# Patient Record
Sex: MALE | Race: WHITE | NOT HISPANIC OR LATINO | Employment: OTHER | URBAN - METROPOLITAN AREA
[De-identification: names, ages, dates, MRNs, and addresses within clinical notes are randomized per-mention and may not be internally consistent; named-entity substitution may affect disease eponyms.]

---

## 2017-06-06 ENCOUNTER — OFFICE VISIT (OUTPATIENT)
Dept: FAMILY MEDICINE | Facility: CLINIC | Age: 82
End: 2017-06-06
Payer: COMMERCIAL

## 2017-06-06 VITALS — HEIGHT: 69 IN | WEIGHT: 206.6 LBS | RESPIRATION RATE: 16 BRPM | BODY MASS INDEX: 30.6 KG/M2

## 2017-06-06 DIAGNOSIS — I71.40 ABDOMINAL AORTIC ANEURYSM (AAA) WITHOUT RUPTURE (H): Primary | ICD-10-CM

## 2017-06-06 DIAGNOSIS — N52.9 IMPOTENCE: ICD-10-CM

## 2017-06-06 DIAGNOSIS — E78.5 HYPERLIPIDEMIA LDL GOAL <100: ICD-10-CM

## 2017-06-06 LAB
ANION GAP SERPL CALCULATED.3IONS-SCNC: 9 MMOL/L (ref 3–14)
BUN SERPL-MCNC: 17 MG/DL (ref 7–30)
CALCIUM SERPL-MCNC: 9.6 MG/DL (ref 8.5–10.1)
CHLORIDE SERPL-SCNC: 108 MMOL/L (ref 94–109)
CHOLEST SERPL-MCNC: 231 MG/DL
CO2 SERPL-SCNC: 23 MMOL/L (ref 20–32)
CREAT SERPL-MCNC: 1.03 MG/DL (ref 0.66–1.25)
GFR SERPL CREATININE-BSD FRML MDRD: 69 ML/MIN/1.7M2
GLUCOSE SERPL-MCNC: 89 MG/DL (ref 70–99)
HDLC SERPL-MCNC: 46 MG/DL
LDLC SERPL CALC-MCNC: 151 MG/DL
NONHDLC SERPL-MCNC: 185 MG/DL
POTASSIUM SERPL-SCNC: 4.4 MMOL/L (ref 3.4–5.3)
SODIUM SERPL-SCNC: 140 MMOL/L (ref 133–144)
TRIGL SERPL-MCNC: 169 MG/DL

## 2017-06-06 PROCEDURE — 36415 COLL VENOUS BLD VENIPUNCTURE: CPT | Performed by: FAMILY MEDICINE

## 2017-06-06 PROCEDURE — 80061 LIPID PANEL: CPT | Performed by: FAMILY MEDICINE

## 2017-06-06 PROCEDURE — 80048 BASIC METABOLIC PNL TOTAL CA: CPT | Performed by: FAMILY MEDICINE

## 2017-06-06 PROCEDURE — 99214 OFFICE O/P EST MOD 30 MIN: CPT | Performed by: FAMILY MEDICINE

## 2017-06-06 RX ORDER — SILDENAFIL 100 MG/1
100 TABLET, FILM COATED ORAL DAILY PRN
Qty: 25 TABLET | Refills: 3 | Status: SHIPPED | OUTPATIENT
Start: 2017-06-06 | End: 2018-06-05

## 2017-06-06 NOTE — PROGRESS NOTES
SUBJECTIVE:                                                            Dragan Prasad is a 81 year old male who presents for Preventive Visit. Dragan is in the day for the follow up of his multiple problems. He is currently doing quite well he scheduled to have his abdominal aorta rescanned. In addition to that he's concerned about the fact that he has plugged ears bilaterally, he has multiple skin lesions he would like us to look at it some skin tags he likens to remove, and he continues to have significant difficulty with erectile dysfunction.      Are you in the first 12 months of your Medicare Part B coverage?  No    Healthy Habits:    Do you get at least three servings of calcium containing foods daily (dairy, green leafy vegetables, etc.)? yes    Amount of exercise or daily activities, outside of work: 4-5 day(s) per week    Problems taking medications regularly No    Medication side effects: No    Have you had an eye exam in the past two years? yes    Do you see a dentist twice per year? no    Do you have sleep apnea, excessive snoring or daytime drowsiness?no    COGNITIVE SCREEN  1) Repeat 3 items (Banana, Sunrise, Chair)    2) Clock draw: NORMAL  3) 3 item recall: Recalls 3 objects  Results: 3 items recalled: COGNITIVE IMPAIRMENT LESS LIKELY    Mini-CogTM Copyright S Janneth. Licensed by the author for use in [facility]; reprinted with permission (hay@[facility]). All rights reserved.                Reviewed and updated as needed this visit by clinical staff  Tobacco  Allergies  Meds  Med Hx  Surg Hx  Fam Hx  Soc Hx        Reviewed and updated as needed this visit by Provider        Social History   Substance Use Topics     Smoking status: Former Smoker     Types: Cigarettes     Quit date: 1/1/1986     Smokeless tobacco: Not on file     Alcohol use No       The patient does not drink >3 drinks per day nor >7 drinks per week.    Today's PHQ-2 Score:   PHQ-2 ( 1999 Pf[employer]) 6/25/2014  5/22/2013   Q1: Little interest or pleasure in doing things 0 0   Q2: Feeling down, depressed or hopeless 0 0   PHQ-2 Score 0 0       Do you feel safe in your environment - Yes    Do you have a Health Care Directive?: No: Advance care planning reviewed with patient; information given to patient to review.    Current providers sharing in care for this patient include:   Patient Care Team:  Jorge Elizabeth MD as PCP - General (Family Practice)      Hearing impairment: No    Ability to successfully perform activities of daily living: Yes, no assistance needed     Fall risk:       Home safety:  none identified      The following health maintenance items are reviewed in Epic and correct as of today:  Health Maintenance   Topic Date Due     ADVANCE DIRECTIVE PLANNING Q5 YRS  12/19/1953     PNEUMOCOCCAL (2 of 2 - PCV13) 03/18/2012     FALL RISK ASSESSMENT  05/22/2014     INFLUENZA VACCINE (SYSTEM ASSIGNED)  09/01/2017     TETANUS IMMUNIZATION (SYSTEM ASSIGNED)  05/20/2018              ROS:  C: NEGATIVE for fever, chills, change in weight  E/M: NEGATIVE for ear, mouth and throat problems  R: NEGATIVE for significant cough or SOB  CV: NEGATIVE for chest pain, palpitations or peripheral edema    Problem list, Medication list, Allergies, and Medical/Social/Surgical histories reviewed in [facility] and updated as appropriate.  Patient Active Problem List   Diagnosis     Elevated blood pressure reading without diagnosis of hypertension     Family history of other cardiovascular diseases     Thoracic or lumbosacral neuritis or radiculitis, unspecified     Abdominal pain, right upper quadrant     Abdominal aortic aneurysm (H)     Benign neoplasm of skin     CAD (coronary artery disease)     CARDIOVASCULAR SCREENING; LDL GOAL LESS THAN 100     Past Surgical History:   Procedure Laterality Date     ARTHROSCOPY OF JOINT UNLISTED      right knee about 1996     C APPENDECTOMY       SURGICAL HISTORY OF -   02/13/02    Basal cell  "carcinoma w/positive margins, right  eyebrow & eyelid       Social History   Substance Use Topics     Smoking status: Former Smoker     Types: Cigarettes     Quit date: 1986     Smokeless tobacco: Not on file     Alcohol use No     Family History   Problem Relation Age of Onset     CANCER Mother      lung cancer     C.A.D. Father      uncertain.  age 79     Unknown/Adopted Maternal Grandmother      Unknown/Adopted Maternal Grandfather      Unknown/Adopted Paternal Grandmother      Unknown/Adopted Paternal Grandfather      DIABETES Brother      Dre     C.A.D. Brother      Dre  age 64         OBJECTIVE:                                                            Resp 16  Ht 5' 9.25\" (1.759 m)  Wt 206 lb 9.6 oz (93.7 kg)  BMI 30.29 kg/m2 Estimated body mass index is 30.29 kg/(m^2) as calculated from the following:    Height as of this encounter: 5' 9.25\" (1.759 m).    Weight as of this encounter: 206 lb 9.6 oz (93.7 kg).  EXAM:   GENERAL: healthy, alert and no distress  NECK: no adenopathy, no asymmetry, masses, or scars and thyroid normal to palpation  RESP: lungs clear to auscultation - no rales, rhonchi or wheezes  CV: regular rate and rhythm, normal S1 S2, no S3 or S4, no murmur, click or rub, no peripheral edema and peripheral pulses strong  ABDOMEN: soft, nontender, no hepatosplenomegaly, no masses and bowel sounds normal  MS: no gross musculoskeletal defects noted, no edema    ASSESSMENT / PLAN:                                                            1. Impotence  We'll go ahead and increase his Viagra dosage  - sildenafil (VIAGRA) 100 MG cap/tab; Take 1 tablet (100 mg) by mouth daily as needed for erectile dysfunction Take 30 min to 4 hours before intercourse.  Never use with nitroglycerin, terazosin or doxazosin.  Dispense: 25 tablet; Refill: 3    2. Abdominal aortic aneurysm (AAA) without rupture (H)  Work on blood pressure control  - US abdominal aorta limited; Future    3. " "Hyperlipidemia LDL goal <100  Recheck his lipids.- Lipid Profile  - Basic metabolic panel    End of Life Planning:  Patient currently has an advanced directive:     COUNSELING:          Estimated body mass index is 30.29 kg/(m^2) as calculated from the following:    Height as of this encounter: 5' 9.25\" (1.759 m).    Weight as of this encounter: 206 lb 9.6 oz (93.7 kg).     reports that he quit smoking about 31 years ago. His smoking use included Cigarettes. He does not have any smokeless tobacco history on file.      Appropriate preventive services were discussed with this patient, including applicable screening as appropriate for cardiovascular disease, diabetes, osteopenia/osteoporosis, and glaucoma.  As appropriate for age/gender, discussed screening for colorectal cancer, prostate cancer, breast cancer, and cervical cancer. Checklist reviewing preventive services available has been given to the patient.    Reviewed patients plan of care and provided an AVS. The  millicent Archibald meets the Care Plan requirement. This Care Plan has been established and reviewed with the     Counseling Resources:  ATP IV Guidelines  Pooled Cohorts Equation Calculator  Breast Cancer Risk Calculator  FRAX Risk Assessment  ICSI Preventive Guidelines  Dietary Guidelines for Americans, 2010  USDA's MyPlate  ASA Prophylaxis  Lung CA Screening    Jorge Elizabeth MD  [facility]  "

## 2017-06-06 NOTE — MR AVS SNAPSHOT
After Visit Summary   6/6/2017    Dragan Prasad    MRN: [id number]           Patient Information     Date Of Birth          1935        Visit Information        Provider Department      6/6/2017 1:00 PM Jorge Elizabeth MD [facility]        Today's Diagnoses     Abdominal aortic aneurysm (AAA) without rupture (H)    -  1    Impotence        Hyperlipidemia LDL goal <100          Care Instructions      Preventive Health Recommendations:       Male Ages 65 and over    Yearly exam:             See your health care provider every year in order to  o   Review health changes.   o   Discuss preventive care.    o   Review your medicines if your doctor has prescribed any.    Talk with your health care provider about whether you should have a test to screen for prostate cancer (PSA).    Every 3 years, have a diabetes test (fasting glucose). If you are at risk for diabetes, you should have this test more often.    Every 5 years, have a cholesterol test. Have this test more often if you are at risk for high cholesterol or heart disease.     Every 10 years, have a colonoscopy. Or, have a yearly FIT test (stool test). These exams will check for colon cancer.    Talk to with your health care provider about screening for Abdominal Aortic Aneurysm if you have a family history of AAA or have a history of smoking.  Shots:     Get a flu shot each year.     Get a tetanus shot every 10 years.     Talk to your doctor about your pneumonia vaccines. There are now two you should receive - Pneumovax (PPSV 23) and Prevnar (PCV 13).    Talk to your doctor about a shingles vaccine.     Talk to your doctor about the hepatitis B vaccine.  Nutrition:     Eat at least 5 servings of fruits and vegetables each day.     Eat whole-grain bread, whole-wheat pasta and brown rice instead of white grains and rice.     Talk to your doctor about Calcium and Vitamin D.   Lifestyle    Exercise for at least 150  "minutes a week (30 minutes a day, 5 days a week). This will help you control your weight and prevent disease.     Limit alcohol to one drink per day.     No smoking.     Wear sunscreen to prevent skin cancer.     See your dentist every six months for an exam and cleaning.     See your eye doctor every 1 to 2 years to screen for conditions such as glaucoma, macular degeneration and cataracts.    1. I will call on labs.    2. Try the 100 mg of Viagra    3. Continue to check your BP.  Call if it gets over 150/90.    4. Take one baby aspirin daily.           Follow-ups after your visit        Future tests that were ordered for you today     Open Future Orders        Priority Expected Expires Ordered    US abdominal aorta limited Routine 6/7/2017 6/6/2018 6/6/2017            Who to contact     If you have questions or need follow up information about today's clinic visit or your schedule please contact [facility] directly at [phone].  Normal or non-critical lab and imaging results will be communicated to you by [email or web address], letter or phone within 4 business days after the clinic has received the results. If you do not hear from us within 7 days, please contact the clinic through [email or web address] or phone. If you have a critical or abnormal lab result, we will notify you by phone as soon as possible.  Submit refill requests through Kabbage or call your pharmacy and they will forward the refill request to us. Please allow 3 business days for your refill to be completed.          Additional Information About Your Visit        Kabbage Information     Kabbage lets you send messages to your doctor, view your test results, renew your prescriptions, schedule appointments and more. To sign up, go to [email or web address] . Click on \"Log in\" on the left side of the screen, which will take you to the Welcome page. Then click on \"Sign up Now\" on the right side of the page.     You will be asked to enter the access code " "listed below, as well as some personal information. Please follow the directions to create your username and password.     Your access code is: [email or web address]  Expires: 2017  1:28 PM     Your access code will  in 90 days. If you need help or a new code, please call your [place] clinic or [phone].        Care EveryWhere ID     This is your Care EveryWhere ID. This could be used by other organizations to access your [place] medical records  [id number]        Your Vitals Were     Respirations Height BMI (Body Mass Index)             16 5' 9.25\" (1.759 m) 30.29 kg/m2          Blood Pressure from Last 3 Encounters:   16 155/88   16 149/84   16 140/80    Weight from Last 3 Encounters:   17 206 lb 9.6 oz (93.7 kg)   16 201 lb (91.2 kg)   16 201 lb (91.2 kg)              We Performed the Following     Basic metabolic panel     Lipid Profile          Today's Medication Changes          These changes are accurate as of: 17  1:28 PM.  If you have any questions, ask your nurse or doctor.               These medicines have changed or have updated prescriptions.        Dose/Directions    * sildenafil 50 MG cap/tab   Commonly known as:  VIAGRA   This may have changed:  Another medication with the same name was added. Make sure you understand how and when to take each.   Used for:  Impotence   Changed by:  Jorge Elizabeth MD        Dose:  50 mg   Take 1 tablet (50 mg) by mouth daily as needed for erectile dysfunction at least 30 minutes before intercourse.   Quantity:  12 tablet   Refills:  3       * sildenafil 100 MG cap/tab   Commonly known as:  VIAGRA   This may have changed:  You were already taking a medication with the same name, and this prescription was added. Make sure you understand how and when to take each.   Used for:  Impotence   Changed by:  Jorge Elizabeth MD        Dose:  100 mg   Take 1 tablet (100 mg) by mouth daily as needed for " erectile dysfunction Take 30 min to 4 hours before intercourse.  Never use with nitroglycerin, terazosin or doxazosin.   Quantity:  25 tablet   Refills:  3       * Notice:  This list has 2 medication(s) that are the same as other medications prescribed for you. Read the directions carefully, and ask your doctor or other care provider to review them with you.      Stop taking these medicines if you haven't already. Please contact your care team if you have questions.     gentamicin 0.3 % ophthalmic ointment   Commonly known as:  GARAMYCIN   Stopped by:  Jorge Elizabeth MD           methylPREDNISolone 4 MG tablet   Commonly known as:  MEDROL DOSEPAK   Stopped by:  Jorge Elizabeth MD                Where to get your medicines      Some of these will need a paper prescription and others can be bought over the counter.  Ask your nurse if you have questions.     Bring a paper prescription for each of these medications     sildenafil 100 MG cap/tab                Primary Care Provider Office Phone # Fax #    Jorge Elizabeth [phone]       [place] [phone]        Thank you!     Thank you for choosing [facility]  for your care. Our goal is always to provide you with excellent care. Hearing back from our patients is one way we can continue to improve our services. Please take a few minutes to complete the written survey that you may receive in the mail after your visit with us. Thank you!             Your Updated Medication List - Protect others around you: Learn how to safely use, store and throw away your medicines at www.disposemymeds.org.          This list is accurate as of: 6/6/17  1:28 PM.  Always use your most recent med list.                   Brand Name Dispense Instructions for use    aspirin 325 MG EC tablet      ONE DAILY       Multiple Vitamins-Minerals Pack      ONE PACKET A DAY        * sildenafil 50 MG cap/tab    VIAGRA    12 tablet    Take 1 tablet (50 mg) by mouth daily as needed for erectile dysfunction at least 30 minutes before intercourse.       * sildenafil 100 MG cap/tab    VIAGRA    25 tablet    Take 1 tablet (100 mg) by mouth daily as needed for erectile dysfunction Take 30 min to 4 hours before intercourse.  Never use with nitroglycerin, terazosin or doxazosin.       * Notice:  This list has 2 medication(s) that are the same as other medications prescribed for you. Read the directions carefully, and ask your doctor or other care provider to review them with you.

## 2017-06-06 NOTE — PATIENT INSTRUCTIONS
Preventive Health Recommendations:       Male Ages 65 and over    Yearly exam:             See your health care provider every year in order to  o   Review health changes.   o   Discuss preventive care.    o   Review your medicines if your doctor has prescribed any.    Talk with your health care provider about whether you should have a test to screen for prostate cancer (PSA).    Every 3 years, have a diabetes test (fasting glucose). If you are at risk for diabetes, you should have this test more often.    Every 5 years, have a cholesterol test. Have this test more often if you are at risk for high cholesterol or heart disease.     Every 10 years, have a colonoscopy. Or, have a yearly FIT test (stool test). These exams will check for colon cancer.    Talk to with your health care provider about screening for Abdominal Aortic Aneurysm if you have a family history of AAA or have a history of smoking.  Shots:     Get a flu shot each year.     Get a tetanus shot every 10 years.     Talk to your doctor about your pneumonia vaccines. There are now two you should receive - Pneumovax (PPSV 23) and Prevnar (PCV 13).    Talk to your doctor about a shingles vaccine.     Talk to your doctor about the hepatitis B vaccine.  Nutrition:     Eat at least 5 servings of fruits and vegetables each day.     Eat whole-grain bread, whole-wheat pasta and brown rice instead of white grains and rice.     Talk to your doctor about Calcium and Vitamin D.   Lifestyle    Exercise for at least 150 minutes a week (30 minutes a day, 5 days a week). This will help you control your weight and prevent disease.     Limit alcohol to one drink per day.     No smoking.     Wear sunscreen to prevent skin cancer.     See your dentist every six months for an exam and cleaning.     See your eye doctor every 1 to 2 years to screen for conditions such as glaucoma, macular degeneration and cataracts.    1. I will call on labs.    2. Try the 100 mg of  Viagra    3. Continue to check your BP.  Call if it gets over 150/90.    4. Take one baby aspirin daily.

## 2017-06-06 NOTE — NURSING NOTE
"Chief Complaint   Patient presents with     Physical     would like referral for heart doctor. Would like skin check.       Initial Resp 16  Ht 5' 9.25\" (1.759 m)  Wt 206 lb 9.6 oz (93.7 kg)  BMI 30.29 kg/m2 Estimated body mass index is 30.29 kg/(m^2) as calculated from the following:    Height as of this encounter: 5' 9.25\" (1.759 m).    Weight as of this encounter: 206 lb 9.6 oz (93.7 kg).  Medication Reconciliation: complete    "

## 2017-06-09 ENCOUNTER — HOSPITAL ENCOUNTER (OUTPATIENT)
Dept: ULTRASOUND IMAGING | Facility: CLINIC | Age: 82
Discharge: HOME | End: 2017-06-09
Attending: FAMILY MEDICINE | Admitting: FAMILY MEDICINE
Payer: MEDICARE

## 2017-06-09 DIAGNOSIS — I71.40 ABDOMINAL AORTIC ANEURYSM (AAA) WITHOUT RUPTURE (H): ICD-10-CM

## 2017-06-09 PROCEDURE — 76775 US EXAM ABDO BACK WALL LIM: CPT

## 2018-03-09 ENCOUNTER — TELEPHONE (OUTPATIENT)
Dept: FAMILY MEDICINE | Facility: CLINIC | Age: 83
End: 2018-03-09

## 2018-03-09 NOTE — TELEPHONE ENCOUNTER
Application for disbility parking certificate to Dr Elizabeth for completion.  Call [phone] (Evre, son in law) when ready for )

## 2018-03-15 NOTE — TELEPHONE ENCOUNTER
Phone number for son in law did not go through. I called Dragan who is in [place] until May. He asked that we mail it to his house and he will get it when he gets back.  DONE>     Copy to scanning.

## 2018-05-21 ENCOUNTER — OFFICE VISIT (OUTPATIENT)
Dept: DERMATOLOGY | Facility: CLINIC | Age: 83
End: 2018-05-21
Payer: COMMERCIAL

## 2018-05-21 ENCOUNTER — TELEPHONE (OUTPATIENT)
Dept: DERMATOLOGY | Facility: CLINIC | Age: 83
End: 2018-05-21

## 2018-05-21 ENCOUNTER — TELEPHONE (OUTPATIENT)
Dept: FAMILY MEDICINE | Facility: CLINIC | Age: 83
End: 2018-05-21

## 2018-05-21 VITALS — HEART RATE: 53 BPM | DIASTOLIC BLOOD PRESSURE: 89 MMHG | SYSTOLIC BLOOD PRESSURE: 160 MMHG | OXYGEN SATURATION: 96 %

## 2018-05-21 DIAGNOSIS — L81.4 LENTIGO: ICD-10-CM

## 2018-05-21 DIAGNOSIS — C44.311 BASAL CELL CARCINOMA OF NOSE: ICD-10-CM

## 2018-05-21 DIAGNOSIS — C44.311 BASAL CELL CARCINOMA OF RIGHT ALA NASI: ICD-10-CM

## 2018-05-21 DIAGNOSIS — C44.519 BASAL CELL CARCINOMA OF BACK: ICD-10-CM

## 2018-05-21 DIAGNOSIS — C44.319 BASAL CELL CARCINOMA, FOREHEAD: Primary | ICD-10-CM

## 2018-05-21 DIAGNOSIS — L57.0 AK (ACTINIC KERATOSIS): ICD-10-CM

## 2018-05-21 DIAGNOSIS — D48.5 NEOPLASM OF UNCERTAIN BEHAVIOR OF SKIN: Primary | ICD-10-CM

## 2018-05-21 DIAGNOSIS — I71.40 ABDOMINAL AORTIC ANEURYSM (AAA) WITHOUT RUPTURE (H): Primary | ICD-10-CM

## 2018-05-21 DIAGNOSIS — L82.0 INFLAMED SEBORRHEIC KERATOSIS: ICD-10-CM

## 2018-05-21 DIAGNOSIS — Z85.828 HISTORY OF SKIN CANCER: ICD-10-CM

## 2018-05-21 DIAGNOSIS — D22.9 NEVUS: ICD-10-CM

## 2018-05-21 PROCEDURE — 11101 ZZHC BIOPSY SKIN/SUBQ/MUC MEM, EACH ADDTL LESION: CPT | Performed by: PHYSICIAN ASSISTANT

## 2018-05-21 PROCEDURE — 88331 PATH CONSLTJ SURG 1 BLK 1SPC: CPT | Performed by: DERMATOLOGY

## 2018-05-21 PROCEDURE — 11100 HC BIOPSY SKIN/SUBQ/MUC MEM, SINGLE LESION: CPT | Mod: 59 | Performed by: PHYSICIAN ASSISTANT

## 2018-05-21 PROCEDURE — 17110 DESTRUCTION B9 LES UP TO 14: CPT | Mod: 51 | Performed by: PHYSICIAN ASSISTANT

## 2018-05-21 PROCEDURE — 99214 OFFICE O/P EST MOD 30 MIN: CPT | Mod: 25 | Performed by: PHYSICIAN ASSISTANT

## 2018-05-21 NOTE — PROGRESS NOTES
L hairline:Orthokeratosis of epidermis with a proliferation of nests of basaloid cells, with peripheral palisading and a haphazard arrangement in the center extending into the dermis, .  The tumor cells have hyperchromatic nuclei. Poor cytoplasm and intercellular bridging.    L mid forehead:Orthokeratosis of epidermis with a proliferation of nests of basaloid cells, with peripheral palisading and a haphazard arrangement in the center extending into the dermis, .  The tumor cells have hyperchromatic nuclei. Poor cytoplasm and intercellular bridging.    Mid nasal tip:Orthokeratosis of epidermis with a proliferation of nests of basaloid cells, with peripheral palisading and a haphazard arrangement in the center extending into the dermis, .  The tumor cells have hyperchromatic nuclei. Poor cytoplasm and intercellular bridging.    R ala:Orthokeratosis of epidermis with a proliferation of nests of basaloid cells, with peripheral palisading and a haphazard arrangement in the center extending into the dermis, .  The tumor cells have hyperchromatic nuclei. Poor cytoplasm and intercellular bridging.    R lower cheek:There is hyperkeratosis and focal parakeratosis of the epidermis, overlying atypical keratinocytes,  by areas of orthokeratosis, there are scattered basal atypical keratinocytes: with varying degrees of overlying loss of maturation, hyperchromatism, pleomorphism, increased and abnormal mitoses, dyskeratosis.  The dermis shows a variable inflammatory infiltrate.   Mid upper back:Orthokeratosis of epidermis with a proliferation of nests of basaloid cells, with peripheral palisading and a haphazard arrangement in the center extending into the dermis, .  The tumor cells have hyperchromatic nuclei. Poor cytoplasm and intercellular bridging.    Mid lower back :Orthokeratosis of epidermis with a proliferation of nests of basaloid cells, with peripheral palisading and a haphazard arrangement in the center  extending into the dermis, .  The tumor cells have hyperchromatic nuclei. Poor cytoplasm and intercellular bridging.        L hairline basal cell carcinoma excision   L mid forehead basal cell carcinoma excision   Mid nasal tip basal cell carcinoma excision   R ala basal cell carcinoma excision   R lower cheek actinic keratosis cryo   Mid upper back basal cell carcinoma excision   Mid lower back basal cell carcinoma excision

## 2018-05-21 NOTE — PROGRESS NOTES
HPI:   Dragan Prasad is a 82 year old male who presents for evaluation of few spots on the face  chief complaint  Location: face - also has some spots on the back   Condition present for:  awhile.   Previous treatments include: none  -H/o NMSC    Review Of Systems  Eyes: negative  Ears/Nose/Throat: negative  Respiratory: No shortness of breath, dyspnea on exertion, cough, or hemoptysis  Cardiovascular: negative  Gastrointestinal: negative  Genitourinary: negative  Musculoskeletal: negative  Neurologic: negative  Psychiatric: negative        PHYSICAL EXAM:    /89  Pulse 53  SpO2 96%  Skin exam performed as follows: Type 2 skin. Mood appropriate  Alert and Oriented X 3. Well developed, well nourished in no distress.  General appearance: Normal  Head including face: Normal  Eyes: conjunctiva and lids: Normal  Mouth: Lips, teeth, gums: Normal  Neck: Normal  Chest-breast/axillae: Normal  Back: Normal  Spleen and liver: Normal  Cardiovascular: Exam of peripheral vascular system by observation for swelling, varicosities, edema: Normal  Genitalia: groin, buttocks: Normal  Extremities: digits/nails (clubbing): Normal  Eccrine and Apocrine glands: Normal  Right upper extremity: Normal  Left upper extremity: Normal  Right lower extremity: Normal  Left lower extremity: Normal  Skin: Scalp and body hair: See below    1. 8 mm pink hyperkeratotic papule on the left hairline  2. 6 mm pink papule on the mid nasal tip  3. 7 mm pink papule on the left mid forehead  4. 5 mm pink papule on the right ala  5. 10 mm pink excoriated plaque on the right lower cheek  6. 6 mm pink papule on the mid upper back  7. 7 mm brown/pink papule on the mid lower back    ASSESSMENT/PLAN:     1. R/o BCC on the left hairline, mid nasal tip, left mid forehead, right ala, right lower cheek, mid upper back, mid lower back. Shave bx in typical fashion .  Area cleaned with betadyne and anesthetized with 1% lidocaine with epi .  Dermablade used to  remove the lesion and sent to pathology. Bleeding was cauterized. Pt tolerated procedure well.  2. Inflamed seborrheic keratosis on left orbital rim x 1. As physically tender cryosurgery performed. Advised on post op care.   3. Multiple lentigos on arms and trunk. Advised benign, no treatment needed.  4. Multiple benign appearing nevi on arms and trunk. Discussed ABCDEs of melanoma and sunscreen.   5. Patient to follow up with Primary Care provider regarding elevated blood pressure.            Follow-up: pending path  CC:   Scribed By: Evelin Koch MS, PA-C

## 2018-05-21 NOTE — LETTER
[facility]  [address] [address]  Phone: [phone]    May 22, 2018    Dragan DOYLE Prasad                                                                                                                [address] [facility] [address]            Dear Mr. Prasad,    You are scheduled for Mohs Surgery on:    Wednesday June 13 th at 7:45 am.  Monday June 18 th at 7:45 am.  Wednesday June 20 th at 7:45 am.     Please check in at Dermatology Clinic.   (2nd Floor, last  Clinic on right up staircase or elevator -past OB/GYN clinic)    You don't need to arrive more than 5-10 minutes prior to your appointment time.     Be sure to eat a good breakfast and bathe and wash your hair prior to Surgery.    If you are taking any anti-coagulants that are prescribed by your Doctor (such as Coumadin/warfarin, Plavix, Aspirin, Ibuprofen), please continue taking them.     However, If you are taking anti-coagulants over the counter without  a Doctor's order for a Medical condition, please discontinue them 10 days prior to Surgery.      Please wear loose comfortable clothing as it could possibly be 4-6 hours until your surgery is completed depending upon how many layers of tissue need to be removed.     Wi-fi access is available.     Thank you,      Javier Wadsworth MD/ Andreea Lima RN

## 2018-05-21 NOTE — TELEPHONE ENCOUNTER
----- Message from Javier Wadsworth MD sent at 5/21/2018 12:50 PM CDT -----    L hairline basal cell carcinoma excision   L mid forehead basal cell carcinoma excision   Mid nasal tip basal cell carcinoma excision   R ala basal cell carcinoma excision   R lower cheek actinic keratosis cryo   Mid upper back basal cell carcinoma excision   Mid lower back basal cell carcinoma excision

## 2018-05-21 NOTE — LETTER
5/21/2018         RE: Dragan Prasad  [address] [facility] [address]        Dear Colleague,    Thank you for referring your patient, Dragan Prasad, to the [facility]. Please see a copy of my visit note below.    HPI:   Dragan Prasad is a 82 year old male who presents for evaluation of few spots on the face  chief complaint  Location: face - also has some spots on the back   Condition present for:  awhile.   Previous treatments include: none  -H/o NMSC    Review Of Systems  Eyes: negative  Ears/Nose/Throat: negative  Respiratory: No shortness of breath, dyspnea on exertion, cough, or hemoptysis  Cardiovascular: negative  Gastrointestinal: negative  Genitourinary: negative  Musculoskeletal: negative  Neurologic: negative  Psychiatric: negative        PHYSICAL EXAM:    /89  Pulse 53  SpO2 96%  Skin exam performed as follows: Type 2 skin. Mood appropriate  Alert and Oriented X 3. Well developed, well nourished in no distress.  General appearance: Normal  Head including face: Normal  Eyes: conjunctiva and lids: Normal  Mouth: Lips, teeth, gums: Normal  Neck: Normal  Chest-breast/axillae: Normal  Back: Normal  Spleen and liver: Normal  Cardiovascular: Exam of peripheral vascular system by observation for swelling, varicosities, edema: Normal  Genitalia: groin, buttocks: Normal  Extremities: digits/nails (clubbing): Normal  Eccrine and Apocrine glands: Normal  Right upper extremity: Normal  Left upper extremity: Normal  Right lower extremity: Normal  Left lower extremity: Normal  Skin: Scalp and body hair: See below    1. 8 mm pink hyperkeratotic papule on the left hairline  2. 6 mm pink papule on the mid nasal tip  3. 7 mm pink papule on the left mid forehead  4. 5 mm pink papule on the right ala  5. 10 mm pink excoriated plaque on the right lower cheek  6. 6 mm pink papule on the mid upper back  7. 7 mm brown/pink papule on the mid lower back    ASSESSMENT/PLAN:     1. R/o BCC  on the left hairline, mid nasal tip, left mid forehead, right ala, right lower cheek, mid upper back, mid lower back. Shave bx in typical fashion .  Area cleaned with betadyne and anesthetized with 1% lidocaine with epi .  Dermablade used to remove the lesion and sent to pathology. Bleeding was cauterized. Pt tolerated procedure well.  2. Inflamed seborrheic keratosis on left orbital rim x 1. As physically tender cryosurgery performed. Advised on post op care.   3. Multiple lentigos on arms and trunk. Advised benign, no treatment needed.  4. Multiple benign appearing nevi on arms and trunk. Discussed ABCDEs of melanoma and sunscreen.   5. Patient to follow up with Primary Care provider regarding elevated blood pressure.            Follow-up: pending path  CC:   Scribed By: Evelin Koch, MS, LINDA      Again, thank you for allowing me to participate in the care of your patient.        Sincerely,        Evelin Koch PA-C

## 2018-05-21 NOTE — MR AVS SNAPSHOT
After Visit Summary   5/21/2018    Dragan rPasad    MRN: [id number]           Patient Information     Date Of Birth          1935        Visit Information        Provider Department      5/21/2018 11:00 AM Evelin Koch PA-C [facility]        Today's Diagnoses     Neoplasm of uncertain behavior of skin    -  1    Inflamed seborrheic keratosis        Lentigo        Nevus        History of skin cancer          Care Instructions          Wound Care Instructions     FOR SUPERFICIAL WOUNDS     [facility] [phone]    [facility] [phone]                       AFTER 24 HOURS YOU SHOULD REMOVE THE BANDAGE AND BEGIN DAILY DRESSING CHANGES AS FOLLOWS:     1) Remove Dressing.     2) Clean and dry the area with tap water using a Q-tip or sterile gauze pad.     3) Apply Vaseline, Aquaphor, Polysporin ointment or Bacitracin ointment over entire wound.  Do NOT use Neosporin ointment.     4) Cover the wound with a band-aid, or a sterile non-stick gauze pad and micropore paper tape      REPEAT THESE INSTRUCTIONS AT LEAST ONCE A DAY UNTIL THE WOUND HAS COMPLETELY HEALED.    It is an old wives tale that a wound heals better when it is exposed to air and allowed to dry out. The wound will heal faster with a better cosmetic result if it is kept moist with ointment and covered with a bandage.    **Do not let the wound dry out.**      Supplies Needed:      *Cotton tipped applicators (Q-tips)    *Polysporin Ointment or Bacitracin Ointment (NOT NEOSPORIN)    *Band-aids or non-stick gauze pads and micropore paper tape.      PATIENT INFORMATION:    During the healing process you will notice a number of changes. All wounds develop a small halo of redness surrounding the wound.  This means healing is occurring. Severe itching with extensive redness usually indicates sensitivity to the ointment or bandage tape used to dress the wound.  You should call our office if this  develops.      Swelling  and/or discoloration around your surgical site is common, particularly when performed around the eye.    All wounds normally drain.  The larger the wound the more drainage there will be.  After 7-10 days, you will notice the wound beginning to shrink and new skin will begin to grow.  The wound is healed when you can see skin has formed over the entire area.  A healed wound has a healthy, shiny look to the surface and is red to dark pink in color to normalize.  Wounds may take approximately 4-6 weeks to heal.  Larger wounds may take 6-8 weeks.  After the wound is healed you may discontinue dressing changes.    You may experience a sensation of tightness as your wound heals. This is normal and will gradually subside.    Your healed wound may be sensitive to temperature changes. This sensitivity improves with time, but if you re having a lot of discomfort, try to avoid temperature extremes.    Patients frequently experience itching after their wound appears to have healed because of the continue healing under the skin.  Plain Vaseline will help relieve the itching.        POSSIBLE COMPLICATIONS    BLEEDIN. Leave the bandage in place.  2. Use tightly rolled up gauze or a cloth to apply direct pressure over the bandage for 30  minutes.  3. Reapply pressure for an additional 30 minutes if necessary  4. Use additional gauze and tape to maintain pressure once the bleeding has stopped.      WOUND CARE INSTRUCTIONS   FOR CRYOSURGERY   This area treated with liquid nitrogen will form a blister. You do not need to bandage the area until after the blister forms and breaks (which may be a few days). When the blister breaks, begin daily dressing changes as follows:   1) Clean and dry the area with tap water using clean Q-tip or sterile gauze pad.   2) Apply Polysporin ointment or Bacitracin ointment over entire wound. Do NOT use Neosporin ointment.   3) Cover the wound with a band-aid or sterile  non-stick gauze pad and micropore paper tape.   REPEAT THESE INSTRUCTIONS AT LEAST ONCE A DAY UNTIL THE WOUND HAS COMPLETELY HEALED.   It is an old wives tale that a wound heals better when it is exposed to air and allowed to dry out. The wound will heal faster with a better cosmetic result if it is kept moist with ointment and covered with a bandage.   Do not let the wound dry out.   IMPORTANT INFORMATION ON REVERSE SIDE   Supplies Needed:   *Cotton tipped applicators (Q-tips)   *Polysporin ointment or Bacitracin ointment (NOT NEOSPORIN)   *Band-aids, or non stick gauze pads and micropore paper tape   PATIENT INFORMATION   During the healing process you will notice a number of changes. All wounds develop a small halo of redness surrounding the wound. This means healing is occurring. Severe itching with extensive redness usually indicates sensitivity to the ointment or bandage tape used to dress the wound. You should call our office if this develops.   Swelling and/or discoloration around your surgical site is common, particularly when performed around the eye.   All wounds normally drain. The larger the wound the more drainage there will be. After 7-10 days, you will notice the wound beginning to shrink and new skin will begin to grow. The wound is healed when you can see skin has formed over the entire area. A healed wound has a healthy, shiny look to the surface and is red to dark pink in color to normalize. Wounds may take approximately 4-6 weeks to heal. Larger wounds may take 6-8 weeks. After the wound is healed you may discontinue dressing changes.   You may experience a sensation of tightness as your wound heals. This is normal and will gradually subside.   Your healed wound may be sensitive to temperature changes. This sensitivity improves with time, but if you re having a lot of discomfort, try to avoid temperature extremes.   Patients frequently experience itching after their wound appears to have healed  because of the continue healing under the skin. Plain Vaseline will help relieve the itching.               Follow-ups after your visit        Your next 10 appointments already scheduled     May 23, 2018  9:30 AM CDT   US ABDOMINAL AORTIC IMAGING with WYUS2   [facility] ([facility])    [address] [facility] [address]   [phone]           Please bring a list of your medicines (including vitamins, minerals and over-the-counter drugs). Also, tell your doctor about any allergies you may have. Wear comfortable clothes and leave your valuables at home.  Adults: No eating or drinking for 8 hours before the exam. You may take medicine with a small sip of water.  Children: - Children 6+ years: No food or drink for 6 hours before exam. - Children 1-5 years: No food or drink for 4 hours before exam. - Infants, breast-fed: may have breast milk up to 2 hours before exam. - Infants, formula: may have bottle until 4 hours before exam.  Please call the Imaging Department at your exam site with any questions.              Future tests that were ordered for you today     Open Future Orders        Priority Expected Expires Ordered    US abdominal aorta limited Routine  5/21/2019 5/21/2018            Who to contact     If you have questions or need follow up information about today's clinic visit or your schedule please contact [facility] directly at [phone].  Normal or non-critical lab and imaging results will be communicated to you by MyChart, letter or phone within 4 business days after the clinic has received the results. If you do not hear from us within 7 days, please contact the clinic through MyChart or phone. If you have a critical or abnormal lab result, we will notify you by phone as soon as possible.  Submit refill requests through [employer] or call your pharmacy and they will forward the refill request to us. Please allow 3 business days for your refill to  "be completed.          Additional Information About Your Visit        [facility] Information     [employer] lets you send messages to your doctor, view your test results, renew your prescriptions, schedule appointments and more. To sign up, go to [email or web address] . Click on \"Log in\" on the left side of the screen, which will take you to the Welcome page. Then click on \"Sign up Now\" on the right side of the page.     You will be asked to enter the access code listed below, as well as some personal information. Please follow the directions to create your username and password.     Your access code is: [email or web address]  Expires: 2018 11:20 AM     Your access code will  in 90 days. If you need help or a new code, please call your [place] clinic or [phone].        Care EveryWhere ID     This is your Care EveryWhere ID. This could be used by other organizations to access your [place] medical records  [id number]        Your Vitals Were     Pulse Pulse Oximetry                53 96%           Blood Pressure from Last 3 Encounters:   18 160/89   16 155/88   16 149/84    Weight from Last 3 Encounters:   17 93.7 kg (206 lb 9.6 oz)   16 91.2 kg (201 lb)   16 91.2 kg (201 lb)              We Performed the Following     BIOPSY SKIN/SUBQ/MUC MEM, EACH ADDTL LESION     BIOPSY SKIN/SUBQ/MUC MEM, SINGLE LESION     DESTRUCT BENIGN LESION, UP TO 14        Primary Care Provider Office Phone # Fax #    Jorge Elizabeth [phone]       [address] [id number]        Equal Access to Services     [facility] MICHAEL : Hadii roselyn Clark, wadeoda luediladaha, qaybta kaalmada pancho, brandan richard. So [facility] [phone].    ATENCIÓN: Si habla español, tiene a oscar disposición servicios gratuitos de asistencia lingüística. Llame al [phone].    We comply with applicable federal civil rights laws and [place] " laws. We do not discriminate on the basis of race, color, national origin, age, disability, sex, sexual orientation, or gender identity.            Thank you!     Thank you for choosing [facility]  for your care. Our goal is always to provide you with excellent care. Hearing back from our patients is one way we can continue to improve our services. Please take a few minutes to complete the written survey that you may receive in the mail after your visit with us. Thank you!             Your Updated Medication List - Protect others around you: Learn how to safely use, store and throw away your medicines at www.disposemymeds.org.          This list is accurate as of 5/21/18 11:37 AM.  Always use your most recent med list.                   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  Never use with nitroglycerin, terazosin or doxazosin.    Impotence

## 2018-05-21 NOTE — NURSING NOTE
Chief Complaint   Patient presents with     Skin Check       Vitals:    05/21/18 1048   BP: 160/89   Pulse: 53   SpO2: 96%     Wt Readings from Last 1 Encounters:   06/06/17 93.7 kg (206 lb 9.6 oz)       Melvi Katz LPN.................5/21/2018

## 2018-05-21 NOTE — MR AVS SNAPSHOT
After Visit Summary   5/21/2018    Dragan Prasad    MRN: [id number]           Patient Information     Date Of Birth          1935        Visit Information        Provider Department      5/21/2018 11:15 AM Javier Wadsworth MD [facility]        Today's Diagnoses     Basal cell carcinoma, forehead    -  1    Basal cell carcinoma of nose        Basal cell carcinoma of right ala nasi        AK (actinic keratosis)        Basal cell carcinoma of back           Follow-ups after your visit        Your next 10 appointments already scheduled     May 23, 2018  9:30 AM CDT   US ABDOMINAL AORTIC IMAGING with WYUS2   [facility] ([facility])    [address] [facility] [address] [phone]           Please bring a list of your medicines (including vitamins, minerals and over-the-counter drugs). Also, tell your doctor about any allergies you may have. Wear comfortable clothes and leave your valuables at home.  Adults: No eating or drinking for 8 hours before the exam. You may take medicine with a small sip of water.  Children: - Children 6+ years: No food or drink for 6 hours before exam. - Children 1-5 years: No food or drink for 4 hours before exam. - Infants, breast-fed: may have breast milk up to 2 hours before exam. - Infants, formula: may have bottle until 4 hours before exam.  Please call the Imaging Department at your exam site with any questions.              Future tests that were ordered for you today     Open Future Orders        Priority Expected Expires Ordered    US abdominal aorta limited Routine  5/21/2019 5/21/2018            Who to contact     If you have questions or need follow up information about today's clinic visit or your schedule please contact [facility] directly at [phone].  Normal or non-critical lab and imaging results will be communicated to you by MyChart, letter or phone within 4 business  "days after the clinic has received the results. If you do not hear from us within 7 days, please contact the clinic through [employer] or phone. If you have a critical or abnormal lab result, we will notify you by phone as soon as possible.  Submit refill requests through [employer]PT or call your pharmacy and they will forward the refill request to us. Please allow 3 business days for your refill to be completed.          Additional Information About Your Visit        revoPT Information     revoPT lets you send messages to your doctor, view your test results, renew your prescriptions, schedule appointments and more. To sign up, go to [email or web address] . Click on \"Log in\" on the left side of the screen, which will take you to the Welcome page. Then click on \"Sign up Now\" on the right side of the page.     You will be asked to enter the access code listed below, as well as some personal information. Please follow the directions to create your username and password.     Your access code is: [email or web address]  Expires: 2018 11:20 AM     Your access code will  in 90 days. If you need help or a new code, please call your [place] clinic or [phone].        Care EveryWhere ID     This is your Care EveryWhere ID. This could be used by other organizations to access your [place] medical records  [id number]         Blood Pressure from Last 3 Encounters:   18 160/89   16 155/88   16 149/84    Weight from Last 3 Encounters:   17 93.7 kg (206 lb 9.6 oz)   16 91.2 kg (201 lb)   16 91.2 kg (201 lb)              We Performed the Following     CL FROZEN SECTION FIRST SPEC        Primary Care Provider Office Phone # Fax #    Jorge Elizabeth [phone]       [address] [place] [address]        Equal Access to Services     [facility]IG[facility] AH: Myriam walkero Sogray, waaxda luqadaha, qaybta vijayalmariann deleon, brandan vega " lajanuary richard. So [facility] [phone].    ATENCIÓN: Si habla denisse, tiene a oscar disposición servicios gratuitos de asistencia lingüística. Rahul al [phone].    We comply with applicable federal civil rights laws and [place] laws. We do not discriminate on the basis of race, color, national origin, age, disability, sex, sexual orientation, or gender identity.            Thank you!     Thank you for choosing [facility]  for your care. Our goal is always to provide you with excellent care. Hearing back from our patients is one way we can continue to improve our services. Please take a few minutes to complete the written survey that you may receive in the mail after your visit with us. Thank you!             Your Updated Medication List - Protect others around you: Learn how to safely use, store and throw away your medicines at www.disposemymeds.org.          This list is accurate as of 5/21/18 12:51 PM.  Always use your most recent med list.                   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  Never use with nitroglycerin, terazosin or doxazosin.    Impotence

## 2018-05-21 NOTE — LETTER
5/21/2018         RE: Dragan Prasad  [id number] [facility] [address]        Dear Colleague,    Thank you for referring your patient, Dragan Prasad, to the [facility]. Please see a copy of my visit note below.    L hairline:Orthokeratosis of epidermis with a proliferation of nests of basaloid cells, with peripheral palisading and a haphazard arrangement in the center extending into the dermis, .  The tumor cells have hyperchromatic nuclei. Poor cytoplasm and intercellular bridging.    L mid forehead:Orthokeratosis of epidermis with a proliferation of nests of basaloid cells, with peripheral palisading and a haphazard arrangement in the center extending into the dermis, .  The tumor cells have hyperchromatic nuclei. Poor cytoplasm and intercellular bridging.    Mid nasal tip:Orthokeratosis of epidermis with a proliferation of nests of basaloid cells, with peripheral palisading and a haphazard arrangement in the center extending into the dermis, .  The tumor cells have hyperchromatic nuclei. Poor cytoplasm and intercellular bridging.    R ala:Orthokeratosis of epidermis with a proliferation of nests of basaloid cells, with peripheral palisading and a haphazard arrangement in the center extending into the dermis, .  The tumor cells have hyperchromatic nuclei. Poor cytoplasm and intercellular bridging.    R lower cheek:There is hyperkeratosis and focal parakeratosis of the epidermis, overlying atypical keratinocytes,  by areas of orthokeratosis, there are scattered basal atypical keratinocytes: with varying degrees of overlying loss of maturation, hyperchromatism, pleomorphism, increased and abnormal mitoses, dyskeratosis.  The dermis shows a variable inflammatory infiltrate.   Mid upper back:Orthokeratosis of epidermis with a proliferation of nests of basaloid cells, with peripheral palisading and a haphazard arrangement in the center extending into the dermis, .  The tumor  cells have hyperchromatic nuclei. Poor cytoplasm and intercellular bridging.    Mid lower back :Orthokeratosis of epidermis with a proliferation of nests of basaloid cells, with peripheral palisading and a haphazard arrangement in the center extending into the dermis, .  The tumor cells have hyperchromatic nuclei. Poor cytoplasm and intercellular bridging.        L hairline basal cell carcinoma excision   L mid forehead basal cell carcinoma excision   Mid nasal tip basal cell carcinoma excision   R ala basal cell carcinoma excision   R lower cheek actinic keratosis cryo   Mid upper back basal cell carcinoma excision   Mid lower back basal cell carcinoma excision         Again, thank you for allowing me to participate in the care of your patient.        Sincerely,        Javier Wadsworth MD

## 2018-05-21 NOTE — PATIENT INSTRUCTIONS
Wound Care Instructions     FOR SUPERFICIAL WOUNDS     [facility] [phone]    [facility] [phone]                       AFTER 24 HOURS YOU SHOULD REMOVE THE BANDAGE AND BEGIN DAILY DRESSING CHANGES AS FOLLOWS:     1) Remove Dressing.     2) Clean and dry the area with tap water using a Q-tip or sterile gauze pad.     3) Apply Vaseline, Aquaphor, Polysporin ointment or Bacitracin ointment over entire wound.  Do NOT use Neosporin ointment.     4) Cover the wound with a band-aid, or a sterile non-stick gauze pad and micropore paper tape      REPEAT THESE INSTRUCTIONS AT LEAST ONCE A DAY UNTIL THE WOUND HAS COMPLETELY HEALED.    It is an old wives tale that a wound heals better when it is exposed to air and allowed to dry out. The wound will heal faster with a better cosmetic result if it is kept moist with ointment and covered with a bandage.    **Do not let the wound dry out.**      Supplies Needed:      *Cotton tipped applicators (Q-tips)    *Polysporin Ointment or Bacitracin Ointment (NOT NEOSPORIN)    *Band-aids or non-stick gauze pads and micropore paper tape.      PATIENT INFORMATION:    During the healing process you will notice a number of changes. All wounds develop a small halo of redness surrounding the wound.  This means healing is occurring. Severe itching with extensive redness usually indicates sensitivity to the ointment or bandage tape used to dress the wound.  You should call our office if this develops.      Swelling  and/or discoloration around your surgical site is common, particularly when performed around the eye.    All wounds normally drain.  The larger the wound the more drainage there will be.  After 7-10 days, you will notice the wound beginning to shrink and new skin will begin to grow.  The wound is healed when you can see skin has formed over the entire area.  A healed wound has a healthy, shiny look to the surface and is red to dark pink in color  to normalize.  Wounds may take approximately 4-6 weeks to heal.  Larger wounds may take 6-8 weeks.  After the wound is healed you may discontinue dressing changes.    You may experience a sensation of tightness as your wound heals. This is normal and will gradually subside.    Your healed wound may be sensitive to temperature changes. This sensitivity improves with time, but if you re having a lot of discomfort, try to avoid temperature extremes.    Patients frequently experience itching after their wound appears to have healed because of the continue healing under the skin.  Plain Vaseline will help relieve the itching.        POSSIBLE COMPLICATIONS    BLEEDIN. Leave the bandage in place.  2. Use tightly rolled up gauze or a cloth to apply direct pressure over the bandage for 30  minutes.  3. Reapply pressure for an additional 30 minutes if necessary  4. Use additional gauze and tape to maintain pressure once the bleeding has stopped.      WOUND CARE INSTRUCTIONS   FOR CRYOSURGERY   This area treated with liquid nitrogen will form a blister. You do not need to bandage the area until after the blister forms and breaks (which may be a few days). When the blister breaks, begin daily dressing changes as follows:   1) Clean and dry the area with tap water using clean Q-tip or sterile gauze pad.   2) Apply Polysporin ointment or Bacitracin ointment over entire wound. Do NOT use Neosporin ointment.   3) Cover the wound with a band-aid or sterile non-stick gauze pad and micropore paper tape.   REPEAT THESE INSTRUCTIONS AT LEAST ONCE A DAY UNTIL THE WOUND HAS COMPLETELY HEALED.   It is an old wives tale that a wound heals better when it is exposed to air and allowed to dry out. The wound will heal faster with a better cosmetic result if it is kept moist with ointment and covered with a bandage.   Do not let the wound dry out.   IMPORTANT INFORMATION ON REVERSE SIDE   Supplies Needed:   *Cotton tipped applicators  (Q-tips)   *Polysporin ointment or Bacitracin ointment (NOT NEOSPORIN)   *Band-aids, or non stick gauze pads and micropore paper tape   PATIENT INFORMATION   During the healing process you will notice a number of changes. All wounds develop a small halo of redness surrounding the wound. This means healing is occurring. Severe itching with extensive redness usually indicates sensitivity to the ointment or bandage tape used to dress the wound. You should call our office if this develops.   Swelling and/or discoloration around your surgical site is common, particularly when performed around the eye.   All wounds normally drain. The larger the wound the more drainage there will be. After 7-10 days, you will notice the wound beginning to shrink and new skin will begin to grow. The wound is healed when you can see skin has formed over the entire area. A healed wound has a healthy, shiny look to the surface and is red to dark pink in color to normalize. Wounds may take approximately 4-6 weeks to heal. Larger wounds may take 6-8 weeks. After the wound is healed you may discontinue dressing changes.   You may experience a sensation of tightness as your wound heals. This is normal and will gradually subside.   Your healed wound may be sensitive to temperature changes. This sensitivity improves with time, but if you re having a lot of discomfort, try to avoid temperature extremes.   Patients frequently experience itching after their wound appears to have healed because of the continue healing under the skin. Plain Vaseline will help relieve the itching.

## 2018-05-22 NOTE — TELEPHONE ENCOUNTER
Patient notified. Patient verbalized understanding. Scheduled for MOHS Surgery x 3. Mohs brochure/Pre-op letter sent.   Andreea Lima RN

## 2018-05-23 ENCOUNTER — HOSPITAL ENCOUNTER (OUTPATIENT)
Dept: ULTRASOUND IMAGING | Facility: CLINIC | Age: 83
Discharge: HOME | End: 2018-05-23
Attending: FAMILY MEDICINE | Admitting: FAMILY MEDICINE
Payer: MEDICARE

## 2018-05-23 DIAGNOSIS — I71.40 ABDOMINAL AORTIC ANEURYSM (AAA) WITHOUT RUPTURE (H): ICD-10-CM

## 2018-05-23 PROCEDURE — 76775 US EXAM ABDO BACK WALL LIM: CPT

## 2018-06-05 ENCOUNTER — OFFICE VISIT (OUTPATIENT)
Dept: FAMILY MEDICINE | Facility: CLINIC | Age: 83
End: 2018-06-05
Payer: COMMERCIAL

## 2018-06-05 ENCOUNTER — RADIANT APPOINTMENT (OUTPATIENT)
Dept: GENERAL RADIOLOGY | Facility: CLINIC | Age: 83
End: 2018-06-05
Attending: FAMILY MEDICINE
Payer: COMMERCIAL

## 2018-06-05 VITALS
SYSTOLIC BLOOD PRESSURE: 124 MMHG | HEIGHT: 69 IN | HEART RATE: 60 BPM | BODY MASS INDEX: 32.29 KG/M2 | WEIGHT: 218 LBS | RESPIRATION RATE: 16 BRPM | TEMPERATURE: 97.6 F | DIASTOLIC BLOOD PRESSURE: 86 MMHG

## 2018-06-05 DIAGNOSIS — M25.512 LEFT SHOULDER PAIN, UNSPECIFIED CHRONICITY: Primary | ICD-10-CM

## 2018-06-05 DIAGNOSIS — M25.512 LEFT SHOULDER PAIN, UNSPECIFIED CHRONICITY: ICD-10-CM

## 2018-06-05 PROCEDURE — 99213 OFFICE O/P EST LOW 20 MIN: CPT | Performed by: FAMILY MEDICINE

## 2018-06-05 PROCEDURE — 73030 X-RAY EXAM OF SHOULDER: CPT | Mod: LT

## 2018-06-05 ASSESSMENT — PAIN SCALES - GENERAL: PAINLEVEL: MILD PAIN (2)

## 2018-06-05 NOTE — MR AVS SNAPSHOT
After Visit Summary   6/5/2018    Dragan Prasad    MRN: [id number]           Patient Information     Date Of Birth          1935        Visit Information        Provider Department      6/5/2018 9:40 AM Jorge Elizabeth MD [facility]        Today's Diagnoses     Left shoulder pain, unspecified chronicity    -  1      Care Instructions    1. This is bursitis     2. The aneurysm is stable.     3. Come back in 6 months for repeat US          Follow-ups after your visit        Your next 10 appointments already scheduled     Jun 13, 2018  7:45 AM CDT   MOHS with Javier Wadsworth MD   [facility]    [address] [address]   [phone]            Jun 18, 2018  7:45 AM CDT   MOHS with Javier Wadsworth MD   [facility] ([facility]    [address] [address]   [phone]            Jun 20, 2018  7:45 AM CDT   MOHS with Javier Wadsworth MD   [facility] ([facility])    [address] [address]   [phone]              Who to contact     If you have questions or need follow up information about today's clinic visit or your schedule please contact [facility] directly at [phone].  Normal or non-critical lab and imaging results will be communicated to you by MyChart, letter or phone within 4 business days after the clinic has received the results. If you do not hear from us within 7 days, please contact the clinic through MyChart or phone. If you have a critical or abnormal lab result, we will notify you by phone as soon as possible.  Submit refill requests through [employer] or call your pharmacy and they will forward the refill request to us. Please allow 3 business days for your refill to be completed.          Additional Information About Your Visit        [facility]  "Information     [employer] lets you send messages to your doctor, view your test results, renew your prescriptions, schedule appointments and more. To sign up, go to [email or web address] . Click on \"Log in\" on the left side of the screen, which will take you to the Welcome page. Then click on \"Sign up Now\" on the right side of the page.     You will be asked to enter the access code listed below, as well as some personal information. Please follow the directions to create your username and password.     Your access code is: [email or web address]  Expires: 2018 11:20 AM     Your access code will  in 90 days. If you need help or a new code, please call your [place] clinic or [phone].        Care EveryWhere ID     This is your [facility] EveryWhere ID. This could be used by other organizations to access your [place] medical records  [id number]        Your Vitals Were     Pulse Temperature Respirations Height BMI (Body Mass Index)       60 97.6  F (36.4  C) (Tympanic) 16 5' 9.25\" (1.759 m) 31.96 kg/m2        Blood Pressure from Last 3 Encounters:   18 124/86   18 160/89   16 155/88    Weight from Last 3 Encounters:   18 218 lb (98.9 kg)   17 206 lb 9.6 oz (93.7 kg)   16 201 lb (91.2 kg)               Primary Care Provider Office Phone # Fax #    Jorge Elizabeth [phone]       [address] [place] [address]        Equal Access to Services     [facility] : Hadii roselyn Clark, ren choi, qabrandan phillips . [facility] [phone].    ATENCIÓN: Si habla español, tiene a oscar disposición servicios gratuitos de asistencia lingüística. Llame al [phone].    We comply with applicable federal civil rights laws and [place] laws. We do not discriminate on the basis of race, color, national origin, age, disability, sex, sexual orientation, or gender identity.            Thank " you!     Thank you for choosing [facility]  for your care. Our goal is always to provide you with excellent care. Hearing back from our patients is one way we can continue to improve our services. Please take a few minutes to complete the written survey that you may receive in the mail after your visit with us. Thank you!             Your Updated Medication List - Protect others around you: Learn how to safely use, store and throw away your medicines at www.disposemymeds.org.          This list is accurate as of 6/5/18 10:37 AM.  Always use your most recent med list.                   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

## 2018-06-05 NOTE — PROGRESS NOTES
SUBJECTIVE:   Dragan Prasad is a 82 year old male who presents to clinic today for the following health issues:      Musculoskeletal problem/pain  Fell on left shoulder       Duration: 2 months    Description  Location: left shoulder and arm    Intensity:  2/10    Accompanying signs and symptoms: gets ache and tingling into left arm.     History  Previous similar problem: no   Previous evaluation:  none    Precipitating or alleviating factors:  Trauma or overuse: YES  Aggravating factors include: Trying to sleep and lifting of arm, but getting better.    Therapies tried and outcome:  mg 2 last night.           Problem list and histories reviewed & adjusted, as indicated.  Additional history: as documented    Patient Active Problem List   Diagnosis     Elevated blood pressure reading without diagnosis of hypertension     Family history of other cardiovascular diseases     Thoracic or lumbosacral neuritis or radiculitis, unspecified     Abdominal pain, right upper quadrant     Abdominal aortic aneurysm (H)     Benign neoplasm of skin     CAD (coronary artery disease)     CARDIOVASCULAR SCREENING; LDL GOAL LESS THAN 100     Past Surgical History:   Procedure Laterality Date     ARTHROSCOPY OF JOINT UNLISTED      right knee about      C APPENDECTOMY       SURGICAL HISTORY OF -   02    Basal cell carcinoma w/positive margins, right  eyebrow & eyelid       Social History   Substance Use Topics     Smoking status: Former Smoker     Types: Cigarettes     Quit date: 1986     Smokeless tobacco: Never Used     Alcohol use No     Family History   Problem Relation Age of Onset     CANCER Mother      lung cancer     C.A.D. Father      uncertain.  age 79     Unknown/Adopted Maternal Grandmother      Unknown/Adopted Maternal Grandfather      Unknown/Adopted Paternal Grandmother      Unknown/Adopted Paternal Grandfather      DIABETES Brother      Dre     C.A.CATALINA. Brother      Dre  age 64      "    Current Outpatient Prescriptions   Medication Sig Dispense Refill     ASPIRIN 325 MG PO TBEC ONE DAILY  3     No Known Allergies    Reviewed and updated as needed this visit by clinical staff  Tobacco  Allergies  Meds  Med Hx  Surg Hx  Fam Hx  Soc Hx      Reviewed and updated as needed this visit by Provider         ROS:  CONSTITUTIONAL: NEGATIVE for fever, chills, change in weight  ENT/MOUTH: NEGATIVE for ear, mouth and throat problems  RESP: NEGATIVE for significant cough or SOB  CV: NEGATIVE for chest pain, palpitations or peripheral edema    OBJECTIVE:     /86  Pulse 60  Temp 97.6  F (36.4  C) (Tympanic)  Resp 16  Ht 5' 9.25\" (1.759 m)  Wt 218 lb (98.9 kg)  BMI 31.96 kg/m2  Body mass index is 31.96 kg/(m^2).  GENERAL: healthy, alert and no distress  NECK: no adenopathy, no asymmetry, masses, or scars and thyroid normal to palpation  RESP: lungs clear to auscultation - no rales, rhonchi or wheezes  CV: regular rate and rhythm, normal S1 S2, no S3 or S4, no murmur, click or rub, no peripheral edema and peripheral pulses strong  ABDOMEN: soft, nontender, no hepatosplenomegaly, no masses and bowel sounds normal  MS: no gross musculoskeletal defects noted, no edema  Left shoulder with some pain with abduction.  Xray suggested calcific bursitis       ASSESSMENT/PLAN:             1. Left shoulder pain, unspecified chronicity  Bursitis   - XR Shoulder Left 2 Views; Future    ASSESSMENT/PLAN:      ICD-10-CM    1. Left shoulder pain, unspecified chronicity M25.512 XR Shoulder Left 2 Views       Patient Instructions   1. This is bursitis     2. The aneurysm is stable.     3. Come back in 6 months for repeat US          Jorge Elizabeth MD  [facility]  "

## 2018-06-05 NOTE — NURSING NOTE
"Chief Complaint   Patient presents with     Shoulder Pain       Initial /86  Pulse 60  Resp 16  Ht 5' 9.25\" (1.759 m)  Wt 218 lb (98.9 kg)  BMI 31.96 kg/m2 Estimated body mass index is 31.96 kg/(m^2) as calculated from the following:    Height as of this encounter: 5' 9.25\" (1.759 m).    Weight as of this encounter: 218 lb (98.9 kg).      Health Maintenance that is potentially due pending provider review:            Is there anyone who you would like to be able to receive your results? No  If yes have patient fill out JULIA    "

## 2018-06-13 ENCOUNTER — OFFICE VISIT (OUTPATIENT)
Dept: DERMATOLOGY | Facility: CLINIC | Age: 83
End: 2018-06-13
Payer: COMMERCIAL

## 2018-06-13 VITALS — HEART RATE: 56 BPM | OXYGEN SATURATION: 97 % | DIASTOLIC BLOOD PRESSURE: 97 MMHG | SYSTOLIC BLOOD PRESSURE: 158 MMHG

## 2018-06-13 DIAGNOSIS — L57.0 AK (ACTINIC KERATOSIS): Primary | ICD-10-CM

## 2018-06-13 DIAGNOSIS — C44.319 BASAL CELL CARCINOMA, FOREHEAD: ICD-10-CM

## 2018-06-13 DIAGNOSIS — C44.41 BASAL CELL CARCINOMA, SCALP/NECK: ICD-10-CM

## 2018-06-13 PROCEDURE — 17000 DESTRUCT PREMALG LESION: CPT | Mod: 51 | Performed by: DERMATOLOGY

## 2018-06-13 PROCEDURE — 17003 DESTRUCT PREMALG LES 2-14: CPT | Performed by: DERMATOLOGY

## 2018-06-13 PROCEDURE — 17311 MOHS 1 STAGE H/N/HF/G: CPT | Performed by: DERMATOLOGY

## 2018-06-13 PROCEDURE — 17311 MOHS 1 STAGE H/N/HF/G: CPT | Mod: 59 | Performed by: DERMATOLOGY

## 2018-06-13 PROCEDURE — 13132 CMPLX RPR F/C/C/M/N/AX/G/H/F: CPT | Mod: 59 | Performed by: DERMATOLOGY

## 2018-06-13 NOTE — NURSING NOTE
"Initial BP (!) 158/97 (BP Location: Left arm, Cuff Size: Adult Large)  Pulse 56  SpO2 97% Estimated body mass index is 31.96 kg/(m^2) as calculated from the following:    Height as of 6/5/18: 1.759 m (5' 9.25\").    Weight as of 6/5/18: 98.9 kg (218 lb). .      "

## 2018-06-13 NOTE — PROGRESS NOTES
Dragan Prasad is a 82 year old year old male patient here today for evaluation and managment ofbcc. Patient reports the following modifying factors none.  Associated symptoms: none.  Patient has no other skin complaints today.  Remainder of the HPI, Meds, PMH, Allergies, FH, and SH was reviewed in chart.  He notes some rough spot son right cheek today.      Past Medical History:   Diagnosis Date     Basal cell carcinoma      Diverticula of colon      Respiratory complications        Past Surgical History:   Procedure Laterality Date     ARTHROSCOPY OF JOINT UNLISTED      right knee about      C APPENDECTOMY       SURGICAL HISTORY OF -   02    Basal cell carcinoma w/positive margins, right  eyebrow & eyelid        Family History   Problem Relation Age of Onset     CANCER Mother      lung cancer     C.A.D. Father      uncertain.  age 79     Unknown/Adopted Maternal Grandmother      Unknown/Adopted Maternal Grandfather      Unknown/Adopted Paternal Grandmother      Unknown/Adopted Paternal Grandfather      DIABETES Brother      Dre     C.A.D. Brother      Dre  age 64       Social History     Social History     Marital status:      Spouse name: N/A     Number of children: N/A     Years of education: N/A     Occupational History     contracter Self     Social History Main Topics     Smoking status: Former Smoker     Types: Cigarettes     Quit date: 1986     Smokeless tobacco: Never Used     Alcohol use No     Drug use: No     Sexual activity: No     Other Topics Concern     Not on file     Social History Narrative       Outpatient Encounter Prescriptions as of 2018   Medication Sig Dispense Refill     ASPIRIN 325 MG PO TBEC ONE DAILY  3     No facility-administered encounter medications on file as of 2018.              Review Of Systems  Skin: As above  Eyes: negative  Ears/Nose/Throat: negative  Respiratory: No shortness of breath, dyspnea on exertion, cough, or  hemoptysis  Cardiovascular: negative  Gastrointestinal: negative  Genitourinary: negative  Musculoskeletal: negative  Neurologic: negative  Psychiatric: negative  Hematologic/Lymphatic/Immunologic: negative  Endocrine: negative      O:   NAD, WDWN, Alert & Oriented, Mood & Affect wnl, Vitals stable   Here today alone   BP (!) 158/97 (BP Location: Left arm, Cuff Size: Adult Large)  Pulse 56  SpO2 97%   General appearance normal   Vitals stable   Alert, oriented and in no acute distress     Gritty papules right cheek    L hair 8mm pink pearly papule   Mid forehead 7mm pink pearly papule       Eyes: Conjunctivae/lids:Normal     ENT: Lips, buccal mucosa, tongue: normal    MSK:Normal    Cardiovascular: peripheral edema none    Pulm: Breathing Normal    Neuro/Psych: Orientation:Normal; Mood/Affect:Normal      A/P:  1. Actinic keratosis R cheek x4  LN2:  Treated with LN2 for 5s for 1-2 cycles. Warned risks of blistering, pain, pigment change, scarring, and incomplete resolution.  Advised patient to return if lesions do not completely resolve.  Wound care sheet given.    2. L hairline basal cell carcinoma   MOHS:   Location    After PGACAC discussed with patient, decision for Mohs surgery was made. Indication for Mohs was Location. Patient confirmed the site with Dr. Wadsworth.  After anesthesia with LEC, the tumor was excised using standard Mohs technique in 1 stages(s).  CLEAR MARGINS OBTAINED and Final defect size was 1.2\ cm.       REPAIR SECOND INTENT: We discussed the options for wound management in full with the patient including risks/benefits/possible outcomes. Decision made to allow the wound to heal by second intention. EBL minimal; complications none; wound care routine.  The patient was discharged in good condition and will return in one month or prn for wound evaluation.    3. Mid forehead basal cell carcinoma   MOHS:   Location    After PGACAC discussed with patient, decision for Mohs surgery was made.  Indication for Mohs was Location. Patient confirmed the site with Dr. Wadsworth.  After anesthesia with LEC, the tumor was excised using standard Mohs technique in 1 stages(s).  CLEAR MARGINS OBTAINED and Final defect size was 1.3 cm.       REPAIR COMPLEX: Because of the tightness of the surrounding skin and Because of the size and full thickness nature of the defect, a complex closure was planned. After LEC anesthesia and prep, Burow's triangles were excised in the relaxed skin tension lines. The wound edges were widely undermined by dissection in the subcutaneous plane until adequate tissue mobility was obtained. Hemostasis was obtained. The wound edges were closed in a layered fashion using Vicryl and Fast Absorbing Plain Gut sutures. Postoperative length was 3.9 cm.   EBL minimal; complications none; wound care routine.  The patient was discharged in good condition and will return in one week for wound evaluation.    BENIGN LESIONS DISCUSSED WITH PATIENT:  I discussed the specifics of tumor, prognosis, and genetics of benign lesions.  I explained that treatment of these lesions would be purely cosmetic and not medically neccessary.  I discussed with patient different removal options including excision, cautery and /or laser.      Nature and genetics of benign skin lesions dicussed with patient.  Signs and Symptoms of skin cancer discussed with patient.  Patient to follow up with Primary Care provider regarding elevated blood pressure.  Patient encouraged to perform monthly skin exams.  UV precautions reviewed with patient.  Patient to follow up with Primary Care provider regarding elevated blood pressure.  Skin care regimen reviewed with patient: Eliminate harsh soaps, i.e. Dial, zest, irsih spring; Mild soaps such as Cetaphil or Dove sensitive skin, avoid hot or cold showers, aggressive use of emollients including vanicream, cetaphil or cerave discussed with patient.    Risks of non-melanoma skin cancer discussed  with patient   Return to clinic 6 months

## 2018-06-13 NOTE — PATIENT INSTRUCTIONS
Open Wound Care     for __hairline____________        ? No strenuous activity for 48 hours    ? Take Tylenol as needed for discomfort.                                                .         ? Do not drink alcoholic beverages for 48 hours.    ? Keep the pressure bandage in place for 24 hours. If the bandage becomes blood tinged or loose, reinforce it with gauze and tape.        (Refer to the reverse side of this page for management of bleeding).    ? Remove bandage in 24 hours and begin wound care as follows:     1. Clean area with tap water using a Q tip or gauze pad, (shower / bathe normally)  2. Dry wound with Q tip or gauze pad  3. Apply Aquaphor, Vaseline, Polysporin or Bacitracin Ointment with a Q tip    Do NOT use Neosporin Ointment *  4. Cover the wound with a band-aid or nonstick gauze pad and paper tape.  5. Repeat wound care once a day until wound is completely healed.    It is an old wives tale that a wound heals better when it is exposed to air and allowed to dry out. The wound will heal faster with a better cosmetic result if it is kept moist with ointment and covered with a bandage.  Do not let the wound dry out.      Supplies Needed:                Qtips or gauze pads                Polysporin or Bacitracin Ointment                Bandaids or nonstick gauze pads and paper tape    Wound care kits and brown paper tape are available for purchase at   the pharmacy.    BLEEDIN. Use tightly rolled up gauze or cloth to apply direct pressure over the bandage for 20   minutes.  2. Reapply pressure for an additional 20 minutes if necessary  3. Call the office or go to the nearest emergency room if pressure fails to stop the bleeding.  4. Use additional gauze and tape to maintain pressure once the bleeding has stopped.  5. Begin wound care 24 hours after surgery as directed.                  WOUND HEALING    1. One week after surgery a pink / red halo will form around the outside of the wound.   This is  new skin.  2. The center of the wound will appear yellowish white and produce some drainage.  3. The pink halo will slowly migrate in toward the center of the wound until the wound is covered with new shiny pink skin.  4. There will be no more drainage when the wound is completely healed.  5. It will take six months to one year for the redness to fade.  6. The scar may be itchy, tight and sensitive to extreme temperatures for a year after the surgery.  7. Massaging the area several times a day for several minutes after the wound is completely healed will help the scar soften and normalize faster. Begin massage only after healing is complete.      In case of emergency call: Dr Wadsworth: [phone]     [facility]: [phone]    [facility]: [phone]      Sutured Wound Care   Forehead    [facility]: [phone]    [facility]: [phone]          ? No strenuous activity for 48 hours. Resume moderate activity in 48 hours. No heavy exercising until you are seen for follow up in one week.     ? Take Tylenol as needed for discomfort.                         ? Do not drink alcoholic beverages for 48 hours.     ? Keep the pressure bandage in place for 24 hours. If the bandage becomes blood tinged or loose, reinforce it with gauze and tape.        (Refer to the reverse side of this page for management of bleeding).    ? Remove pressure bandage in 24 hours     ? Leave the flat bandage in place until your follow up appointment.    ? Keep the bandage dry. Wash around it carefully.    ? If the tape becomes soiled or starts to come off, reinforce it with additional paper tape.    ? Do not smoke for 3 weeks; smoking is detrimental to wound healing.    ? It is normal to have swelling and bruising around the surgical site. The bruising will fade in approximately 10-14 days. Elevate the area to reduce swelling.    ? Numbness, itchiness and sensitivity to temperature changes can occur  after surgery and may take up to 18 months to normalize.      POSSIBLE COMPLICATIONS    BLEEDIN. Leave the bandage in place.  2. Use tightly rolled up gauze or a cloth to apply direct pressure over the bandage for 20   minutes.  3. Reapply pressure for an additional 20 minutes if necessary  4. Call the office or go to the nearest emergency room if pressure fails to stop the bleeding.  5. Use additional gauze and tape to maintain pressure once the bleeding has stopped.        PAIN:    1. Post operative pain should slowly get better, never worse.  2. A severe increase in pain may indicate a problem. Call the office if this occurs.    In case of emergency phone:Dr Wadsworth [phone]

## 2018-06-13 NOTE — MR AVS SNAPSHOT
After Visit Summary   2018    Dragan Prasad    MRN: [id number]           Patient Information     Date Of Birth          1935        Visit Information        Provider Department      2018 7:45 AM Javier Wadsworth MD [facility]        Today's Diagnoses     AK (actinic keratosis)    -  1    Basal cell carcinoma, scalp/neck        Basal cell carcinoma, forehead          Care Instructions    Open Wound Care     for __hairline____________        ? No strenuous activity for 48 hours    ? Take Tylenol as needed for discomfort.                                                .         ? Do not drink alcoholic beverages for 48 hours.    ? Keep the pressure bandage in place for 24 hours. If the bandage becomes blood tinged or loose, reinforce it with gauze and tape.        (Refer to the reverse side of this page for management of bleeding).    ? Remove bandage in 24 hours and begin wound care as follows:     1. Clean area with tap water using a Q tip or gauze pad, (shower / bathe normally)  2. Dry wound with Q tip or gauze pad  3. Apply Aquaphor, Vaseline, Polysporin or Bacitracin Ointment with a Q tip    Do NOT use Neosporin Ointment *  4. Cover the wound with a band-aid or nonstick gauze pad and paper tape.  5. Repeat wound care once a day until wound is completely healed.    It is an old wives tale that a wound heals better when it is exposed to air and allowed to dry out. The wound will heal faster with a better cosmetic result if it is kept moist with ointment and covered with a bandage.  Do not let the wound dry out.      Supplies Needed:                Qtips or gauze pads                Polysporin or Bacitracin Ointment                Bandaids or nonstick gauze pads and paper tape    Wound care kits and brown paper tape are available for purchase at   the pharmacy.    BLEEDIN. Use tightly rolled up gauze or cloth to apply direct pressure over the bandage for 20    minutes.  2. Reapply pressure for an additional 20 minutes if necessary  3. Call the office or go to the nearest emergency room if pressure fails to stop the bleeding.  4. Use additional gauze and tape to maintain pressure once the bleeding has stopped.  5. Begin wound care 24 hours after surgery as directed.                  WOUND HEALING    1. One week after surgery a pink / red halo will form around the outside of the wound.   This is new skin.  2. The center of the wound will appear yellowish white and produce some drainage.  3. The pink halo will slowly migrate in toward the center of the wound until the wound is covered with new shiny pink skin.  4. There will be no more drainage when the wound is completely healed.  5. It will take six months to one year for the redness to fade.  6. The scar may be itchy, tight and sensitive to extreme temperatures for a year after the surgery.  7. Massaging the area several times a day for several minutes after the wound is completely healed will help the scar soften and normalize faster. Begin massage only after healing is complete.      In case of emergency call: Dr Wadsworth: [phone]     [facility]: [phone]    [facility]: [phone]      Sutured Wound Care   Forehead    [facility]: [phone]    [facility]: [phone]          ? No strenuous activity for 48 hours. Resume moderate activity in 48 hours. No heavy exercising until you are seen for follow up in one week.     ? Take Tylenol as needed for discomfort.                         ? Do not drink alcoholic beverages for 48 hours.     ? Keep the pressure bandage in place for 24 hours. If the bandage becomes blood tinged or loose, reinforce it with gauze and tape.        (Refer to the reverse side of this page for management of bleeding).    ? Remove pressure bandage in 24 hours     ? Leave the flat bandage in place until your follow up appointment.    ? Keep the  bandage dry. Wash around it carefully.    ? If the tape becomes soiled or starts to come off, reinforce it with additional paper tape.    ? Do not smoke for 3 weeks; smoking is detrimental to wound healing.    ? It is normal to have swelling and bruising around the surgical site. The bruising will fade in approximately 10-14 days. Elevate the area to reduce swelling.    ? Numbness, itchiness and sensitivity to temperature changes can occur after surgery and may take up to 18 months to normalize.      POSSIBLE COMPLICATIONS    BLEEDIN. Leave the bandage in place.  2. Use tightly rolled up gauze or a cloth to apply direct pressure over the bandage for 20   minutes.  3. Reapply pressure for an additional 20 minutes if necessary  4. Call the office or go to the nearest emergency room if pressure fails to stop the bleeding.  5. Use additional gauze and tape to maintain pressure once the bleeding has stopped.        PAIN:    1. Post operative pain should slowly get better, never worse.  2. A severe increase in pain may indicate a problem. Call the office if this occurs.    In case of emergency phone:Dr Wadsworth [phone]                  Follow-ups after your visit        Your next 10 appointments already scheduled     2018  7:45 AM CDT   [facility]    [address] [address] [phone]            2018  7:45 AM CDT   [facility]   [facility]    [address] [id number] [phone]              Who to contact     If you have questions or need follow up information about today's clinic visit or your schedule please contact [facility] directly at [phone].  Normal or non-critical lab and imaging results will be communicated to you by MyChart, letter or phone within 4 business days after  "the clinic has received the results. If you do not hear from us within 7 days, please contact the clinic through [employer] or phone. If you have a critical or abnormal lab result, we will notify you by phone as soon as possible.  Submit refill requests through [employer] or call your pharmacy and they will forward the refill request to us. Please allow 3 business days for your refill to be completed.          Additional Information About Your Visit        [employer] Information     [employer] lets you send messages to your doctor, view your test results, renew your prescriptions, schedule appointments and more. To sign up, go to [email or web address] . Click on \"Log in\" on the left side of the screen, which will take you to the Welcome page. Then click on \"Sign up Now\" on the right side of the page.     You will be asked to enter the access code listed below, as well as some personal information. Please follow the directions to create your username and password.     Your access code is: [email or web address]  Expires: 2018 11:20 AM     Your access code will  in 90 days. If you need help or a new code, please call your [place] clinic or [phone].        Care EveryWhere ID     This is your Care EveryWhere ID. This could be used by other organizations to access your [place] medical records  [id number]        Your Vitals Were     Pulse Pulse Oximetry                56 97%           Blood Pressure from Last 3 Encounters:   18 (!) 158/97   18 124/86   18 160/89    Weight from Last 3 Encounters:   18 98.9 kg (218 lb)   17 93.7 kg (206 lb 9.6 oz)   16 91.2 kg (201 lb)              We Performed the Following     DESTRUCT PREMALIGNANT LESION, 2-14     DESTRUCT PREMALIGNANT LESION, FIRST     MOHS HEAD/NCK/HND/FT/GEN 1ST STAGE UP T0 5 BLOCKS     REPAIR COMPLEX, WOUND HEAD/AXIL/GEN/HND/FT 2.6-7.5 CM        Primary Care Provider Office Phone # Fax #    Jorge Elizabeth MD " [phone]       [address] [facility] [address]        Equal Access to Services     GRACE RODRIGUEZ : Hadii aad ku hadteojames Anjanagray, wadeoda sherrieeleuterioha, carolata kalupisda jenncésar, brandan camiloin hayaachiquita barajaswellington vega laJosemukund richard. So [facility] [phone].    ATENCIÓN: Si habla español, tiene a oscar disposición servicios gratuitos de asistencia lingüística. Llame al [phone].    We comply with applicable federal civil rights laws and [place] laws. We do not discriminate on the basis of race, color, national origin, age, disability, sex, sexual orientation, or gender identity.            Thank you!     Thank you for choosing [facility]  for your care. Our goal is always to provide you with excellent care. Hearing back from our patients is one way we can continue to improve our services. Please take a few minutes to complete the written survey that you may receive in the mail after your visit with us. Thank you!             Your Updated Medication List - Protect others around you: Learn how to safely use, store and throw away your medicines at www.disposemymeds.org.          This list is accurate as of 6/13/18 10:58 AM.  Always use your most recent med list.                   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

## 2018-06-13 NOTE — LETTER
2018         RE: Dragan Prasad  [address] [address] [address]        Dear Colleague,    Thank you for referring your patient, Dragan Prasad, to the [facility]. Please see a copy of my visit note below.    Dragan Prasad is a 82 year old year old male patient here today for evaluation and managment ofbcc. Patient reports the following modifying factors none.  Associated symptoms: none.  Patient has no other skin complaints today.  Remainder of the HPI, Meds, PMH, Allergies, FH, and SH was reviewed in chart.  He notes some rough spot son right cheek today.      Past Medical History:   Diagnosis Date     Basal cell carcinoma      Diverticula of colon      Respiratory complications        Past Surgical History:   Procedure Laterality Date     ARTHROSCOPY OF JOINT UNLISTED      right knee about      C APPENDECTOMY       SURGICAL HISTORY OF -   02    Basal cell carcinoma w/positive margins, right  eyebrow & eyelid        Family History   Problem Relation Age of Onset     CANCER Mother      lung cancer     C.A.D. Father      uncertain.  age 79     Unknown/Adopted Maternal Grandmother      Unknown/Adopted Maternal Grandfather      Unknown/Adopted Paternal Grandmother      Unknown/Adopted Paternal Grandfather      DIABETES Brother      Dre     C.A.D. Brother      Dre  age 64       Social History     Social History     Marital status:      Spouse name: N/A     Number of children: N/A     Years of education: N/A     Occupational History     contracter Self     Social History Main Topics     Smoking status: Former Smoker     Types: Cigarettes     Quit date: 1986     Smokeless tobacco: Never Used     Alcohol use No     Drug use: No     Sexual activity: No     Other Topics Concern     Not on file     Social History Narrative       Outpatient Encounter Prescriptions as of 2018   Medication Sig Dispense Refill     ASPIRIN 325 MG PO TBEC ONE DAILY  3      No facility-administered encounter medications on file as of 6/13/2018.              Review Of Systems  Skin: As above  Eyes: negative  Ears/Nose/Throat: negative  Respiratory: No shortness of breath, dyspnea on exertion, cough, or hemoptysis  Cardiovascular: negative  Gastrointestinal: negative  Genitourinary: negative  Musculoskeletal: negative  Neurologic: negative  Psychiatric: negative  Hematologic/Lymphatic/Immunologic: negative  Endocrine: negative      O:   NAD, WDWN, Alert & Oriented, Mood & Affect wnl, Vitals stable   Here today alone   BP (!) 158/97 (BP Location: Left arm, Cuff Size: Adult Large)  Pulse 56  SpO2 97%   General appearance normal   Vitals stable   Alert, oriented and in no acute distress     Gritty papules right cheek    L hair 8mm pink pearly papule   Mid forehead 7mm pink pearly papule       Eyes: Conjunctivae/lids:Normal     ENT: Lips, buccal mucosa, tongue: normal    MSK:Normal    Cardiovascular: peripheral edema none    Pulm: Breathing Normal    Neuro/Psych: Orientation:Normal; Mood/Affect:Normal      A/P:  1. Actinic keratosis R cheek x4  LN2:  Treated with LN2 for 5s for 1-2 cycles. Warned risks of blistering, pain, pigment change, scarring, and incomplete resolution.  Advised patient to return if lesions do not completely resolve.  Wound care sheet given.    2. L hairline basal cell carcinoma   MOHS:   Location    After PGACAC discussed with patient, decision for Mohs surgery was made. Indication for Mohs was Location. Patient confirmed the site with Dr. Wadsworth.  After anesthesia with LEC, the tumor was excised using standard Mohs technique in 1 stages(s).  CLEAR MARGINS OBTAINED and Final defect size was 1.2\ cm.       REPAIR SECOND INTENT: We discussed the options for wound management in full with the patient including risks/benefits/possible outcomes. Decision made to allow the wound to heal by second intention. EBL minimal; complications none; wound care routine.  The  patient was discharged in good condition and will return in one month or prn for wound evaluation.    3. Mid forehead basal cell carcinoma   MOHS:   Location    After PGACAC discussed with patient, decision for Mohs surgery was made. Indication for Mohs was Location. Patient confirmed the site with Dr. Wadsworth.  After anesthesia with LEC, the tumor was excised using standard Mohs technique in 1 stages(s).  CLEAR MARGINS OBTAINED and Final defect size was 1.3 cm.       REPAIR COMPLEX: Because of the tightness of the surrounding skin and Because of the size and full thickness nature of the defect, a complex closure was planned. After LEC anesthesia and prep, Burow's triangles were excised in the relaxed skin tension lines. The wound edges were widely undermined by dissection in the subcutaneous plane until adequate tissue mobility was obtained. Hemostasis was obtained. The wound edges were closed in a layered fashion using Vicryl and Fast Absorbing Plain Gut sutures. Postoperative length was 3.9 cm.   EBL minimal; complications none; wound care routine.  The patient was discharged in good condition and will return in one week for wound evaluation.    BENIGN LESIONS DISCUSSED WITH PATIENT:  I discussed the specifics of tumor, prognosis, and genetics of benign lesions.  I explained that treatment of these lesions would be purely cosmetic and not medically neccessary.  I discussed with patient different removal options including excision, cautery and /or laser.      Nature and genetics of benign skin lesions dicussed with patient.  Signs and Symptoms of skin cancer discussed with patient.  Patient to follow up with Primary Care provider regarding elevated blood pressure.  Patient encouraged to perform monthly skin exams.  UV precautions reviewed with patient.  Patient to follow up with Primary Care provider regarding elevated blood pressure.  Skin care regimen reviewed with patient: Eliminate harsh soaps, i.e. Dial,  zest, irs spring; Mild soaps such as Cetaphil or Dove sensitive skin, avoid hot or cold showers, aggressive use of emollients including vanicream, cetaphil or cerave discussed with patient.    Risks of non-melanoma skin cancer discussed with patient   Return to clinic 6 months      Again, thank you for allowing me to participate in the care of your patient.        Sincerely,        Javier Wadsworth MD

## 2018-06-18 ENCOUNTER — ALLIED HEALTH/NURSE VISIT (OUTPATIENT)
Dept: DERMATOLOGY | Facility: CLINIC | Age: 83
End: 2018-06-18
Payer: COMMERCIAL

## 2018-06-18 ENCOUNTER — OFFICE VISIT (OUTPATIENT)
Dept: DERMATOLOGY | Facility: CLINIC | Age: 83
End: 2018-06-18
Payer: COMMERCIAL

## 2018-06-18 VITALS
DIASTOLIC BLOOD PRESSURE: 89 MMHG | OXYGEN SATURATION: 97 % | HEART RATE: 68 BPM | SYSTOLIC BLOOD PRESSURE: 157 MMHG | TEMPERATURE: 97.6 F

## 2018-06-18 DIAGNOSIS — Z48.01 ENCOUNTER FOR CHANGE OR REMOVAL OF SURGICAL WOUND DRESSING: Primary | ICD-10-CM

## 2018-06-18 DIAGNOSIS — C44.519 BASAL CELL CARCINOMA OF BACK: Primary | ICD-10-CM

## 2018-06-18 PROCEDURE — 88331 PATH CONSLTJ SURG 1 BLK 1SPC: CPT | Performed by: DERMATOLOGY

## 2018-06-18 PROCEDURE — 11602 EXC TR-EXT MAL+MARG 1.1-2 CM: CPT | Mod: 79 | Performed by: DERMATOLOGY

## 2018-06-18 PROCEDURE — 99207 ZZC NO CHARGE NURSE ONLY: CPT

## 2018-06-18 NOTE — PROGRESS NOTES
Dragan Prasad is a 82 year old year old male patient here today for evaluation and managment of basal cell carcinoma on back x2.  Forehead healing well. Patient reports the following modifying factors none.  Associated symptoms: none.  Patient has no other skin complaints today.  Remainder of the HPI, Meds, PMH, Allergies, FH, and SH was reviewed in chart.      Past Medical History:   Diagnosis Date     Basal cell carcinoma      Diverticula of colon      Respiratory complications        Past Surgical History:   Procedure Laterality Date     ARTHROSCOPY OF JOINT UNLISTED      right knee about      C APPENDECTOMY       SURGICAL HISTORY OF -   02    Basal cell carcinoma w/positive margins, right  eyebrow & eyelid        Family History   Problem Relation Age of Onset     CANCER Mother      lung cancer     C.A.D. Father      uncertain.  age 79     Unknown/Adopted Maternal Grandmother      Unknown/Adopted Maternal Grandfather      Unknown/Adopted Paternal Grandmother      Unknown/Adopted Paternal Grandfather      DIABETES Brother      Dre     C.A.D. Brother      Dre  age 64       Social History     Social History     Marital status:      Spouse name: N/A     Number of children: N/A     Years of education: N/A     Occupational History     contracter Self     Social History Main Topics     Smoking status: Former Smoker     Types: Cigarettes     Quit date: 1986     Smokeless tobacco: Never Used     Alcohol use No     Drug use: No     Sexual activity: No     Other Topics Concern     Not on file     Social History Narrative       Outpatient Encounter Prescriptions as of 2018   Medication Sig Dispense Refill     ASPIRIN 325 MG PO TBEC ONE DAILY  3     No facility-administered encounter medications on file as of 2018.              Review Of Systems  Skin: As above  Eyes: negative  Ears/Nose/Throat: negative  Respiratory: No shortness of breath, dyspnea on exertion, cough, or  hemoptysis  Cardiovascular: negative  Gastrointestinal: negative  Genitourinary: negative  Musculoskeletal: negative  Neurologic: negative  Psychiatric: negative  Hematologic/Lymphatic/Immunologic: negative  Endocrine: negative      O:   NAD, WDWN, Alert & Oriented, Mood & Affect wnl, Vitals stable   Here today alone   /89  Pulse 68  Temp 97.6  F (36.4  C)  SpO2 97%   General appearance normal   Vitals stable   Alert, oriented and in no acute distress     Forehead healing well   Mid lower back 7mm pink pearly papule    Mid upper back 7mm red plaque       Eyes: Conjunctivae/lids:Normal     ENT: Lips, buccal mucosa, tongue: normal    MSK:Normal    Cardiovascular: peripheral edema none    Pulm: Breathing Normal    Neuro/Psych: Orientation:Normal; Mood/Affect:Normal      MICRO:     Mid lower back:Unremarkable epidermis with parallel bundles of cellular collagen within the superficial dermis.  No concerning areas for malignancy.     <Mid upper back:Unremarkable epidermis with parallel bundles of cellular collagen within the superficial dermis.  No concerning areas for malignancy.     A/P:  1. Mid lower back basal cell carcinoma   EXCISION OF BASAL CELL CARCINOMA, Margins confirmed with FROZEN SECTIONS AND Second intent: After thorough discussion of PGACAC, consent obtained, anesthesia and prep, the margins of the lesion were identified and an elliptical incision was made encompassing the lesion with 4mm margin. The incisions were made through the skin and down to and including the superficial dermis.  The lesion was removed en bloc and submitted for frozen section pathologic review. Clear margins obtained (1.3cm).    REPAIR SECOND INTENT: We discussed the options for wound management in full with the patient including risks/benefits/possible outcomes. Decision made to allow the wound to heal by second intention. EBL minimal; complications none; wound care routine.  The patient was discharged in good condition  and will return in one month or prn for wound evaluation.     2. Mid upper back basal cell carcinoma   EXCISION OF BASAL CELL CARCINOMA, Margins confirmed with FROZEN SECTIONS AND Second intent: After thorough discussion of PGACAC, consent obtained, anesthesia and prep, the margins of the lesion were identified and an elliptical incision was made encompassing the lesion with 4mm margin. The incisions were made through the skin and down to and including the superficial dermis.  The lesion was removed en bloc and submitted for frozen section pathologic review. Clear margins obtained (1.3cm).    REPAIR SECOND INTENT: We discussed the options for wound management in full with the patient including risks/benefits/possible outcomes. Decision made to allow the wound to heal by second intention. EBL minimal; complications none; wound care routine.  The patient was discharged in good condition and will return in one month or prn for wound evaluation.       BENIGN LESIONS DISCUSSED WITH PATIENT:  I discussed the specifics of tumor, prognosis, and genetics of benign lesions.  I explained that treatment of these lesions would be purely cosmetic and not medically neccessary.  I discussed with patient different removal options including excision, cautery and /or laser.      Nature and genetics of benign skin lesions dicussed with patient.  Signs and Symptoms of skin cancer discussed with patient.  Patient to follow up with Primary Care provider regarding elevated blood pressure.  Patient encouraged to perform monthly skin exams.  UV precautions reviewed with patient.  Patient to follow up with Primary Care provider regarding elevated blood pressure.  Skin care regimen reviewed with patient: Eliminate harsh soaps, i.e. Dial, zest, irsih spring; Mild soaps such as Cetaphil or Dove sensitive skin, avoid hot or cold showers, aggressive use of emollients including vanicream, cetaphil or cerave discussed with patient.    Risks of  non-melanoma skin cancer discussed with patient   Return to clinic next appt

## 2018-06-18 NOTE — MR AVS SNAPSHOT
After Visit Summary   6/18/2018    Dragan Prasad    MRN: [id number]           Patient Information     Date Of Birth          1935        Visit Information        Provider Department      [facility]        Today's Diagnoses     Encounter for change or removal of surgical wound dressing    -  1      Care Instructions    WOUND CARE INSTRUCTIONS  for  ONE WEEK AFTER SURGERY          1) Leave flat bandage on your skin for one week after today s bandage change.  2) In one week when you remove the bandage, you may resume your regular skin care routine, including washing with mild soap and water, applying moisturizer, make-up and sunscreen.    3) If there are any open or bleeding areas at the incision/graft site you should begin to cover the area with a bandage daily as follows:    1) Clean and dry the area with plain tap water using a Q-tip or sterile gauze pad.  2) Apply Polysporin or Bacitracin ointment to the open area.  3) Cover the wound with a band-aid or a sterile non-stick gauze pad and micropore paper tape.         SIGNS OF INFECTION  - If you notice any of these signs of infection, call your doctor right away: expanding redness around the wound.  - Yellow or greenish-colored pus or cloudy wound drainage.    - Red streaking spreading from the wound.  - Increased swelling, tenderness, or pain around the wound.   - Fever.    Please remember that yellow and clear drainage from a wound can be normal and related to normal wound healing.  Isolated drainage from a wound without a combination of the above features does not indicate infection.       *Once the bandages are removed, the scar will be red and firm (especially in the lip/chin area). This is normal and will fade in time. It might take 6-12 months for this to happen.     *Massaging the area will help the scar soften and fade quicker. Begin to massage the area one month after the bandages have  been removed. To massage apply pressure directly and firmly over the scar with the fingertips and move in a circular motion. Massage the area for a few minutes several times a day. Continue to massage the site for several months.    *Approximately 6-8 weeks after surgery it is not uncommon to see the formation of  tender pimple-like  bump along the scar. This is normal. As the scar continues to mature and the stitches underneath the skin begin to dissolve, this might occur. Do not pick or squeeze, this will resolve on it s own. Should one break open producing a small amount of drainage, apply Polysporin or Bacitracin ointment a few times a day until the wound is completely healed.    *Numbness in the surgical area is expected. It might take 12-18 months for the feeling to return to normal. During this time sensations of itchiness, tingling and occasional sharp pains might be noted. These feelings are normal and will subside once the nerves have completely healed.         IN CASE OF EMERGENCY: Dr Wadsworth [phone]     If you were seen in [place] call: [phone]    If you were seen in [place] call: [phone]            Follow-ups after your visit        Your next 10 appointments already scheduled     Jun 18, 2018  2:45 PM CDT   Nurse Only with [facility] D[facility] Nurse   [facility]    [address] [address]   [phone]            Jul 24, 2018  8:00 AM CDT   [facility] with Javier Wadsworth MD   [facility] ([facility])    [address] [address]   [phone]            Aug 07, 2018  8:00 AM CDT   MOHS with Javier Wadsworth MD   [facility] ([facility])    [address] [address]   [phone]              Who to contact     If you have questions or need follow up information about today's clinic visit or your schedule please contact [facility] directly at [phone].  Normal or non-critical lab and imaging results will be communicated to you by MyChart, letter or phone within 4 business days after the clinic has received the results. If you do not hear from us within 7 days, please contact the clinic through [employer] or phone. If you have a critical or abnormal lab result, we will notify you by phone as soon as possible.  Submit refill requests through [employer] or call your pharmacy and they will forward the refill request to us. Please allow 3 business days for your refill to be completed.          Additional Information About Your Visit        [employer] Information     [employer] lets you send messages to your doctor, view your test results, renew your prescriptions, schedule appointments and more. To sign up, go to [email or web address] . Click on \"Log in\" on the left side of the screen, which will take you to the Welcome page. Then click on \"Sign up Now\" on the right side of the page.     You will be asked to enter the access code listed below, as well as some personal information. Please follow the directions to create your username and password.     Your access code is: [email or web address]  Expires: 2018 11:20 AM     Your access code will  in 90 days. If you need help or a new code, please call your [facility] clinic or [phone].        Care EveryWhere ID     This is your Care EveryWhere ID. This could be used by other organizations to access your [facility] medical records  [id number]         Blood Pressure from Last 3 Encounters:   18 157/89   18 (!) 158/97   18 124/86    Weight from Last 3 Encounters:   18 98.9 kg (218 lb)   17 93.7 kg (206 lb 9.6 oz)   16 91.2 kg (201 lb)              Today, you had the following     No orders found for display       Primary Care Provider Office Phone # Fax #    Jorge Elizabeth [phone]       [address] [place] [address]" [place] [address]        Equal Access to Services     GRAY RODRIGUEZ : Hadii aad ku hadteojames Sydneyali, wadeoda luqadaha, qaybta kalupistao deleon, brandan richard. So [facility] [phone].    ATENCIÓN: Si habla español, tiene a oscar disposición servicios gratuitos de asistencia lingüística. Llame al [phone].    We comply with applicable federal civil rights laws and [place] laws. We do not discriminate on the basis of race, color, national origin, age, disability, sex, sexual orientation, or gender identity.            Thank you!     Thank you for choosing [facility]  for your care. Our goal is always to provide you with excellent care. Hearing back from our patients is one way we can continue to improve our services. Please take a few minutes to complete the written survey that you may receive in the mail after your visit with us. Thank you!             Your Updated Medication List - Protect others around you: Learn how to safely use, store and throw away your medicines at www.disposemymeds.org.          This list is accurate as of 6/18/18  8:57 AM.  Always use your most recent med list.                   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

## 2018-06-18 NOTE — NURSING NOTE
Pt returned to clinic for post surgery 1 week follow up bandage change. Pt has no complaints, denies pain. Bandage removed mid forehead, area cleansed with normal saline. Site is healing and wound edges approximating well. Reapplied new steri strips and paper tape.    Advised to watch for signs/sx of infection; spreading redness, drainage, odor, fever. Call or report promptly to clinic. Pt given written instructions and informed to rtc as needed. Patient verbalized understanding.     Melvi Katz LPN.................6/18/2018

## 2018-06-18 NOTE — PATIENT INSTRUCTIONS
Open Wound Care     for ______________        ? No strenuous activity for 48 hours    ? Take Tylenol as needed for discomfort.                                                .         ? Do not drink alcoholic beverages for 48 hours.    ? Keep the pressure bandage in place for 24 hours. If the bandage becomes blood tinged or loose, reinforce it with gauze and tape.        (Refer to the reverse side of this page for management of bleeding).    ? Remove bandage in 24 hours and begin wound care as follows:     1. Clean area with tap water using a Q tip or gauze pad, (shower / bathe normally)  2. Dry wound with Q tip or gauze pad  3. Apply Aquaphor, Vaseline, Polysporin or Bacitracin Ointment with a Q tip    Do NOT use Neosporin Ointment *  4. Cover the wound with a band-aid or nonstick gauze pad and paper tape.  5. Repeat wound care once a day until wound is completely healed.    It is an old wives tale that a wound heals better when it is exposed to air and allowed to dry out. The wound will heal faster with a better cosmetic result if it is kept moist with ointment and covered with a bandage.  Do not let the wound dry out.      Supplies Needed:                Qtips or gauze pads                Polysporin or Bacitracin Ointment                Bandaids or nonstick gauze pads and paper tape    Wound care kits and brown paper tape are available for purchase at   the pharmacy.    BLEEDIN. Use tightly rolled up gauze or cloth to apply direct pressure over the bandage for 20   minutes.  2. Reapply pressure for an additional 20 minutes if necessary  3. Call the office or go to the nearest emergency room if pressure fails to stop the bleeding.  4. Use additional gauze and tape to maintain pressure once the bleeding has stopped.  5. Begin wound care 24 hours after surgery as directed.                  WOUND HEALING    1. One week after surgery a pink / red halo will form around the outside of the wound.   This is new  skin.  2. The center of the wound will appear yellowish white and produce some drainage.  3. The pink halo will slowly migrate in toward the center of the wound until the wound is covered with new shiny pink skin.  4. There will be no more drainage when the wound is completely healed.  5. It will take six months to one year for the redness to fade.  6. The scar may be itchy, tight and sensitive to extreme temperatures for a year after the surgery.  7. Massaging the area several times a day for several minutes after the wound is completely healed will help the scar soften and normalize faster. Begin massage only after healing is complete.      In case of emergency call: Dr Wadsworth: [phone]     [facility]: [phone]    [facility]: [phone]      WOUND CARE INSTRUCTIONS  for  ONE WEEK AFTER SURGERY    forehead      1) Leave flat bandage on your skin for one week after today s bandage change.  2) In one week when you remove the bandage, you may resume your regular skin care routine, including washing with mild soap and water, applying moisturizer, make-up and sunscreen.    3) If there are any open or bleeding areas at the incision/graft site you should begin to cover the area with a bandage daily as follows:    1) Clean and dry the area with plain tap water using a Q-tip or sterile gauze pad.  2) Apply Polysporin or Bacitracin ointment to the open area.  3) Cover the wound with a band-aid or a sterile non-stick gauze pad and micropore paper tape.         SIGNS OF INFECTION  - If you notice any of these signs of infection, call your doctor right away: expanding redness around the wound.  - Yellow or greenish-colored pus or cloudy wound drainage.    - Red streaking spreading from the wound.  - Increased swelling, tenderness, or pain around the wound.   - Fever.    Please remember that yellow and clear drainage from a wound can be normal and related to normal wound healing.  Isolated drainage  from a wound without a combination of the above features does not indicate infection.       *Once the bandages are removed, the scar will be red and firm (especially in the lip/chin area). This is normal and will fade in time. It might take 6-12 months for this to happen.     *Massaging the area will help the scar soften and fade quicker. Begin to massage the area one month after the bandages have been removed. To massage apply pressure directly and firmly over the scar with the fingertips and move in a circular motion. Massage the area for a few minutes several times a day. Continue to massage the site for several months.    *Approximately 6-8 weeks after surgery it is not uncommon to see the formation of  tender pimple-like  bump along the scar. This is normal. As the scar continues to mature and the stitches underneath the skin begin to dissolve, this might occur. Do not pick or squeeze, this will resolve on it s own. Should one break open producing a small amount of drainage, apply Polysporin or Bacitracin ointment a few times a day until the wound is completely healed.    *Numbness in the surgical area is expected. It might take 12-18 months for the feeling to return to normal. During this time sensations of itchiness, tingling and occasional sharp pains might be noted. These feelings are normal and will subside once the nerves have completely healed.         IN CASE OF EMERGENCY: Dr Wadsworth [phone]     If you were seen in [place] call: [phone]    If you were seen in [place] call: [phone]    WOUND CARE INSTRUCTIONS   FOR CRYOSURGERY   This area treated with liquid nitrogen will form a blister. You do not need to bandage the area until after the blister forms and breaks (which may be a few days). When the blister breaks, begin daily dressing changes as follows:   1) Clean and dry the area with tap water using clean Q-tip or sterile gauze pad.   2) Apply Polysporin ointment or Bacitracin  ointment over entire wound. Do NOT use Neosporin ointment.   3) Cover the wound with a band-aid or sterile non-stick gauze pad and micropore paper tape.   REPEAT THESE INSTRUCTIONS AT LEAST ONCE A DAY UNTIL THE WOUND HAS COMPLETELY HEALED.   It is an old wives tale that a wound heals better when it is exposed to air and allowed to dry out. The wound will heal faster with a better cosmetic result if it is kept moist with ointment and covered with a bandage.   Do not let the wound dry out.   IMPORTANT INFORMATION ON REVERSE SIDE   Supplies Needed:   *Cotton tipped applicators (Q-tips)   *Polysporin ointment or Bacitracin ointment (NOT NEOSPORIN)   *Band-aids, or non stick gauze pads and micropore paper tape   PATIENT INFORMATION   During the healing process you will notice a number of changes. All wounds develop a small halo of redness surrounding the wound. This means healing is occurring. Severe itching with extensive redness usually indicates sensitivity to the ointment or bandage tape used to dress the wound. You should call our office if this develops.   Swelling and/or discoloration around your surgical site is common, particularly when performed around the eye.   All wounds normally drain. The larger the wound the more drainage there will be. After 7-10 days, you will notice the wound beginning to shrink and new skin will begin to grow. The wound is healed when you can see skin has formed over the entire area. A healed wound has a healthy, shiny look to the surface and is red to dark pink in color to normalize. Wounds may take approximately 4-6 weeks to heal. Larger wounds may take 6-8 weeks. After the wound is healed you may discontinue dressing changes.   You may experience a sensation of tightness as your wound heals. This is normal and will gradually subside.   Your healed wound may be sensitive to temperature changes. This sensitivity improves with time, but if you re having a lot of discomfort, try to  avoid temperature extremes.   Patients frequently experience itching after their wound appears to have healed because of the continue healing under the skin. Plain Vaseline will help relieve the itching.

## 2018-06-18 NOTE — NURSING NOTE
Chief Complaint   Patient presents with     Derm Problem     mohs       Vitals:    06/18/18 0755   BP: 157/89   Pulse: 68   Temp: 97.6  F (36.4  C)   SpO2: 97%     Wt Readings from Last 1 Encounters:   06/05/18 98.9 kg (218 lb)         Melvi Katz LPN.................6/18/2018

## 2018-06-18 NOTE — PATIENT INSTRUCTIONS

## 2018-06-18 NOTE — LETTER
2018         RE: Dragan Prasad  [address] [facility] [address]        Dear Colleague,    Thank you for referring your patient, Dragan Prasad, to the [facility]. Please see a copy of my visit note below.    Dragan Prasad is a 82 year old year old male patient here today for evaluation and managment of basal cell carcinoma on back x2.  Forehead healing well. Patient reports the following modifying factors none.  Associated symptoms: none.  Patient has no other skin complaints today.  Remainder of the HPI, Meds, PMH, Allergies, FH, and SH was reviewed in chart.      Past Medical History:   Diagnosis Date     Basal cell carcinoma      Diverticula of colon      Respiratory complications        Past Surgical History:   Procedure Laterality Date     ARTHROSCOPY OF JOINT UNLISTED      right knee about      C APPENDECTOMY       SURGICAL HISTORY OF -   02    Basal cell carcinoma w/positive margins, right  eyebrow & eyelid        Family History   Problem Relation Age of Onset     CANCER Mother      lung cancer     C.A.D. Father      uncertain.  age 79     Unknown/Adopted Maternal Grandmother      Unknown/Adopted Maternal Grandfather      Unknown/Adopted Paternal Grandmother      Unknown/Adopted Paternal Grandfather      DIABETES Brother      Dre     C.A.D. Brother      Dre  age 64       Social History     Social History     Marital status:      Spouse name: N/A     Number of children: N/A     Years of education: N/A     Occupational History     contracter Self     Social History Main Topics     Smoking status: Former Smoker     Types: Cigarettes     Quit date: 1986     Smokeless tobacco: Never Used     Alcohol use No     Drug use: No     Sexual activity: No     Other Topics Concern     Not on file     Social History Narrative       Outpatient Encounter Prescriptions as of 2018   Medication Sig Dispense Refill     ASPIRIN 325 MG PO TBEC ONE DAILY   3     No facility-administered encounter medications on file as of 6/18/2018.              Review Of Systems  Skin: As above  Eyes: negative  Ears/Nose/Throat: negative  Respiratory: No shortness of breath, dyspnea on exertion, cough, or hemoptysis  Cardiovascular: negative  Gastrointestinal: negative  Genitourinary: negative  Musculoskeletal: negative  Neurologic: negative  Psychiatric: negative  Hematologic/Lymphatic/Immunologic: negative  Endocrine: negative      O:   NAD, WDWN, Alert & Oriented, Mood & Affect wnl, Vitals stable   Here today alone   /89  Pulse 68  Temp 97.6  F (36.4  C)  SpO2 97%   General appearance normal   Vitals stable   Alert, oriented and in no acute distress     Forehead healing well   Mid lower back 7mm pink pearly papule    Mid upper back 7mm red plaque       Eyes: Conjunctivae/lids:Normal     ENT: Lips, buccal mucosa, tongue: normal    MSK:Normal    Cardiovascular: peripheral edema none    Pulm: Breathing Normal    Neuro/Psych: Orientation:Normal; Mood/Affect:Normal      MICRO:     Mid lower back:Unremarkable epidermis with parallel bundles of cellular collagen within the superficial dermis.  No concerning areas for malignancy.     <Mid upper back:Unremarkable epidermis with parallel bundles of cellular collagen within the superficial dermis.  No concerning areas for malignancy.     A/P:  1. Mid lower back basal cell carcinoma   EXCISION OF BASAL CELL CARCINOMA, Margins confirmed with FROZEN SECTIONS AND Second intent: After thorough discussion of PGACAC, consent obtained, anesthesia and prep, the margins of the lesion were identified and an elliptical incision was made encompassing the lesion with 4mm margin. The incisions were made through the skin and down to and including the superficial dermis.  The lesion was removed en bloc and submitted for frozen section pathologic review. Clear margins obtained (1.3cm).    REPAIR SECOND INTENT: We discussed the options for wound  management in full with the patient including risks/benefits/possible outcomes. Decision made to allow the wound to heal by second intention. EBL minimal; complications none; wound care routine.  The patient was discharged in good condition and will return in one month or prn for wound evaluation.     2. Mid upper back basal cell carcinoma   EXCISION OF BASAL CELL CARCINOMA, Margins confirmed with FROZEN SECTIONS AND Second intent: After thorough discussion of PGACAC, consent obtained, anesthesia and prep, the margins of the lesion were identified and an elliptical incision was made encompassing the lesion with 4mm margin. The incisions were made through the skin and down to and including the superficial dermis.  The lesion was removed en bloc and submitted for frozen section pathologic review. Clear margins obtained (1.3cm).    REPAIR SECOND INTENT: We discussed the options for wound management in full with the patient including risks/benefits/possible outcomes. Decision made to allow the wound to heal by second intention. EBL minimal; complications none; wound care routine.  The patient was discharged in good condition and will return in one month or prn for wound evaluation.       BENIGN LESIONS DISCUSSED WITH PATIENT:  I discussed the specifics of tumor, prognosis, and genetics of benign lesions.  I explained that treatment of these lesions would be purely cosmetic and not medically neccessary.  I discussed with patient different removal options including excision, cautery and /or laser.      Nature and genetics of benign skin lesions dicussed with patient.  Signs and Symptoms of skin cancer discussed with patient.  Patient to follow up with Primary Care provider regarding elevated blood pressure.  Patient encouraged to perform monthly skin exams.  UV precautions reviewed with patient.  Patient to follow up with Primary Care provider regarding elevated blood pressure.  Skin care regimen reviewed with patient:  Eliminate harsh soaps, i.e. Dial, zest, irsih spring; Mild soaps such as Cetaphil or Dove sensitive skin, avoid hot or cold showers, aggressive use of emollients including vanicream, cetaphil or cerave discussed with patient.    Risks of non-melanoma skin cancer discussed with patient   Return to clinic next appt         Again, thank you for allowing me to participate in the care of your patient.        Sincerely,        Javier Wadsworth MD

## 2018-06-18 NOTE — MR AVS SNAPSHOT
After Visit Summary   2018    Dragan Prasad    MRN: [id number]           Patient Information     Date Of Birth          1935        Visit Information        Provider Department      2018 7:45 AM Javier Wadsworth MD [facility]        Today's Diagnoses     Basal cell carcinoma of back    -  1      Care Instructions    Open Wound Care     for ______________        ? No strenuous activity for 48 hours    ? Take Tylenol as needed for discomfort.                                                .         ? Do not drink alcoholic beverages for 48 hours.    ? Keep the pressure bandage in place for 24 hours. If the bandage becomes blood tinged or loose, reinforce it with gauze and tape.        (Refer to the reverse side of this page for management of bleeding).    ? Remove bandage in 24 hours and begin wound care as follows:     1. Clean area with tap water using a Q tip or gauze pad, (shower / bathe normally)  2. Dry wound with Q tip or gauze pad  3. Apply Aquaphor, Vaseline, Polysporin or Bacitracin Ointment with a Q tip    Do NOT use Neosporin Ointment *  4. Cover the wound with a band-aid or nonstick gauze pad and paper tape.  5. Repeat wound care once a day until wound is completely healed.    It is an old wives tale that a wound heals better when it is exposed to air and allowed to dry out. The wound will heal faster with a better cosmetic result if it is kept moist with ointment and covered with a bandage.  Do not let the wound dry out.      Supplies Needed:                Qtips or gauze pads                Polysporin or Bacitracin Ointment                Bandaids or nonstick gauze pads and paper tape    Wound care kits and brown paper tape are available for purchase at   the pharmacy.    BLEEDIN. Use tightly rolled up gauze or cloth to apply direct pressure over the bandage for 20   minutes.  2. Reapply pressure for an additional 20 minutes if  necessary  3. Call the office or go to the nearest emergency room if pressure fails to stop the bleeding.  4. Use additional gauze and tape to maintain pressure once the bleeding has stopped.  5. Begin wound care 24 hours after surgery as directed.                  WOUND HEALING    1. One week after surgery a pink / red halo will form around the outside of the wound.   This is new skin.  2. The center of the wound will appear yellowish white and produce some drainage.  3. The pink halo will slowly migrate in toward the center of the wound until the wound is covered with new shiny pink skin.  4. There will be no more drainage when the wound is completely healed.  5. It will take six months to one year for the redness to fade.  6. The scar may be itchy, tight and sensitive to extreme temperatures for a year after the surgery.  7. Massaging the area several times a day for several minutes after the wound is completely healed will help the scar soften and normalize faster. Begin massage only after healing is complete.      In case of emergency call: Dr Wadsworth: [phone]     [facility]: [phone]    [facility]: [phone]      WOUND CARE INSTRUCTIONS  for  ONE WEEK AFTER SURGERY    forehead      1) Leave flat bandage on your skin for one week after today s bandage change.  2) In one week when you remove the bandage, you may resume your regular skin care routine, including washing with mild soap and water, applying moisturizer, make-up and sunscreen.    3) If there are any open or bleeding areas at the incision/graft site you should begin to cover the area with a bandage daily as follows:    1) Clean and dry the area with plain tap water using a Q-tip or sterile gauze pad.  2) Apply Polysporin or Bacitracin ointment to the open area.  3) Cover the wound with a band-aid or a sterile non-stick gauze pad and micropore paper tape.         SIGNS OF INFECTION  - If you notice any of these signs of  infection, call your doctor right away: expanding redness around the wound.  - Yellow or greenish-colored pus or cloudy wound drainage.    - Red streaking spreading from the wound.  - Increased swelling, tenderness, or pain around the wound.   - Fever.    Please remember that yellow and clear drainage from a wound can be normal and related to normal wound healing.  Isolated drainage from a wound without a combination of the above features does not indicate infection.       *Once the bandages are removed, the scar will be red and firm (especially in the lip/chin area). This is normal and will fade in time. It might take 6-12 months for this to happen.     *Massaging the area will help the scar soften and fade quicker. Begin to massage the area one month after the bandages have been removed. To massage apply pressure directly and firmly over the scar with the fingertips and move in a circular motion. Massage the area for a few minutes several times a day. Continue to massage the site for several months.    *Approximately 6-8 weeks after surgery it is not uncommon to see the formation of  tender pimple-like  bump along the scar. This is normal. As the scar continues to mature and the stitches underneath the skin begin to dissolve, this might occur. Do not pick or squeeze, this will resolve on it s own. Should one break open producing a small amount of drainage, apply Polysporin or Bacitracin ointment a few times a day until the wound is completely healed.    *Numbness in the surgical area is expected. It might take 12-18 months for the feeling to return to normal. During this time sensations of itchiness, tingling and occasional sharp pains might be noted. These feelings are normal and will subside once the nerves have completely healed.         IN CASE OF EMERGENCY: Dr Wadsworth [phone]     If you were seen in [place] call: [phone]    If you were seen in [place] call: [phone]    WOUND CARE  INSTRUCTIONS   FOR CRYOSURGERY   This area treated with liquid nitrogen will form a blister. You do not need to bandage the area until after the blister forms and breaks (which may be a few days). When the blister breaks, begin daily dressing changes as follows:   1) Clean and dry the area with tap water using clean Q-tip or sterile gauze pad.   2) Apply Polysporin ointment or Bacitracin ointment over entire wound. Do NOT use Neosporin ointment.   3) Cover the wound with a band-aid or sterile non-stick gauze pad and micropore paper tape.   REPEAT THESE INSTRUCTIONS AT LEAST ONCE A DAY UNTIL THE WOUND HAS COMPLETELY HEALED.   It is an old wives tale that a wound heals better when it is exposed to air and allowed to dry out. The wound will heal faster with a better cosmetic result if it is kept moist with ointment and covered with a bandage.   Do not let the wound dry out.   IMPORTANT INFORMATION ON REVERSE SIDE   Supplies Needed:   *Cotton tipped applicators (Q-tips)   *Polysporin ointment or Bacitracin ointment (NOT NEOSPORIN)   *Band-aids, or non stick gauze pads and micropore paper tape   PATIENT INFORMATION   During the healing process you will notice a number of changes. All wounds develop a small halo of redness surrounding the wound. This means healing is occurring. Severe itching with extensive redness usually indicates sensitivity to the ointment or bandage tape used to dress the wound. You should call our office if this develops.   Swelling and/or discoloration around your surgical site is common, particularly when performed around the eye.   All wounds normally drain. The larger the wound the more drainage there will be. After 7-10 days, you will notice the wound beginning to shrink and new skin will begin to grow. The wound is healed when you can see skin has formed over the entire area. A healed wound has a healthy, shiny look to the surface and is red to dark pink in color to normalize. Wounds may take  approximately 4-6 weeks to heal. Larger wounds may take 6-8 weeks. After the wound is healed you may discontinue dressing changes.   You may experience a sensation of tightness as your wound heals. This is normal and will gradually subside.   Your healed wound may be sensitive to temperature changes. This sensitivity improves with time, but if you re having a lot of discomfort, try to avoid temperature extremes.   Patients frequently experience itching after their wound appears to have healed because of the continue healing under the skin. Plain Vaseline will help relieve the itching.               Follow-ups after your visit        Your next 10 appointments already scheduled     Jun 18, 2018  2:45 PM CDT   Nurse Only with [facility] D[facility] Nurse   [facility]    [address] [facility] [address]   [phone]            Jul 24, 2018  8:00 AM CDT   MOHS with Javier Wadsworth MD   [facility]    [address] [address]   [phone]            Aug 07, 2018  8:00 AM CDT   [facility] with Javier Wadsworth MD   [facility]    [address] [address]   [phone]              Who to contact     If you have questions or need follow up information about today's clinic visit or your schedule please contact [facility] directly at [phone].  Normal or non-critical lab and imaging results will be communicated to you by MyChart, letter or phone within 4 business days after the clinic has received the results. If you do not hear from us within 7 days, please contact the clinic through MyChart or phone. If you have a critical or abnormal lab result, we will notify you by phone as soon as possible.  Submit refill requests through [employer] or call your pharmacy and they will forward the refill request to us. Please allow 3 business days  "for your refill to be completed.          Additional Information About Your Visit        [employer] Information     [employer] lets you send messages to your doctor, view your test results, renew your prescriptions, schedule appointments and more. To sign up, go to [email or web address] . Click on \"Log in\" on the left side of the screen, which will take you to the Welcome page. Then click on \"Sign up Now\" on the right side of the page.     You will be asked to enter the access code listed below, as well as some personal information. Please follow the directions to create your username and password.     Your access code is: [email or web address]  Expires: 2018 11:20 AM     Your access code will  in 90 days. If you need help or a new code, please call your [place] clinic or [phone].        Care EveryWhere ID     This is your [facility] EveryWhere ID. This could be used by other organizations to access your [place] medical records  [id number]        Your Vitals Were     Pulse Temperature Pulse Oximetry             68 97.6  F (36.4  C) 97%          Blood Pressure from Last 3 Encounters:   18 157/89   18 (!) 158/97   18 124/86    Weight from Last 3 Encounters:   18 98.9 kg (218 lb)   17 93.7 kg (206 lb 9.6 oz)   16 91.2 kg (201 lb)              We Performed the Following     17642 - EXC MALIG SKIN LESION TRUNK/ARM/LEG 1.1-2.0 CM     CL FROZEN SECTION FIRST SPEC        Primary Care Provider Office Phone # Fax #    Jorge Elizabeth [phone]       [address] [place], [place] [address]        Equal Access to Services     GRAY RODRIGUEZ : Myriam Clark, ren choi, tulio kaalmatao deleon, brandan richard. So [facility] [phone].    ATENCIÓN: Si habla español, tiene a oscar disposición servicios gratuitos de asistencia lingüística. Llame al [phone].    We comply with applicable federal civil rights laws and " [place] laws. We do not discriminate on the basis of race, color, national origin, age, disability, sex, sexual orientation, or gender identity.            Thank you!     Thank you for choosing [facility]  for your care. Our goal is always to provide you with excellent care. Hearing back from our patients is one way we can continue to improve our services. Please take a few minutes to complete the written survey that you may receive in the mail after your visit with us. Thank you!             Your Updated Medication List - Protect others around you: Learn how to safely use, store and throw away your medicines at www.disposemymeds.org.          This list is accurate as of 6/18/18 10:07 AM.  Always use your most recent med list.                   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

## 2018-07-24 ENCOUNTER — OFFICE VISIT (OUTPATIENT)
Dept: DERMATOLOGY | Facility: CLINIC | Age: 83
End: 2018-07-24
Payer: COMMERCIAL

## 2018-07-24 VITALS — OXYGEN SATURATION: 95 % | HEART RATE: 74 BPM | DIASTOLIC BLOOD PRESSURE: 84 MMHG | SYSTOLIC BLOOD PRESSURE: 130 MMHG

## 2018-07-24 DIAGNOSIS — C44.311 BASAL CELL CARCINOMA OF NOSE: Primary | ICD-10-CM

## 2018-07-24 PROCEDURE — 17312 MOHS ADDL STAGE: CPT | Performed by: DERMATOLOGY

## 2018-07-24 PROCEDURE — 17311 MOHS 1 STAGE H/N/HF/G: CPT | Mod: 51 | Performed by: DERMATOLOGY

## 2018-07-24 PROCEDURE — 14061 TIS TRNFR E/N/E/L10.1-30SQCM: CPT | Performed by: DERMATOLOGY

## 2018-07-24 NOTE — NURSING NOTE
"Initial BP (!) 142/92 (BP Location: Left arm, Patient Position: Sitting, Cuff Size: Adult Large)  Pulse 66  SpO2 95% Estimated body mass index is 31.96 kg/(m^2) as calculated from the following:    Height as of 6/5/18: 1.759 m (5' 9.25\").    Weight as of 6/5/18: 98.9 kg (218 lb). .    Initial /84 (BP Location: Left arm, Patient Position: Sitting, Cuff Size: Adult Large)  Pulse 74  SpO2 95% Estimated body mass index is 31.96 kg/(m^2) as calculated from the following:    Height as of 6/5/18: 1.759 m (5' 9.25\").    Weight as of 6/5/18: 98.9 kg (218 lb). .      "

## 2018-07-24 NOTE — PROGRESS NOTES
Dragan Prasad is a 82 year old year old male patient here today for evaluation and managment of basal cell carcinoma on nose. Patient reports the following modifying factors none.  Associated symptoms: none.  Patient has no other skin complaints today.  Remainder of the HPI, Meds, PMH, Allergies, FH, and SH was reviewed in chart.      Past Medical History:   Diagnosis Date     Basal cell carcinoma      Diverticula of colon      Respiratory complications        Past Surgical History:   Procedure Laterality Date     ARTHROSCOPY OF JOINT UNLISTED      right knee about      C APPENDECTOMY       SURGICAL HISTORY OF -   02    Basal cell carcinoma w/positive margins, right  eyebrow & eyelid        Family History   Problem Relation Age of Onset     Cancer Mother      lung cancer     C.A.D. Father      uncertain.  age 79     Unknown/Adopted Maternal Grandmother      Unknown/Adopted Maternal Grandfather      Unknown/Adopted Paternal Grandmother      Unknown/Adopted Paternal Grandfather      Diabetes Brother      Dre     C.A.D. Brother      Dre  age 64       Social History     Social History     Marital status:      Spouse name: N/A     Number of children: N/A     Years of education: N/A     Occupational History     contracter Self     Social History Main Topics     Smoking status: Former Smoker     Types: Cigarettes     Quit date: 1986     Smokeless tobacco: Never Used     Alcohol use No     Drug use: No     Sexual activity: No     Other Topics Concern     Not on file     Social History Narrative       Outpatient Encounter Prescriptions as of 2018   Medication Sig Dispense Refill     ASPIRIN 325 MG PO TBEC ONE DAILY  3     No facility-administered encounter medications on file as of 2018.              Review Of Systems  Skin: As above  Eyes: negative  Ears/Nose/Throat: negative  Respiratory: No shortness of breath, dyspnea on exertion, cough, or hemoptysis  Cardiovascular:  negative  Gastrointestinal: negative  Genitourinary: negative  Musculoskeletal: negative  Neurologic: negative  Psychiatric: negative  Hematologic/Lymphatic/Immunologic: negative  Endocrine: negative      O:   NAD, WDWN, Alert & Oriented, Mood & Affect wnl, Vitals stable   Here today alone   /84 (BP Location: Left arm, Patient Position: Sitting, Cuff Size: Adult Large)  Pulse 74  SpO2 95%   General appearance normal   Vitals stable   Alert, oriented and in no acute distress     Mid nasal tip 6mm pink pearly papule       Eyes: Conjunctivae/lids:Normal     ENT: Lips, buccal mucosa, tongue: normal    MSK:Normal    Cardiovascular: peripheral edema none    Pulm: Breathing Normal    Lymph Nodes: No Head and Neck Lymphadenopathy     Neuro/Psych: Orientation:Normal; Mood/Affect:Normal      A/P:  1. Mid nasal tip basal cell carcinoma   MOHS:   Location    After PGACAC discussed with patient, decision for Mohs surgery was made. Indication for Mohs was Location. Patient confirmed the site with Dr. Wadsworth.  After anesthesia with LEC, the tumor was excised using standard Mohs technique in 2 stages(s).  CLEAR MARGINS OBTAINED and Final defect size was 1.2 cm.       REPAIR SLAF ON NOSE:  Because of size and full thickness nature of the defect, and to maintain form and function of the nose, and the proximity to the nasal tip and ala, a bilateral advancement flap was carefully planned. After anesthesia and prep, Burow's triangles were excised above and below the defect and two laterally based flaps were created by undermining in the subcutaneous plane and skeletonizing the nasal skin.  After hemostasis, the flaps were advanced into the defect with a single tension reduction stitch at the level of the distal nasal bone. The flap edges were then sutured into place in a layered fashion using Vicryl and Fast Absorbing Plain Gut sutures. Postoperative size was 4.1 x 4.4 cm.  EBL minimal; complications none; wound care routine.   The patient was discharged in good condition and will return in one week for wound evaluation.  BENIGN LESIONS DISCUSSED WITH PATIENT:  I discussed the specifics of tumor, prognosis, and genetics of benign lesions.  I explained that treatment of these lesions would be purely cosmetic and not medically neccessary.  I discussed with patient different removal options including excision, cautery and /or laser.      Nature and genetics of benign skin lesions dicussed with patient.  Signs and Symptoms of skin cancer discussed with patient.  ABCDEs of melanoma reviewed with patient.  Patient encouraged to perform monthly skin exams.  UV precautions reviewed with patient.  Patient to follow up with Primary Care provider regarding elevated blood pressure.  Skin care regimen reviewed with patient: Eliminate harsh soaps, i.e. Dial, zest, irsih spring; Mild soaps such as Cetaphil or Dove sensitive skin, avoid hot or cold showers, aggressive use of emollients including vanicream, cetaphil or cerave discussed with patient.    Risks of non-melanoma skin cancer discussed with patient   Return to clinic 6 months  Patient to follow up with Primary Care provider regarding elevated blood pressure.

## 2018-07-24 NOTE — PATIENT INSTRUCTIONS
Sutured Wound Care     [facility]: [phone]    [facility]: [phone]    Nose      ? No strenuous activity for 48 hours. Resume moderate activity in 48 hours. No heavy exercising until you are seen for follow up in one week.     ? Take Tylenol as needed for discomfort.                         ? Do not drink alcoholic beverages for 48 hours.     ? Keep the pressure bandage in place for 24 hours. If the bandage becomes blood tinged or loose, reinforce it with gauze and tape.        (Refer to the reverse side of this page for management of bleeding).    ? Remove pressure bandage in 24 hours     ? Leave the flat bandage in place until your follow up appointment.    ? Keep the bandage dry. Wash around it carefully.    ? If the tape becomes soiled or starts to come off, reinforce it with additional paper tape.    ? Do not smoke for 3 weeks; smoking is detrimental to wound healing.    ? It is normal to have swelling and bruising around the surgical site. The bruising will fade in approximately 10-14 days. Elevate the area to reduce swelling.    ? Numbness, itchiness and sensitivity to temperature changes can occur after surgery and may take up to 18 months to normalize.      POSSIBLE COMPLICATIONS    BLEEDIN. Leave the bandage in place.  2. Use tightly rolled up gauze or a cloth to apply direct pressure over the bandage for 20   minutes.  3. Reapply pressure for an additional 20 minutes if necessary  4. Call the office or go to the nearest emergency room if pressure fails to stop the bleeding.  5. Use additional gauze and tape to maintain pressure once the bleeding has stopped.        PAIN:    1. Post operative pain should slowly get better, never worse.  2. A severe increase in pain may indicate a problem. Call the office if this occurs.    In case of emergency phone:Dr Wadsworth [phone]        WOUND CARE INSTRUCTIONS   FOR CRYOSURGERY  Upper back   This area treated with liquid  nitrogen will form a blister. You do not need to bandage the area until after the blister forms and breaks (which may be a few days). When the blister breaks, begin daily dressing changes as follows:   1) Clean and dry the area with tap water using clean Q-tip or sterile gauze pad.   2) Apply Polysporin ointment or Bacitracin ointment over entire wound. Do NOT use Neosporin ointment.   3) Cover the wound with a band-aid or sterile non-stick gauze pad and micropore paper tape.   REPEAT THESE INSTRUCTIONS AT LEAST ONCE A DAY UNTIL THE WOUND HAS COMPLETELY HEALED.   It is an old wives tale that a wound heals better when it is exposed to air and allowed to dry out. The wound will heal faster with a better cosmetic result if it is kept moist with ointment and covered with a bandage.   Do not let the wound dry out.   IMPORTANT INFORMATION ON REVERSE SIDE   Supplies Needed:   *Cotton tipped applicators (Q-tips)   *Polysporin ointment or Bacitracin ointment (NOT NEOSPORIN)   *Band-aids, or non stick gauze pads and micropore paper tape   PATIENT INFORMATION   During the healing process you will notice a number of changes. All wounds develop a small halo of redness surrounding the wound. This means healing is occurring. Severe itching with extensive redness usually indicates sensitivity to the ointment or bandage tape used to dress the wound. You should call our office if this develops.   Swelling and/or discoloration around your surgical site is common, particularly when performed around the eye.   All wounds normally drain. The larger the wound the more drainage there will be. After 7-10 days, you will notice the wound beginning to shrink and new skin will begin to grow. The wound is healed when you can see skin has formed over the entire area. A healed wound has a healthy, shiny look to the surface and is red to dark pink in color to normalize. Wounds may take approximately 4-6 weeks to heal. Larger wounds may take 6-8  weeks. After the wound is healed you may discontinue dressing changes.   You may experience a sensation of tightness as your wound heals. This is normal and will gradually subside.   Your healed wound may be sensitive to temperature changes. This sensitivity improves with time, but if you re having a lot of discomfort, try to avoid temperature extremes.   Patients frequently experience itching after their wound appears to have healed because of the continue healing under the skin. Plain Vaseline will help relieve the itching.

## 2018-07-24 NOTE — MR AVS SNAPSHOT
After Visit Summary   2018    Dragan Prasad    MRN: [id number]           Patient Information     Date Of Birth          1935        Visit Information        Provider Department      2018 8:00 AM Javier Wadsworth MD [facility]        Care Instructions      Sutured Wound Care     [facility]: [phone]    [facility]: [phone]    Nose      ? No strenuous activity for 48 hours. Resume moderate activity in 48 hours. No heavy exercising until you are seen for follow up in one week.     ? Take Tylenol as needed for discomfort.                         ? Do not drink alcoholic beverages for 48 hours.     ? Keep the pressure bandage in place for 24 hours. If the bandage becomes blood tinged or loose, reinforce it with gauze and tape.        (Refer to the reverse side of this page for management of bleeding).    ? Remove pressure bandage in 24 hours     ? Leave the flat bandage in place until your follow up appointment.    ? Keep the bandage dry. Wash around it carefully.    ? If the tape becomes soiled or starts to come off, reinforce it with additional paper tape.    ? Do not smoke for 3 weeks; smoking is detrimental to wound healing.    ? It is normal to have swelling and bruising around the surgical site. The bruising will fade in approximately 10-14 days. Elevate the area to reduce swelling.    ? Numbness, itchiness and sensitivity to temperature changes can occur after surgery and may take up to 18 months to normalize.      POSSIBLE COMPLICATIONS    BLEEDIN. Leave the bandage in place.  2. Use tightly rolled up gauze or a cloth to apply direct pressure over the bandage for 20   minutes.  3. Reapply pressure for an additional 20 minutes if necessary  4. Call the office or go to the nearest emergency room if pressure fails to stop the bleeding.  5. Use additional gauze and tape to maintain pressure once the bleeding has  stopped.        PAIN:    1. Post operative pain should slowly get better, never worse.  2. A severe increase in pain may indicate a problem. Call the office if this occurs.    In case of emergency phone:Dr Wadsworth [phone]        WOUND CARE INSTRUCTIONS   FOR CRYOSURGERY  Upper back   This area treated with liquid nitrogen will form a blister. You do not need to bandage the area until after the blister forms and breaks (which may be a few days). When the blister breaks, begin daily dressing changes as follows:   1) Clean and dry the area with tap water using clean Q-tip or sterile gauze pad.   2) Apply Polysporin ointment or Bacitracin ointment over entire wound. Do NOT use Neosporin ointment.   3) Cover the wound with a band-aid or sterile non-stick gauze pad and micropore paper tape.   REPEAT THESE INSTRUCTIONS AT LEAST ONCE A DAY UNTIL THE WOUND HAS COMPLETELY HEALED.   It is an old wives tale that a wound heals better when it is exposed to air and allowed to dry out. The wound will heal faster with a better cosmetic result if it is kept moist with ointment and covered with a bandage.   Do not let the wound dry out.   IMPORTANT INFORMATION ON REVERSE SIDE   Supplies Needed:   *Cotton tipped applicators (Q-tips)   *Polysporin ointment or Bacitracin ointment (NOT NEOSPORIN)   *Band-aids, or non stick gauze pads and micropore paper tape   PATIENT INFORMATION   During the healing process you will notice a number of changes. All wounds develop a small halo of redness surrounding the wound. This means healing is occurring. Severe itching with extensive redness usually indicates sensitivity to the ointment or bandage tape used to dress the wound. You should call our office if this develops.   Swelling and/or discoloration around your surgical site is common, particularly when performed around the eye.   All wounds normally drain. The larger the wound the more drainage there will be. After 7-10 days, you will notice  the wound beginning to shrink and new skin will begin to grow. The wound is healed when you can see skin has formed over the entire area. A healed wound has a healthy, shiny look to the surface and is red to dark pink in color to normalize. Wounds may take approximately 4-6 weeks to heal. Larger wounds may take 6-8 weeks. After the wound is healed you may discontinue dressing changes.   You may experience a sensation of tightness as your wound heals. This is normal and will gradually subside.   Your healed wound may be sensitive to temperature changes. This sensitivity improves with time, but if you re having a lot of discomfort, try to avoid temperature extremes.   Patients frequently experience itching after their wound appears to have healed because of the continue healing under the skin. Plain Vaseline will help relieve the itching.                 Follow-ups after your visit        Your next 10 appointments already scheduled     Aug 07, 2018  8:00 AM CDT   MOHS with Javier Wadsworth MD   [facility]    [address] [address] [phone]              Who to contact     If you have questions or need follow up information about today's clinic visit or your schedule please contact [facility] directly at [phone].  Normal or non-critical lab and imaging results will be communicated to you by MyChart, letter or phone within 4 business days after the clinic has received the results. If you do not hear from us within 7 days, please contact the clinic through MyChart or phone. If you have a critical or abnormal lab result, we will notify you by phone as soon as possible.  Submit refill requests through [employer] or call your pharmacy and they will forward the refill request to us. Please allow 3 business days for your refill to be completed.          Additional Information About Your Visit        Care EveryWhere ID     This is your  Care EveryWhere ID. This could be used by other organizations to access your [place] medical records  [id number]        Your Vitals Were     Pulse Pulse Oximetry                74 95%           Blood Pressure from Last 3 Encounters:   07/24/18 130/84   06/18/18 157/89   06/13/18 (!) 158/97    Weight from Last 3 Encounters:   06/05/18 98.9 kg (218 lb)   06/06/17 93.7 kg (206 lb 9.6 oz)   05/24/16 91.2 kg (201 lb)              Today, you had the following     No orders found for display       Primary Care Provider Office Phone # Fax #    Jorge Ladarius Elizabeth [phone]       [address] [place] [id number]        Equal Access to Services     GRACE RODRIGUEZ : Myriam whitley Sogray, waaxda luqadaha, qaybta kaalmada adeegyada, brandan morelos . So [facility] [phone].    ATENCIÓN: Si habla español, tiene a oscar disposición servicios gratuitos de asistencia lingüística. Analyame al [phone].    We comply with applicable federal civil rights laws and [place] laws. We do not discriminate on the basis of race, color, national origin, age, disability, sex, sexual orientation, or gender identity.            Thank you!     Thank you for choosing [facility]  for your care. Our goal is always to provide you with excellent care. Hearing back from our patients is one way we can continue to improve our services. Please take a few minutes to complete the written survey that you may receive in the mail after your visit with us. Thank you!             Your Updated Medication List - Protect others around you: Learn how to safely use, store and throw away your medicines at www.disposemymeds.org.          This list is accurate as of 7/24/18 11:04 AM.  Always use your most recent med list.                   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

## 2018-07-24 NOTE — LETTER
2018         RE: Dragan Prasad  [address] [address] [address]        Dear Colleague,    Thank you for referring your patient, Dragan Prasad, to the [facility]. Please see a copy of my visit note below.    Dragan Prasad is a 82 year old year old male patient here today for evaluation and managment of basal cell carcinoma on nose. Patient reports the following modifying factors none.  Associated symptoms: none.  Patient has no other skin complaints today.  Remainder of the HPI, Meds, PMH, Allergies, FH, and SH was reviewed in chart.      Past Medical History:   Diagnosis Date     Basal cell carcinoma      Diverticula of colon      Respiratory complications        Past Surgical History:   Procedure Laterality Date     ARTHROSCOPY OF JOINT UNLISTED      right knee about      C APPENDECTOMY       SURGICAL HISTORY OF -   02    Basal cell carcinoma w/positive margins, right  eyebrow & eyelid        Family History   Problem Relation Age of Onset     Cancer Mother      lung cancer     C.A.D. Father      uncertain.  age 79     Unknown/Adopted Maternal Grandmother      Unknown/Adopted Maternal Grandfather      Unknown/Adopted Paternal Grandmother      Unknown/Adopted Paternal Grandfather      Diabetes Brother      Dre     C.A.D. Brother      Dre  age 64       Social History     Social History     Marital status:      Spouse name: N/A     Number of children: N/A     Years of education: N/A     Occupational History     contracter Self     Social History Main Topics     Smoking status: Former Smoker     Types: Cigarettes     Quit date: 1986     Smokeless tobacco: Never Used     Alcohol use No     Drug use: No     Sexual activity: No     Other Topics Concern     Not on file     Social History Narrative       Outpatient Encounter Prescriptions as of 2018   Medication Sig Dispense Refill     ASPIRIN 325 MG PO TBEC ONE DAILY  3     No  facility-administered encounter medications on file as of 7/24/2018.              Review Of Systems  Skin: As above  Eyes: negative  Ears/Nose/Throat: negative  Respiratory: No shortness of breath, dyspnea on exertion, cough, or hemoptysis  Cardiovascular: negative  Gastrointestinal: negative  Genitourinary: negative  Musculoskeletal: negative  Neurologic: negative  Psychiatric: negative  Hematologic/Lymphatic/Immunologic: negative  Endocrine: negative      O:   NAD, WDWN, Alert & Oriented, Mood & Affect wnl, Vitals stable   Here today alone   /84 (BP Location: Left arm, Patient Position: Sitting, Cuff Size: Adult Large)  Pulse 74  SpO2 95%   General appearance normal   Vitals stable   Alert, oriented and in no acute distress     Mid nasal tip 6mm pink pearly papule       Eyes: Conjunctivae/lids:Normal     ENT: Lips, buccal mucosa, tongue: normal    MSK:Normal    Cardiovascular: peripheral edema none    Pulm: Breathing Normal    Lymph Nodes: No Head and Neck Lymphadenopathy     Neuro/Psych: Orientation:Normal; Mood/Affect:Normal      A/P:  1. Mid nasal tip basal cell carcinoma   MOHS:   Location    After PGACAC discussed with patient, decision for Mohs surgery was made. Indication for Mohs was Location. Patient confirmed the site with Dr. Wadsworth.  After anesthesia with LEC, the tumor was excised using standard Mohs technique in 2 stages(s).  CLEAR MARGINS OBTAINED and Final defect size was 1.2 cm.       REPAIR SLAF ON NOSE:  Because of size and full thickness nature of the defect, and to maintain form and function of the nose, and the proximity to the nasal tip and ala, a bilateral advancement flap was carefully planned. After anesthesia and prep, Burow's triangles were excised above and below the defect and two laterally based flaps were created by undermining in the subcutaneous plane and skeletonizing the nasal skin.  After hemostasis, the flaps were advanced into the defect with a single tension  reduction stitch at the level of the distal nasal bone. The flap edges were then sutured into place in a layered fashion using Vicryl and Fast Absorbing Plain Gut sutures. Postoperative size was 4.1 x 4.4 cm.  EBL minimal; complications none; wound care routine.  The patient was discharged in good condition and will return in one week for wound evaluation.  BENIGN LESIONS DISCUSSED WITH PATIENT:  I discussed the specifics of tumor, prognosis, and genetics of benign lesions.  I explained that treatment of these lesions would be purely cosmetic and not medically neccessary.  I discussed with patient different removal options including excision, cautery and /or laser.      Nature and genetics of benign skin lesions dicussed with patient.  Signs and Symptoms of skin cancer discussed with patient.  ABCDEs of melanoma reviewed with patient.  Patient encouraged to perform monthly skin exams.  UV precautions reviewed with patient.  Patient to follow up with Primary Care provider regarding elevated blood pressure.  Skin care regimen reviewed with patient: Eliminate harsh soaps, i.e. Dial, zest, irsih spring; Mild soaps such as Cetaphil or Dove sensitive skin, avoid hot or cold showers, aggressive use of emollients including vanicream, cetaphil or cerave discussed with patient.    Risks of non-melanoma skin cancer discussed with patient   Return to clinic 6 months  Patient to follow up with Primary Care provider regarding elevated blood pressure.        Again, thank you for allowing me to participate in the care of your patient.        Sincerely,        Javier Wadsworth MD

## 2018-07-31 ENCOUNTER — ALLIED HEALTH/NURSE VISIT (OUTPATIENT)
Dept: DERMATOLOGY | Facility: CLINIC | Age: 83
End: 2018-07-31
Payer: COMMERCIAL

## 2018-07-31 DIAGNOSIS — Z48.01 ENCOUNTER FOR CHANGE OR REMOVAL OF SURGICAL WOUND DRESSING: Primary | ICD-10-CM

## 2018-07-31 PROCEDURE — 99207 ZZC NO CHARGE NURSE ONLY: CPT

## 2018-07-31 NOTE — MR AVS SNAPSHOT
After Visit Summary   7/31/2018    Dragan Prasad    MRN: [id number]           Patient Information     Date Of Birth          1935        Visit Information        Provider Department      7/31/2018 1:00 PM [facility]        Care Instructions          Wound Care Instructions     FOR SUPERFICIAL WOUNDS     [facility] [phone]    [facility] [phone]  Nose                       AFTER 24 HOURS YOU SHOULD REMOVE THE BANDAGE AND BEGIN DAILY DRESSING CHANGES AS FOLLOWS:     1) Remove Dressing.     2) Clean and dry the area with tap water using a Q-tip or sterile gauze pad.     3) Apply Vaseline, Aquaphor, Polysporin ointment or Bacitracin ointment over entire wound.  Do NOT use Neosporin ointment.     4) Cover the wound with a band-aid, or a sterile non-stick gauze pad and micropore paper tape      REPEAT THESE INSTRUCTIONS AT LEAST ONCE A DAY UNTIL THE WOUND HAS COMPLETELY HEALED.    It is an old wives tale that a wound heals better when it is exposed to air and allowed to dry out. The wound will heal faster with a better cosmetic result if it is kept moist with ointment and covered with a bandage.    **Do not let the wound dry out.**      Supplies Needed:      *Cotton tipped applicators (Q-tips)    *Polysporin Ointment or Bacitracin Ointment (NOT NEOSPORIN)    *Band-aids or non-stick gauze pads and micropore paper tape.      PATIENT INFORMATION:    During the healing process you will notice a number of changes. All wounds develop a small halo of redness surrounding the wound.  This means healing is occurring. Severe itching with extensive redness usually indicates sensitivity to the ointment or bandage tape used to dress the wound.  You should call our office if this develops.      Swelling  and/or discoloration around your surgical site is common, particularly when performed around the eye.    All wounds normally drain.  The larger the  wound the more drainage there will be.  After 7-10 days, you will notice the wound beginning to shrink and new skin will begin to grow.  The wound is healed when you can see skin has formed over the entire area.  A healed wound has a healthy, shiny look to the surface and is red to dark pink in color to normalize.  Wounds may take approximately 4-6 weeks to heal.  Larger wounds may take 6-8 weeks.  After the wound is healed you may discontinue dressing changes.    You may experience a sensation of tightness as your wound heals. This is normal and will gradually subside.    Your healed wound may be sensitive to temperature changes. This sensitivity improves with time, but if you re having a lot of discomfort, try to avoid temperature extremes.    Patients frequently experience itching after their wound appears to have healed because of the continue healing under the skin.  Plain Vaseline will help relieve the itching.        POSSIBLE COMPLICATIONS    BLEEDIN. Leave the bandage in place.  2. Use tightly rolled up gauze or a cloth to apply direct pressure over the bandage for 30  minutes.  3. Reapply pressure for an additional 30 minutes if necessary  4. Use additional gauze and tape to maintain pressure once the bleeding has stopped.              Follow-ups after your visit        Your next 10 appointments already scheduled     Aug 07, 2018  8:00 AM CDT   [facility] with Javier Wadsworth MD   [facility]    [address] [address] [phone]              Who to contact     If you have questions or need follow up information about today's clinic visit or your schedule please contact [facility] directly at [phone].  Normal or non-critical lab and imaging results will be communicated to you by MyChart, letter or phone within 4 business days after the clinic has received the results. If you do not hear from us within 7 days,  please contact the clinic through [employer] or phone. If you have a critical or abnormal lab result, we will notify you by phone as soon as possible.  Submit refill requests through [employer] or call your pharmacy and they will forward the refill request to us. Please allow 3 business days for your refill to be completed.          Additional Information About Your Visit        Care EveryWhere ID     This is your Care EveryWhere ID. This could be used by other organizations to access your [place] medical records  [id number]         Blood Pressure from Last 3 Encounters:   07/24/18 130/84   06/18/18 157/89   06/13/18 (!) 158/97    Weight from Last 3 Encounters:   06/05/18 98.9 kg (218 lb)   06/06/17 93.7 kg (206 lb 9.6 oz)   05/24/16 91.2 kg (201 lb)              Today, you had the following     No orders found for display       Primary Care Provider Office Phone # Fax #    Jorge Elizabeth [phone]       [address] [place] [address]        Equal Access to Services     [facility]: Hadii aad ku hadasho Sogray, waaxda luqadaha, qaybta kaalmada adecésar, brandan morelos . So [facility] [phone].    ATENCIÓN: Si habla español, tiene a oscar disposición servicios gratuitos de asistencia lingüística. [facility] [phone].    We comply with applicable federal civil rights laws and [place] laws. We do not discriminate on the basis of race, color, national origin, age, disability, sex, sexual orientation, or gender identity.            Thank you!     Thank you for choosing [facility]  for your care. Our goal is always to provide you with excellent care. Hearing back from our patients is one way we can continue to improve our services. Please take a few minutes to complete the written survey that you may receive in the mail after your visit with us. Thank you!             Your Updated Medication List - Protect others around you: Learn how to  safely use, store and throw away your medicines at www.disposemymeds.org.          This list is accurate as of 7/31/18  1:17 PM.  Always use your most recent med list.                   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

## 2018-07-31 NOTE — PROGRESS NOTES
Pt returned to clinic for post surgery 1 week follow up bandage change. Pt has no complaints, denies pain. Patient had taken off original bandage on Sunday 7/29/18 and cleaned it with hydrogen peroxide against our instructions given to him last week.Current bandage removed from nose, area cleansed with normal saline. Site is healing, however there are some open areas (x4) where it looks like the sutures were dissolved prematurely due to patient getting it wet and using hydrogen peroxide.   Per Dr. Wadsworth open would care was done to the area and patient is to follow open wound care for the next week. Open wound care instructions given to patient.    Advised to watch for signs/sx of infection; spreading redness, drainage, odor, fever. Call or report promptly to clinic. Pt given written instructions and informed to rtc as needed. Patient verbalized understanding.     Alix Darby LPN

## 2018-07-31 NOTE — PATIENT INSTRUCTIONS
Wound Care Instructions     FOR SUPERFICIAL WOUNDS     [facility] [phone]    [facility] [phone]  Nose                       AFTER 24 HOURS YOU SHOULD REMOVE THE BANDAGE AND BEGIN DAILY DRESSING CHANGES AS FOLLOWS:     1) Remove Dressing.     2) Clean and dry the area with tap water using a Q-tip or sterile gauze pad.     3) Apply Vaseline, Aquaphor, Polysporin ointment or Bacitracin ointment over entire wound.  Do NOT use Neosporin ointment.     4) Cover the wound with a band-aid, or a sterile non-stick gauze pad and micropore paper tape      REPEAT THESE INSTRUCTIONS AT LEAST ONCE A DAY UNTIL THE WOUND HAS COMPLETELY HEALED.    It is an old wives tale that a wound heals better when it is exposed to air and allowed to dry out. The wound will heal faster with a better cosmetic result if it is kept moist with ointment and covered with a bandage.    **Do not let the wound dry out.**      Supplies Needed:      *Cotton tipped applicators (Q-tips)    *Polysporin Ointment or Bacitracin Ointment (NOT NEOSPORIN)    *Band-aids or non-stick gauze pads and micropore paper tape.      PATIENT INFORMATION:    During the healing process you will notice a number of changes. All wounds develop a small halo of redness surrounding the wound.  This means healing is occurring. Severe itching with extensive redness usually indicates sensitivity to the ointment or bandage tape used to dress the wound.  You should call our office if this develops.      Swelling  and/or discoloration around your surgical site is common, particularly when performed around the eye.    All wounds normally drain.  The larger the wound the more drainage there will be.  After 7-10 days, you will notice the wound beginning to shrink and new skin will begin to grow.  The wound is healed when you can see skin has formed over the entire area.  A healed wound has a healthy, shiny look to the surface and is red to dark pink in  color to normalize.  Wounds may take approximately 4-6 weeks to heal.  Larger wounds may take 6-8 weeks.  After the wound is healed you may discontinue dressing changes.    You may experience a sensation of tightness as your wound heals. This is normal and will gradually subside.    Your healed wound may be sensitive to temperature changes. This sensitivity improves with time, but if you re having a lot of discomfort, try to avoid temperature extremes.    Patients frequently experience itching after their wound appears to have healed because of the continue healing under the skin.  Plain Vaseline will help relieve the itching.        POSSIBLE COMPLICATIONS    BLEEDIN. Leave the bandage in place.  2. Use tightly rolled up gauze or a cloth to apply direct pressure over the bandage for 30  minutes.  3. Reapply pressure for an additional 30 minutes if necessary  4. Use additional gauze and tape to maintain pressure once the bleeding has stopped.

## 2018-08-07 ENCOUNTER — OFFICE VISIT (OUTPATIENT)
Dept: DERMATOLOGY | Facility: CLINIC | Age: 83
End: 2018-08-07
Payer: COMMERCIAL

## 2018-08-07 VITALS — HEIGHT: 71 IN | SYSTOLIC BLOOD PRESSURE: 157 MMHG | DIASTOLIC BLOOD PRESSURE: 94 MMHG | HEART RATE: 59 BPM

## 2018-08-07 DIAGNOSIS — C44.311 BASAL CELL CARCINOMA OF RIGHT ALA NASI: Primary | ICD-10-CM

## 2018-08-07 PROCEDURE — 17312 MOHS ADDL STAGE: CPT | Performed by: DERMATOLOGY

## 2018-08-07 PROCEDURE — 11313 SHAVE SKIN LESION >2.0 CM: CPT | Mod: 79 | Performed by: DERMATOLOGY

## 2018-08-07 PROCEDURE — 17311 MOHS 1 STAGE H/N/HF/G: CPT | Mod: 79 | Performed by: DERMATOLOGY

## 2018-08-07 NOTE — NURSING NOTE
Surgical Office Location :   [facility]  [address] [place] [address]

## 2018-08-07 NOTE — PATIENT INSTRUCTIONS
Open Wound Care     for ____NOSE__________        ? No strenuous activity for 48 hours    ? Take Tylenol as needed for discomfort.                                                .         ? Do not drink alcoholic beverages for 48 hours.    ? Keep the pressure bandage in place for 24 hours. If the bandage becomes blood tinged or loose, reinforce it with gauze and tape.        (Refer to the reverse side of this page for management of bleeding).    ? Remove bandage in 24 hours and begin wound care as follows:     1. Clean area with tap water using a Q tip or gauze pad, (shower / bathe normally)  2. Dry wound with Q tip or gauze pad  3. Apply Aquaphor, Vaseline, Polysporin or Bacitracin Ointment with a Q tip    Do NOT use Neosporin Ointment *  4. Cover the wound with a band-aid or nonstick gauze pad and paper tape.  5. Repeat wound care once a day until wound is completely healed.    It is an old wives tale that a wound heals better when it is exposed to air and allowed to dry out. The wound will heal faster with a better cosmetic result if it is kept moist with ointment and covered with a bandage.  Do not let the wound dry out.      Supplies Needed:                Qtips or gauze pads                Polysporin or Bacitracin Ointment                Bandaids or nonstick gauze pads and paper tape    Wound care kits and brown paper tape are available for purchase at   the pharmacy.    BLEEDIN. Use tightly rolled up gauze or cloth to apply direct pressure over the bandage for 20   minutes.  2. Reapply pressure for an additional 20 minutes if necessary  3. Call the office or go to the nearest emergency room if pressure fails to stop the bleeding.  4. Use additional gauze and tape to maintain pressure once the bleeding has stopped.  5. Begin wound care 24 hours after surgery as directed.                  WOUND HEALING    1. One week after surgery a pink / red halo will form around the outside of the wound.   This is new  skin.  2. The center of the wound will appear yellowish white and produce some drainage.  3. The pink halo will slowly migrate in toward the center of the wound until the wound is covered with new shiny pink skin.  4. There will be no more drainage when the wound is completely healed.  5. It will take six months to one year for the redness to fade.  6. The scar may be itchy, tight and sensitive to extreme temperatures for a year after the surgery.  7. Massaging the area several times a day for several minutes after the wound is completely healed will help the scar soften and normalize faster. Begin massage only after healing is complete.      In case of emergency call: Dr Wadsworth: [phone]     [facility]: [phone]    [facility]: [phone]

## 2018-08-07 NOTE — NURSING NOTE
"Initial BP (!) 157/94  Pulse 59  Ht 1.803 m (5' 11\") Estimated body mass index is 31.96 kg/(m^2) as calculated from the following:    Height as of 6/5/18: 1.759 m (5' 9.25\").    Weight as of 6/5/18: 98.9 kg (218 lb). .      "

## 2018-08-07 NOTE — MR AVS SNAPSHOT
After Visit Summary   2018    Drgaan Prasad    MRN: [id number]           Patient Information     Date Of Birth          1935        Visit Information        Provider Department      2018 8:00 AM Javier Wadsworth MD [facility]        Today's Diagnoses     Basal cell carcinoma of right ala nasi    -  1      Care Instructions    Open Wound Care     for ____NOSE__________        ? No strenuous activity for 48 hours    ? Take Tylenol as needed for discomfort.                                                .         ? Do not drink alcoholic beverages for 48 hours.    ? Keep the pressure bandage in place for 24 hours. If the bandage becomes blood tinged or loose, reinforce it with gauze and tape.        (Refer to the reverse side of this page for management of bleeding).    ? Remove bandage in 24 hours and begin wound care as follows:     1. Clean area with tap water using a Q tip or gauze pad, (shower / bathe normally)  2. Dry wound with Q tip or gauze pad  3. Apply Aquaphor, Vaseline, Polysporin or Bacitracin Ointment with a Q tip    Do NOT use Neosporin Ointment *  4. Cover the wound with a band-aid or nonstick gauze pad and paper tape.  5. Repeat wound care once a day until wound is completely healed.    It is an old wives tale that a wound heals better when it is exposed to air and allowed to dry out. The wound will heal faster with a better cosmetic result if it is kept moist with ointment and covered with a bandage.  Do not let the wound dry out.      Supplies Needed:                Qtips or gauze pads                Polysporin or Bacitracin Ointment                Bandaids or nonstick gauze pads and paper tape    Wound care kits and brown paper tape are available for purchase at   the pharmacy.    BLEEDIN. Use tightly rolled up gauze or cloth to apply direct pressure over the bandage for 20   minutes.  2. Reapply pressure for an additional 20 minutes if  necessary  3. Call the office or go to the nearest emergency room if pressure fails to stop the bleeding.  4. Use additional gauze and tape to maintain pressure once the bleeding has stopped.  5. Begin wound care 24 hours after surgery as directed.                  WOUND HEALING    1. One week after surgery a pink / red halo will form around the outside of the wound.   This is new skin.  2. The center of the wound will appear yellowish white and produce some drainage.  3. The pink halo will slowly migrate in toward the center of the wound until the wound is covered with new shiny pink skin.  4. There will be no more drainage when the wound is completely healed.  5. It will take six months to one year for the redness to fade.  6. The scar may be itchy, tight and sensitive to extreme temperatures for a year after the surgery.  7. Massaging the area several times a day for several minutes after the wound is completely healed will help the scar soften and normalize faster. Begin massage only after healing is complete.      In case of emergency call: Dr Wadsworth: [phone]     [facility]: [phone]    [facility]: [phone]            Follow-ups after your visit        Who to contact     If you have questions or need follow up information about today's clinic visit or your schedule please contact [facility] directly at [phone].  Normal or non-critical lab and imaging results will be communicated to you by MyChart, letter or phone within 4 business days after the clinic has received the results. If you do not hear from us within 7 days, please contact the clinic through MyChart or phone. If you have a critical or abnormal lab result, we will notify you by phone as soon as possible.  Submit refill requests through "[employer] or call your pharmacy and they will forward the refill request to us. Please allow 3 business days for your refill to be completed.          Additional  "Information About Your Visit        Care EveryWhere ID     This is your Care EveryWhere ID. This could be used by other organizations to access your [place] medical records  [id number]        Your Vitals Were     Pulse Height                59 1.803 m (5' 11\")           Blood Pressure from Last 3 Encounters:   08/07/18 (!) 157/94   07/24/18 130/84   06/18/18 157/89    Weight from Last 3 Encounters:   06/05/18 98.9 kg (218 lb)   06/06/17 93.7 kg (206 lb 9.6 oz)   05/24/16 91.2 kg (201 lb)              We Performed the Following     MOHS HEAD/NCK/HND/FT/GEN 1ST STAGE UP T0 5 BLOCKS     MOHS HEAD/NCK/HND/FT/GEN EA ADDTL STAGE UP T0 5 BLOCKS     SHAV SKIN LES >21MM FACE,FACIAL        Primary Care Provider Office Phone # Fax #    Jorge Elizabeth [phone]       [address] [place] [address]        Equal Access to Services     [facility] MICHAEL : Hadii aad ku hadasho Soomaali, waaxda luqadaha, qaybta kaalmada adeegyada, waxaly castaneda haymukund morelos . So [facility] [phone].    ATENCIÓN: Si habla español, tiene a oscar disposición servicios gratuitos de asistencia lingüística. Llame al [phone].    We comply with applicable federal civil rights laws and [place] laws. We do not discriminate on the basis of race, color, national origin, age, disability, sex, sexual orientation, or gender identity.            Thank you!     Thank you for choosing [facility]  for your care. Our goal is always to provide you with excellent care. Hearing back from our patients is one way we can continue to improve our services. Please take a few minutes to complete the written survey that you may receive in the mail after your visit with us. Thank you!             Your Updated Medication List - Protect others around you: Learn how to safely use, store and throw away your medicines at www.disposemymeds.org.          This list is accurate as of 8/7/18 10:58 AM.  Always use your " most recent med list.                   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

## 2018-08-07 NOTE — LETTER
2018         RE: Dragan Prasad  [address] [facility] [address]        Dear Colleague,    Thank you for referring your patient, Dragan Prasad, to the [facility]. Please see a copy of my visit note below.    Dragan Prasad is a 82 year old year old male patient here today for evaluation and managment of basal cell carcinoma on right ala.  Patient has no other skin complaints today.  Remainder of the HPI, Meds, PMH, Allergies, FH, and SH was reviewed in chart.      Past Medical History:   Diagnosis Date     Basal cell carcinoma      Diverticula of colon      Respiratory complications        Past Surgical History:   Procedure Laterality Date     ARTHROSCOPY OF JOINT UNLISTED      right knee about      C APPENDECTOMY       SURGICAL HISTORY OF -   02    Basal cell carcinoma w/positive margins, right  eyebrow & eyelid        Family History   Problem Relation Age of Onset     Cancer Mother      lung cancer     C.A.D. Father      uncertain.  age 79     Unknown/Adopted Maternal Grandmother      Unknown/Adopted Maternal Grandfather      Unknown/Adopted Paternal Grandmother      Unknown/Adopted Paternal Grandfather      Diabetes Brother      Dre     C.A.D. Brother      Dre  age 64       Social History     Social History     Marital status:      Spouse name: N/A     Number of children: N/A     Years of education: N/A     Occupational History     contracter Self     Social History Main Topics     Smoking status: Former Smoker     Types: Cigarettes     Quit date: 1986     Smokeless tobacco: Never Used     Alcohol use No     Drug use: No     Sexual activity: No     Other Topics Concern     Not on file     Social History Narrative       Outpatient Encounter Prescriptions as of 2018   Medication Sig Dispense Refill     ASPIRIN 325 MG PO TBEC ONE DAILY  3     No facility-administered encounter medications on file as of 2018.              Review Of  "Systems  Skin: As above  Eyes: negative  Ears/Nose/Throat: negative  Respiratory: No shortness of breath, dyspnea on exertion, cough, or hemoptysis  Cardiovascular: negative  Gastrointestinal: negative  Genitourinary: negative  Musculoskeletal: negative  Neurologic: negative  Psychiatric: negative  Hematologic/Lymphatic/Immunologic: negative  Endocrine: negative      O:   NAD, WDWN, Alert & Oriented, Mood & Affect wnl, Vitals stable   Here today alone   BP (!) 157/94  Pulse 59  Ht 1.803 m (5' 11\")   General appearance normal   Vitals stable   Alert, oriented and in no acute distress     R ala 5mm red scaly papule       Eyes: Conjunctivae/lids:Normal     ENT: Lips, buccal mucosa, tongue: normal    MSK:Normal    Cardiovascular: peripheral edema none    Pulm: Breathing Normal    Neuro/Psych: Orientation:Normal; Mood/Affect:Normal      A/P:  1. R ala basal cell carcinoma   MOHS:   Location    After PGACAC discussed with patient, decision for Mohs surgery was made. Indication for Mohs was Location. Patient confirmed the site with Dr. Wadsworth.  After anesthesia with LEC, the tumor was excised using standard Mohs technique in 2 stages(s).  CLEAR MARGINS OBTAINED and Final defect size was 1.2 cm.     DERMABRASION: After PGACAC discussed with patient, decision for tangential excision and dermabrasion was made. After anesthesia with Lido/Epi/Clinda and prep with hibiclens, hypertrophic areas were tangentially excised and entire cosmetic unit was smoothed 2.2cm. Hemostasis was obtained with pressure. Patient tolerated procedure well. There were no complications and EBL minimal. Patient advised to keep abraded surfaces covered with generous ointment until healed, approximately 2 weeks. Return to office in 3 months or prn.        BENIGN LESIONS DISCUSSED WITH PATIENT:  I discussed the specifics of tumor, prognosis, and genetics of benign lesions.  I explained that treatment of these lesions would be purely cosmetic and not " medically neccessary.  I discussed with patient different removal options including excision, cautery and /or laser.      Nature and genetics of benign skin lesions dicussed with patient.  Signs and Symptoms of skin cancer discussed with patient.  Patient encouraged to perform monthly skin exams.  UV precautions reviewed with patient.  Patient to follow up with Primary Care provider regarding elevated blood pressure.  Skin care regimen reviewed with patient: Eliminate harsh soaps, i.e. Dial, zest, irsih spring; Mild soaps such as Cetaphil or Dove sensitive skin, avoid hot or cold showers, aggressive use of emollients including vanicream, cetaphil or cerave discussed with patient.    Risks of non-melanoma skin cancer discussed with patient   Return to clinic 6 months  Patient to follow up with Primary Care provider regarding elevated blood pressure.        Again, thank you for allowing me to participate in the care of your patient.        Sincerely,        Javier Wadsworth MD

## 2019-05-20 ENCOUNTER — TELEPHONE (OUTPATIENT)
Dept: FAMILY MEDICINE | Facility: CLINIC | Age: 84
End: 2019-05-20

## 2019-05-20 NOTE — TELEPHONE ENCOUNTER
Pt said he usually calls in & Dr. Elizabeth ok's a Aortic Test to see if it has enlarged or not.  Pt is wondering if we can order the test or does he have to be seen by another provider first? Please Advise  Mariluz Rojas Sec

## 2019-05-20 NOTE — TELEPHONE ENCOUNTER
He should be seen by a new provider as a nurse can not order this test and the results need to go to the person that orders it.

## 2019-05-29 ENCOUNTER — OFFICE VISIT (OUTPATIENT)
Dept: FAMILY MEDICINE | Facility: CLINIC | Age: 84
End: 2019-05-29
Payer: MEDICARE

## 2019-05-29 VITALS
WEIGHT: 218 LBS | DIASTOLIC BLOOD PRESSURE: 86 MMHG | TEMPERATURE: 97.3 F | HEIGHT: 71 IN | BODY MASS INDEX: 30.52 KG/M2 | HEART RATE: 72 BPM | RESPIRATION RATE: 16 BRPM | SYSTOLIC BLOOD PRESSURE: 135 MMHG

## 2019-05-29 DIAGNOSIS — L98.9 SKIN LESION: ICD-10-CM

## 2019-05-29 DIAGNOSIS — I71.40 ABDOMINAL AORTIC ANEURYSM (AAA) WITHOUT RUPTURE (H): Primary | ICD-10-CM

## 2019-05-29 PROCEDURE — 99213 OFFICE O/P EST LOW 20 MIN: CPT | Performed by: FAMILY MEDICINE

## 2019-05-29 ASSESSMENT — MIFFLIN-ST. JEOR: SCORE: 1710.97

## 2019-05-29 NOTE — NURSING NOTE
"Chief Complaint   Patient presents with     Establish Care       Initial /86 (BP Location: Right arm, Patient Position: Chair, Cuff Size: Adult Regular)   Pulse 72   Temp 97.3  F (36.3  C) (Tympanic)   Resp 16   Ht 1.803 m (5' 11\")   Wt 98.9 kg (218 lb)   BMI 30.40 kg/m   Estimated body mass index is 30.4 kg/m  as calculated from the following:    Height as of this encounter: 1.803 m (5' 11\").    Weight as of this encounter: 98.9 kg (218 lb).    Patient presents to the clinic using No DME    Health Maintenance that is potentially due pending provider review:  NONE    Paola Schneider MA  11:24 AM 5/29/2019  .      "

## 2019-05-29 NOTE — PROGRESS NOTES
"Subjective     Dragan Prasad is a 83 year old male who presents to clinic today for the following health issues:    HPI     S: Dragan Prasad is a 83 year old male with hx of AAA.  Follows with yearly ultrasound.  Due for recheck.      Problem list, med list, additional histories reviewed and updated, as indicated.      No abd pain.      Has growth on R cheek.  He says it came on over a few weeks.  Multiple basal cells in past    Problem list, med list, additional histories reviewed and updated, as indicated.      O:/86 (BP Location: Right arm, Patient Position: Chair, Cuff Size: Adult Regular)   Pulse 72   Temp 97.3  F (36.3  C) (Tympanic)   Resp 16   Ht 1.803 m (5' 11\")   Wt 98.9 kg (218 lb)   BMI 30.40 kg/m    GEN: Alert and oriented, in no acute distress  Has 7mm raised fleshy rubbery lesion R cheek.  No discoloration or skin breakdown.     Belly soft    A :AAA, recheck      Skin lesion    P: he already has f/u next month with derm.  Lesion could be inflammatory, but he's going to s ee what derm t hings.  Timing doesn't make sense for basal/squam, but see what they say.    AAA ultrasound ordered.  See how it compares to previousl.        "

## 2019-06-05 ENCOUNTER — OFFICE VISIT (OUTPATIENT)
Dept: DERMATOLOGY | Facility: CLINIC | Age: 84
End: 2019-06-05
Payer: MEDICARE

## 2019-06-05 VITALS — DIASTOLIC BLOOD PRESSURE: 102 MMHG | OXYGEN SATURATION: 95 % | HEART RATE: 79 BPM | SYSTOLIC BLOOD PRESSURE: 159 MMHG

## 2019-06-05 DIAGNOSIS — Z85.828 HISTORY OF SKIN CANCER: Primary | ICD-10-CM

## 2019-06-05 DIAGNOSIS — D18.00 ANGIOMA: ICD-10-CM

## 2019-06-05 DIAGNOSIS — C44.41 BASAL CELL CARCINOMA OF SCALP: ICD-10-CM

## 2019-06-05 DIAGNOSIS — L82.1 SEBORRHEIC KERATOSIS: ICD-10-CM

## 2019-06-05 DIAGNOSIS — C44.319 BASAL CELL CARCINOMA (BCC) OF SIDEBURN AREA: ICD-10-CM

## 2019-06-05 DIAGNOSIS — L81.4 LENTIGO: ICD-10-CM

## 2019-06-05 PROCEDURE — 17311 MOHS 1 STAGE H/N/HF/G: CPT | Mod: 59 | Performed by: DERMATOLOGY

## 2019-06-05 PROCEDURE — 88305 TISSUE EXAM BY PATHOLOGIST: CPT | Mod: TC | Performed by: DERMATOLOGY

## 2019-06-05 PROCEDURE — 13132 CMPLX RPR F/C/C/M/N/AX/G/H/F: CPT | Mod: 59 | Performed by: DERMATOLOGY

## 2019-06-05 PROCEDURE — 11106 INCAL BX SKN SINGLE LES: CPT | Mod: 59 | Performed by: DERMATOLOGY

## 2019-06-05 PROCEDURE — 11107 INCAL BX SKN EA SEP/ADDL: CPT | Performed by: DERMATOLOGY

## 2019-06-05 PROCEDURE — 99213 OFFICE O/P EST LOW 20 MIN: CPT | Mod: 25 | Performed by: DERMATOLOGY

## 2019-06-05 PROCEDURE — 17311 MOHS 1 STAGE H/N/HF/G: CPT | Performed by: DERMATOLOGY

## 2019-06-05 PROCEDURE — 88331 PATH CONSLTJ SURG 1 BLK 1SPC: CPT | Mod: 59 | Performed by: DERMATOLOGY

## 2019-06-05 NOTE — PROGRESS NOTES
Dragan Prasad is a 83 year old year old male patient here today for growth on right sideburn and scalp.   .  Patient states this has been present for a couple months.  Patient reports the following symptoms:  growing.  Patient reports the following previous treatments none.  Patient reports the following modifying factors none.  Associated symptoms: none.  Patient has no other skin complaints today.  Remainder of the HPI, Meds, PMH, Allergies, FH, and SH was reviewed in chart.      Past Medical History:   Diagnosis Date     Basal cell carcinoma      Diverticula of colon      Respiratory complications        Past Surgical History:   Procedure Laterality Date     ARTHROSCOPY OF JOINT UNLISTED      right knee about      C APPENDECTOMY       SURGICAL HISTORY OF -   02    Basal cell carcinoma w/positive margins, right  eyebrow & eyelid        Family History   Problem Relation Age of Onset     Cancer Mother         lung cancer     C.A.D. Father         uncertain.  age 79     Unknown/Adopted Maternal Grandmother      Unknown/Adopted Maternal Grandfather      Unknown/Adopted Paternal Grandmother      Unknown/Adopted Paternal Grandfather      Diabetes Brother         Dre     C.A.D. Brother         Dre  age 64       Social History     Socioeconomic History     Marital status:      Spouse name: Not on file     Number of children: Not on file     Years of education: Not on file     Highest education level: Not on file   Occupational History     Occupation: contracter     Employer: SELF   Social Needs     Financial resource strain: Not on file     Food insecurity:     Worry: Not on file     Inability: Not on file     Transportation needs:     Medical: Not on file     Non-medical: Not on file   Tobacco Use     Smoking status: Former Smoker     Types: Cigarettes     Last attempt to quit: 1986     Years since quittin.4     Smokeless tobacco: Never Used   Substance and Sexual Activity      Alcohol use: No     Drug use: No     Sexual activity: Never   Lifestyle     Physical activity:     Days per week: Not on file     Minutes per session: Not on file     Stress: Not on file   Relationships     Social connections:     Talks on phone: Not on file     Gets together: Not on file     Attends Church service: Not on file     Active member of club or organization: Not on file     Attends meetings of clubs or organizations: Not on file     Relationship status: Not on file     Intimate partner violence:     Fear of current or ex partner: Not on file     Emotionally abused: Not on file     Physically abused: Not on file     Forced sexual activity: Not on file   Other Topics Concern     Parent/sibling w/ CABG, MI or angioplasty before 65F 55M? Not Asked   Social History Narrative     Not on file       Outpatient Encounter Medications as of 6/5/2019   Medication Sig Dispense Refill     ASPIRIN 325 MG PO TBEC ONE DAILY  3     No facility-administered encounter medications on file as of 6/5/2019.              Review Of Systems  Skin: As above  Eyes: negative  Ears/Nose/Throat: negative  Respiratory: No shortness of breath, dyspnea on exertion, cough, or hemoptysis  Cardiovascular: negative  Gastrointestinal: negative  Genitourinary: negative  Musculoskeletal: negative  Neurologic: negative  Psychiatric: negative  Hematologic/Lymphatic/Immunologic: negative  Endocrine: negative      O:   NAD, WDWN, Alert & Oriented, Mood & Affect wnl, Vitals stable   Here today alone   There were no vitals taken for this visit.   General appearance normal   Vitals stable   Alert, oriented and in no acute distress      Following lymph nodes palpated: Occipital, Cervical, Supraclavicular no lad     Vertex scalp ulcerated 8mm scaly papule   R sidebunr shiny 1.5cm plaque      Stuck on papules and brown macules on trunk and ext   Red papules on trunk     The remainder of expanded problem focused exam was unremarkable; the following  areas were examined:  scalp/hair, conjunctiva/lids, face, neck, lips, back ab, , chest, digits/nails, RUE, LUE.      Eyes: Conjunctivae/lids:Normal     ENT: Lips, buccal mucosa, tongue: normal    MSK:Normal    Cardiovascular: peripheral edema none    Pulm: Breathing Normal    Lymph Nodes: No Head and Neck Lymphadenopathy     Neuro/Psych: Orientation:Normal; Mood/Affect:Normal      MICRO:     Vertex scalp :There is a proliferation of irregular nests of abnormal squamous cells arising from the epidermis and invading the dermis. These are well differentiated. The dermis shows a variable superficial perivascular inflammatory infiltrate.   R sideburn :There is a proliferation of irregular nests of abnormal squamous cells arising from the epidermis and invading the dermis. These are poorly differentiated. The dermis shows a variable superficial perivascular inflammatory infiltrate.   A/P:  1. Seborrheic keratosis, lentigo, angioma, hx of non-melanoma skin cancer   2. R/o basal cell carcinoma   Incsiional BIOPSY IN HOUSE:  After consent, anesthesia with LEC and prep, incision performed and dx above confirmed with frozen section histology.  No complications and routine wound care.  Patient told result   R sideburn squamous cell carcinoma poorly differntiated send for perm sections as well   Vertex scalp squamous cell carcinoma         BENIGN LESIONS DISCUSSED WITH PATIENT:  I discussed the specifics of tumor, prognosis, and genetics of benign lesions.  I explained that treatment of these lesions would be purely cosmetic and not medically neccessary.  I discussed with patient different removal options including excision, cautery and /or laser.      Nature and genetics of benign skin lesions dicussed with patient.  Signs and Symptoms of skin cancer discussed with patient.  Patient encouraged to perform monthly skin exams.  UV precautions reviewed with patient.  Patient to follow up with Primary Care provider regarding  elevated blood pressure.  Skin care regimen reviewed with patient: Eliminate harsh soaps, i.e. Dial, zest, irsih spring; Mild soaps such as Cetaphil or Dove sensitive skin, avoid hot or cold showers, aggressive use of emollients including vanicream, cetaphil or cerave discussed with patient.    Risks of non-melanoma skin cancer discussed with patient   Return to clinic 6 months    PROCEDURE NOTE  R sideburn basal cell carcinoma   MOHS:   Location    After PGACAC discussed with patient, decision for Mohs surgery was made. Indication for Mohs was Location. Patient confirmed the site with Dr. Wadsworth.  After anesthesia with LEC, the tumor was excised using standard Mohs technique in 1 stages(s).  CLEAR MARGINS OBTAINED and Final defect size was 2.1 x 2.2 cm.       REPAIR COMPLEX: Because of the tightness of the surrounding skin and Because of the size and full thickness nature of the defect, a complex closure was planned. After LEC anesthesia and prep, Burow's triangles were excised in the relaxed skin tension lines. The wound edges were widely undermined by dissection in the subcutaneous plane until adequate tissue mobility was obtained. Hemostasis was obtained. The wound edges were closed in a layered fashion using Vicryl and Fast Absorbing Plain Gut sutures. Postoperative length was 5.3 cm.   EBL minimal; complications none; wound care routine.  The patient was discharged in good condition and will return in one week for wound evaluation.    Vertex scalp basal cell carcinoma   MOHS:   Location    After PGACAC discussed with patient, decision for Mohs surgery was made. Indication for Mohs was Location. Patient confirmed the site with Dr. Wadsworth.  After anesthesia with LEC, the tumor was excised using standard Mohs technique in 1 stages(s).  CLEAR MARGINS OBTAINED and Final defect size was 1.3 cm.       REPAIR SECOND INTENT: We discussed the options for wound management in full with the patient including  risks/benefits/possible outcomes. Decision made to allow the wound to heal by second intention. EBL minimal; complications none; wound care routine.  The patient was discharged in good condition and will return in one month or prn for wound evaluation.

## 2019-06-05 NOTE — NURSING NOTE
Surgical Office Location :   [facility]  [address] [place] [address]

## 2019-06-05 NOTE — PATIENT INSTRUCTIONS
Sutured Wound Care     [facility]: [phone]    [facility]: [phone]    Right cheek      ? No strenuous activity for 48 hours. Resume moderate activity in 48 hours. No heavy exercising until you are seen for follow up in one week.     ? Take Tylenol as needed for discomfort.                         ? Do not drink alcoholic beverages for 48 hours.     ? Keep the pressure bandage in place for 24 hours. If the bandage becomes blood tinged or loose, reinforce it with gauze and tape.        (Refer to the reverse side of this page for management of bleeding).    ? Remove pressure bandage in 24 hours     ? Leave the flat bandage in place until your follow up appointment.    ? Keep the bandage dry. Wash around it carefully.    ? If the tape becomes soiled or starts to come off, reinforce it with additional paper tape.    ? Do not smoke for 3 weeks; smoking is detrimental to wound healing.    ? It is normal to have swelling and bruising around the surgical site. The bruising will fade in approximately 10-14 days. Elevate the area to reduce swelling.    ? Numbness, itchiness and sensitivity to temperature changes can occur after surgery and may take up to 18 months to normalize.      POSSIBLE COMPLICATIONS    BLEEDIN. Leave the bandage in place.  2. Use tightly rolled up gauze or a cloth to apply direct pressure over the bandage for 20   minutes.  3. Reapply pressure for an additional 20 minutes if necessary  4. Call the office or go to the nearest emergency room if pressure fails to stop the bleeding.  5. Use additional gauze and tape to maintain pressure once the bleeding has stopped.        PAIN:    1. Post operative pain should slowly get better, never worse.  2. A severe increase in pain may indicate a problem. Call the office if this occurs.    In case of emergency phone:Dr Wadsworth [phone]    Open Wound Care     for _______scalp_______        ? No strenuous activity for 48  hours    ? Take Tylenol as needed for discomfort.                                                .         ? Do not drink alcoholic beverages for 48 hours.    ? Keep the pressure bandage in place for 24 hours. If the bandage becomes blood tinged or loose, reinforce it with gauze and tape.        (Refer to the reverse side of this page for management of bleeding).    ? Remove bandage in 24 hours and begin wound care as follows:     1. Clean area with tap water using a Q tip or gauze pad, (shower / bathe normally)  2. Dry wound with Q tip or gauze pad  3. Apply Aquaphor, Vaseline, Polysporin or Bacitracin Ointment with a Q tip    Do NOT use Neosporin Ointment *  4. Cover the wound with a band-aid or nonstick gauze pad and paper tape.  5. Repeat wound care once a day until wound is completely healed.    It is an old wives tale that a wound heals better when it is exposed to air and allowed to dry out. The wound will heal faster with a better cosmetic result if it is kept moist with ointment and covered with a bandage.  Do not let the wound dry out.      Supplies Needed:                Qtips or gauze pads                Polysporin or Bacitracin Ointment                Bandaids or nonstick gauze pads and paper tape    Wound care kits and brown paper tape are available for purchase at   the pharmacy.    BLEEDIN. Use tightly rolled up gauze or cloth to apply direct pressure over the bandage for 20   minutes.  2. Reapply pressure for an additional 20 minutes if necessary  3. Call the office or go to the nearest emergency room if pressure fails to stop the bleeding.  4. Use additional gauze and tape to maintain pressure once the bleeding has stopped.  5. Begin wound care 24 hours after surgery as directed.                  WOUND HEALING    1. One week after surgery a pink / red halo will form around the outside of the wound.   This is new skin.  2. The center of the wound will appear yellowish white and produce some  drainage.  3. The pink halo will slowly migrate in toward the center of the wound until the wound is covered with new shiny pink skin.  4. There will be no more drainage when the wound is completely healed.  5. It will take six months to one year for the redness to fade.  6. The scar may be itchy, tight and sensitive to extreme temperatures for a year after the surgery.  7. Massaging the area several times a day for several minutes after the wound is completely healed will help the scar soften and normalize faster. Begin massage only after healing is complete.      In case of emergency call: Dr Wadsworth: [phone]     [facility]: [phone]    [facility]: [phone]

## 2019-06-07 LAB — COPATH REPORT: NORMAL

## 2019-06-12 ENCOUNTER — ALLIED HEALTH/NURSE VISIT (OUTPATIENT)
Dept: DERMATOLOGY | Facility: CLINIC | Age: 84
End: 2019-06-12
Payer: MEDICARE

## 2019-06-12 ENCOUNTER — HOSPITAL ENCOUNTER (EMERGENCY)
Facility: CLINIC | Age: 84
Discharge: HOME | End: 2019-06-12
Attending: NURSE PRACTITIONER | Admitting: NURSE PRACTITIONER
Payer: MEDICARE

## 2019-06-12 ENCOUNTER — APPOINTMENT (OUTPATIENT)
Dept: GENERAL RADIOLOGY | Facility: CLINIC | Age: 84
End: 2019-06-12
Attending: NURSE PRACTITIONER
Payer: MEDICARE

## 2019-06-12 VITALS
TEMPERATURE: 97.9 F | DIASTOLIC BLOOD PRESSURE: 100 MMHG | RESPIRATION RATE: 16 BRPM | SYSTOLIC BLOOD PRESSURE: 157 MMHG | OXYGEN SATURATION: 95 %

## 2019-06-12 DIAGNOSIS — J06.9 VIRAL URI WITH COUGH: ICD-10-CM

## 2019-06-12 DIAGNOSIS — Z48.01 ENCOUNTER FOR CHANGE OR REMOVAL OF SURGICAL WOUND DRESSING: Primary | ICD-10-CM

## 2019-06-12 PROCEDURE — 99213 OFFICE O/P EST LOW 20 MIN: CPT | Mod: Z6 | Performed by: NURSE PRACTITIONER

## 2019-06-12 PROCEDURE — G0463 HOSPITAL OUTPT CLINIC VISIT: HCPCS | Mod: 25 | Performed by: NURSE PRACTITIONER

## 2019-06-12 PROCEDURE — 99207 ZZC NO CHARGE NURSE ONLY: CPT

## 2019-06-12 PROCEDURE — 71046 X-RAY EXAM CHEST 2 VIEWS: CPT

## 2019-06-12 ASSESSMENT — ENCOUNTER SYMPTOMS
SORE THROAT: 0
VOMITING: 0
RHINORRHEA: 1
HEADACHES: 0
ABDOMINAL PAIN: 0
SHORTNESS OF BREATH: 1
FATIGUE: 1
FEVER: 0
CHEST TIGHTNESS: 1
LIGHT-HEADEDNESS: 0
WHEEZING: 0
CHILLS: 1
COUGH: 1

## 2019-06-12 NOTE — DISCHARGE INSTRUCTIONS
Over- the- counter cough medicine and decongestants.   Stay well hydrated.  Return for fevers, increased cough, short of breath or getting worse in any way.

## 2019-06-12 NOTE — PROGRESS NOTES
Pt returned to clinic for post surgery 1 week follow up bandage change. Pt has no complaints, denies pain. Bandage removed from right sideburn, area cleansed with normal saline. Site is healing and wound edges approximating well. Reapplied new steri strips and paper tape.    Advised to watch for signs/sx of infection; spreading redness, drainage, odor, fever. Call or report promptly to clinic. Pt given written instructions and informed to rtc as needed. Patient verbalized understanding.     Alix Darby LPN

## 2019-06-12 NOTE — ED AVS SNAPSHOT
[facility]  [address] [facility] [address]  Phone:  [phone]  Fax:  [phone]                                    Dragan Prasad   MRN: [id number]    Department:  [facility]   Date of Visit:  6/12/2019           After Visit Summary Signature Page    I have received my discharge instructions, and my questions have been answered. I have discussed any challenges I see with this plan with the nurse or doctor.    ..........................................................................................................................................  Patient/Patient Representative Signature      ..........................................................................................................................................  Patient Representative Print Name and Relationship to Patient    ..................................................               ................................................  Date                                   Time    ..........................................................................................................................................  Reviewed by Signature/Title    ...................................................              ..............................................  Date                                               Time          22EPIC Rev 08/18

## 2019-06-12 NOTE — ED PROVIDER NOTES
History     Chief Complaint   Patient presents with     URI     cough and congestionf for one week     HPI  Dragan Prasad is a 83 year old male with history of abdominal aortic (monitored with yearly ultrasound, no change), CAD, and basal cell carcinoma aneurysm who presents to urgent care for 7-8 days of cough, congestion and shortness of breath. Cough is nonproductive (dry). Worse at night. Accompanied by chills, but no objective fevers. Treating symptoms with benadryl and nyquil at night. Recently traveled back from [place] where he lives part of the year. States he had a skin lesion removed on 6/5/19 in the derm clinic and thinks he got a respiratory illness after being at the clinic that day.    Allergies:  No Known Allergies    Problem List:    Patient Active Problem List    Diagnosis Date Noted     Abdominal aortic aneurysm (H) 07/24/2007     Priority: High     5/18:  Abdominal aortic aneurysm measures 4.6 x 5.1 x 6.0 cm,  previously 4.7 x 4.8 x 6.1 cm.        CARDIOVASCULAR SCREENING; LDL GOAL LESS THAN 100 10/31/2010     Priority: Medium     CAD (coronary artery disease) 06/05/2008     Priority: Medium     Benign neoplasm of skin 05/08/2008     Priority: Medium     Problem list name updated by automated process. Provider to review       Abdominal pain, right upper quadrant 07/17/2007     Priority: Medium     Elevated blood pressure reading without diagnosis of hypertension 05/23/2006     Priority: Medium     Family history of other cardiovascular diseases 05/23/2006     Priority: Medium     Problem list name updated by automated process. Provider to review and confirm  Problem list name updated by automated process. Provider to review       Thoracic or lumbosacral neuritis or radiculitis, unspecified 05/23/2006     Priority: Medium        Past Medical History:    Past Medical History:   Diagnosis Date     Basal cell carcinoma      Diverticula of colon      Respiratory complications        Past  Surgical History:    Past Surgical History:   Procedure Laterality Date     ARTHROSCOPY OF JOINT UNLISTED      right knee about      C APPENDECTOMY       SURGICAL HISTORY OF -   02    Basal cell carcinoma w/positive margins, right  eyebrow & eyelid       Family History:    Family History   Problem Relation Age of Onset     Cancer Mother         lung cancer     C.A.D. Father         uncertain.  age 79     Unknown/Adopted Maternal Grandmother      Unknown/Adopted Maternal Grandfather      Unknown/Adopted Paternal Grandmother      Unknown/Adopted Paternal Grandfather      Diabetes Brother         Dre     C.A.D. Brother         Dre  age 64       Social History:  Marital Status:   [2]  Social History     Tobacco Use     Smoking status: Former Smoker     Types: Cigarettes     Last attempt to quit: 1986     Years since quittin.4     Smokeless tobacco: Never Used   Substance Use Topics     Alcohol use: No     Drug use: No        Medications:      ASPIRIN 325 MG PO TBEC         Review of Systems   Constitutional: Positive for chills and fatigue. Negative for fever.   HENT: Positive for congestion and rhinorrhea. Negative for ear pain and sore throat.    Respiratory: Positive for cough, chest tightness and shortness of breath. Negative for wheezing.    Cardiovascular: Negative for chest pain.   Gastrointestinal: Negative for abdominal pain and vomiting.   Neurological: Negative for light-headedness and headaches.       Physical Exam   BP: (!) 157/100  Heart Rate: 80  Temp: 97.9  F (36.6  C)  Resp: 16  SpO2: 95 %      Physical Exam    GENERAL APPEARANCE: healthy, alert and no acute distress  EYES: EOMI, conjunctiva clear  HENT: bilateral ear canals clear, intact, and without inflammation. Right TM normal. Left TM normal. Nasal congestion.  Oropharynx without ulcers, erythema or lesions  NECK: supple, nontender, no lymphadenopathy  RESP: lungs clear to auscultation (?dininished sounds in  the bilateral bases) - no rales, rhonchi or wheezes. No tachypnea. Dry occasional cough during exam.  CV: regular rates and rhythm, normal S1 S2, no murmur noted  SKIN: band-aid over skin lesion that was removed from right face, no surrounding erythema.    ED Course        Procedures         Results for orders placed or performed during the hospital encounter of 06/12/19 (from the past 24 hour(s))   XR Chest 2 Views    Narrative    CHEST TWO VIEWS June 12, 2019 12:23 PM     HISTORY: Cough.    COMPARISON: None.      Impression    IMPRESSION: No acute cardiopulmonary disease.       Medications - No data to display    Assessments & Plan (with Medical Decision Making)   83-year-old male with a 7 to 8 days of cough and congestion.  Accompanied by intermittent chills, but no objective fevers.  He feels more congested and short of breath during the night.  Cough is nonproductive, dry.      On exam patient is alert, oriented, and in no acute distress.  Pleasant.  No tachypnea.  No hypoxia.  Lung sounds are CTA, possibly a bit diminished in the bilateral bases, but no rales or rhonchi.  No wheezing.  Chest x-ray reveals no acute consolidation, or infiltrate.  I have low suspicion for pneumonia.  No tachycardia.  I discussed the imaging results with patient.  I discussed likely a viral course of illness and management with over-the-counter cough medicine and decongestant.  Antibiotics are not indicated.  Patient instructed to return for fevers, or worsening symptoms.      I have reviewed the nursing notes.    I have reviewed the findings, diagnosis, plan and need for follow up with the patient.         Medication List      There are no discharge medications for this visit.         Final diagnoses:   Viral URI with cough       6/12/2019   [facility] EMERGENCY DEPARTMENT     Ashli Esparza APRN CNP  06/12/19 [id number]

## 2019-06-12 NOTE — PATIENT INSTRUCTIONS
WOUND CARE INSTRUCTIONS  for  ONE WEEK AFTER SURGERY  Right sideburn           1) Leave flat bandage on your skin for one week after today s bandage change.  2) In one week when you remove the bandage, you may resume your regular skin care routine, including washing with mild soap and water, applying moisturizer, make-up and sunscreen.    3) If there are any open or bleeding areas at the incision/graft site you should begin to cover the area with a bandage daily as follows:    1) Clean and dry the area with plain tap water using a Q-tip or sterile gauze pad.  2) Apply Polysporin or Bacitracin ointment to the open area.  3) Cover the wound with a band-aid or a sterile non-stick gauze pad and micropore paper tape.         SIGNS OF INFECTION  - If you notice any of these signs of infection, call your doctor right away: expanding redness around the wound.  - Yellow or greenish-colored pus or cloudy wound drainage.    - Red streaking spreading from the wound.  - Increased swelling, tenderness, or pain around the wound.   - Fever.    Please remember that yellow and clear drainage from a wound can be normal and related to normal wound healing.  Isolated drainage from a wound without a combination of the above features does not indicate infection.       *Once the bandages are removed, the scar will be red and firm (especially in the lip/chin area). This is normal and will fade in time. It might take 6-12 months for this to happen.     *Massaging the area will help the scar soften and fade quicker. Begin to massage the area one month after the bandages have been removed. To massage apply pressure directly and firmly over the scar with the fingertips and move in a circular motion. Massage the area for a few minutes several times a day. Continue to massage the site for several months.    *Approximately 6-8 weeks after surgery it is not uncommon to see the formation of  tender pimple-like  bump along the scar. This is normal.  As the scar continues to mature and the stitches underneath the skin begin to dissolve, this might occur. Do not pick or squeeze, this will resolve on it s own. Should one break open producing a small amount of drainage, apply Polysporin or Bacitracin ointment a few times a day until the wound is completely healed.    *Numbness in the surgical area is expected. It might take 12-18 months for the feeling to return to normal. During this time sensations of itchiness, tingling and occasional sharp pains might be noted. These feelings are normal and will subside once the nerves have completely healed.         IN CASE OF EMERGENCY: Dr Wadsworth [phone]     If you were seen in [place] call: [phone]    If you were seen in [place] call: [phone]

## 2019-06-25 ENCOUNTER — HOSPITAL ENCOUNTER (OUTPATIENT)
Dept: ULTRASOUND IMAGING | Facility: CLINIC | Age: 84
Discharge: HOME | End: 2019-06-25
Attending: FAMILY MEDICINE | Admitting: FAMILY MEDICINE
Payer: MEDICARE

## 2019-06-25 DIAGNOSIS — I71.40 ABDOMINAL AORTIC ANEURYSM (AAA) WITHOUT RUPTURE (H): ICD-10-CM

## 2019-06-25 PROCEDURE — 76775 US EXAM ABDO BACK WALL LIM: CPT

## 2019-06-26 ENCOUNTER — TELEPHONE (OUTPATIENT)
Dept: OTHER | Facility: CLINIC | Age: 84
End: 2019-06-26

## 2019-06-26 ENCOUNTER — TELEPHONE (OUTPATIENT)
Dept: FAMILY MEDICINE | Facility: CLINIC | Age: 84
End: 2019-06-26

## 2019-06-26 DIAGNOSIS — I71.40 ABDOMINAL AORTIC ANEURYSM (AAA) WITHOUT RUPTURE (H): Primary | ICD-10-CM

## 2019-06-26 DIAGNOSIS — I71.40 AAA (ABDOMINAL AORTIC ANEURYSM) (H): Primary | ICD-10-CM

## 2019-06-26 NOTE — TELEPHONE ENCOUNTER
S-(situation): called Dragan regarding results from US abdominal aneurysm.  AAA last year measured 4.6 and this report measures 5.1. (gave him the results)    B-(background): history AAA    A-(assessment): increase in size from last US.  Patient expresses hesitancy to see vascular surgery because doesn't want surgery.  I told him needs to go to vascular surgery to discuss options and that this is extremely important.  Will be in [place] in September through the winter months when repeat US due.  Also, reminded needs 6 month follow up US.  He did finally tell me he would follow up with vascular surgery    R-(recommendations): per Dr Bolivar- vascular surgery and recheck in 6 months.   Steph Mcgraw RN

## 2019-06-26 NOTE — TELEPHONE ENCOUNTER
"Pt referred to [facility] by Jorge Bolivar MD for 5.1cm AAA.    6/25/19 AAA US reports:  \"IMPRESSION: The distal abdominal aortic aneurysm measures larger at 5.1 cm compared to 4.6 cm on the prior study.\"    No recent BMP.    Creat 1.03, GFR 69 on 6/6/17.      Pt needs to be scheduled for CTA abdomen/pelvis and consult with Vascular Surgery.  Will route to scheduling to coordinate an appointment within 1-2 weeks.    JOSEPHINE RajputN RN    "

## 2019-07-02 ENCOUNTER — OFFICE VISIT (OUTPATIENT)
Dept: VASCULAR SURGERY | Facility: CLINIC | Age: 84
End: 2019-07-02
Attending: FAMILY MEDICINE
Payer: MEDICARE

## 2019-07-02 ENCOUNTER — HOSPITAL ENCOUNTER (OUTPATIENT)
Dept: CT IMAGING | Facility: CLINIC | Age: 84
Discharge: HOME | End: 2019-07-02
Attending: SURGERY | Admitting: SURGERY
Payer: MEDICARE

## 2019-07-02 VITALS — DIASTOLIC BLOOD PRESSURE: 93 MMHG | HEART RATE: 75 BPM | RESPIRATION RATE: 16 BRPM | SYSTOLIC BLOOD PRESSURE: 152 MMHG

## 2019-07-02 DIAGNOSIS — I71.40 AAA (ABDOMINAL AORTIC ANEURYSM) (H): ICD-10-CM

## 2019-07-02 DIAGNOSIS — I71.40 ABDOMINAL AORTIC ANEURYSM (AAA) WITHOUT RUPTURE (H): ICD-10-CM

## 2019-07-02 LAB
CREAT BLD-MCNC: 1.3 MG/DL (ref 0.66–1.25)
GFR SERPL CREATININE-BSD FRML MDRD: 53 ML/MIN/{1.73_M2}

## 2019-07-02 PROCEDURE — 99203 OFFICE O/P NEW LOW 30 MIN: CPT | Performed by: SURGERY

## 2019-07-02 PROCEDURE — 82565 ASSAY OF CREATININE: CPT

## 2019-07-02 PROCEDURE — 25000128 H RX IP 250 OP 636: Performed by: RADIOLOGY

## 2019-07-02 PROCEDURE — 25000125 ZZHC RX 250: Performed by: RADIOLOGY

## 2019-07-02 PROCEDURE — 74174 CTA ABD&PLVS W/CONTRAST: CPT

## 2019-07-02 RX ORDER — IOPAMIDOL 755 MG/ML
80 INJECTION, SOLUTION INTRAVASCULAR ONCE
Status: COMPLETED | OUTPATIENT
Start: 2019-07-02 | End: 2019-07-02

## 2019-07-02 RX ADMIN — IOPAMIDOL 80 ML: 755 INJECTION, SOLUTION INTRAVENOUS at 09:00

## 2019-07-02 RX ADMIN — SODIUM CHLORIDE 100 ML: 9 INJECTION, SOLUTION INTRAVENOUS at 09:00

## 2019-07-02 NOTE — LETTER
7/2/2019         RE: Dragan Prasad  [address] [id number]        Dear Colleague,    Thank you for referring your patient, Dragan Prasad, to the [facility]. Please see a copy of my visit note below.      Vascular Surgery Consultation Note     Patient:  Dragan Prasad   Date of birth 1935, Medical record number [id number]  Date of Visit:  07/02/2019  Consult Requester:Jorge Bolivar MD            Assessment and Recommendations:   ASSESSMENT / RECOMMENDATION:    Stable 4.8 cm infrarenal AAA.  He will be back in the [place] area in May 2020 at which time we will follow-up with an ultrasound of his aorta.  We discussed that endovascular repair would be something I could offer if his aneurysm ever reaches the threshold of repair which is 5.5 cm.  All questions were answered.  In particular he has no restrictions with regard to exercise or activities.    Many thanks for involving me in the care of this very pleasant patient. Should any questions or concerns arise, please don't hesitate to contact me.    Warm Regards,    Adamaris Gottlieb MD, DFSVS, RPVI  Director, [facility]  Professor and Chief, Vascular and Endovascular Surgery  [facility]  Cell: [phone]  [email or web address]           History of Present Illness:   Mr. Prasad is an 83-year-old male with a long-standing known history of abdominal aortic aneurysm.  He denies abdominal pain or recent new back pain.  He denies stroke, amaurosis or TIA.  Denies claudication.  He travels throughout the country in his RV and tends to spend more time out in [place] particularly [place].  He denies a family history of abdominal aortic aneurysm.  Denies significant lower extremity swelling.  He does check his blood pressure at times and it tends to toward 120 or 130.  He quit smoking many years ago.           Review of Systems:   CONSTITUTIONAL:  No fevers or chills  EYES: negative for  icterus  ENT:  negative for hearing loss, tinnitus and sore throat  RESPIRATORY:  negative for cough with sputum and dyspnea  CARDIOVASCULAR:  negative for chest pain, dyspnea  GASTROINTESTINAL:  negative for nausea, vomiting, diarrhea and constipation  GENITOURINARY:  negative for dysuria  HEME:  No easy bruising  INTEGUMENT:  negative for rash and pruritus  NEURO:  Negative for headache           Past Medical History:     Past Medical History:   Diagnosis Date     Basal cell carcinoma      Diverticula of colon      Respiratory complications             Past Surgical History:     Past Surgical History:   Procedure Laterality Date     ARTHROSCOPY OF JOINT UNLISTED      right knee about      C APPENDECTOMY       SURGICAL HISTORY OF -   02    Basal cell carcinoma w/positive margins, right  eyebrow & eyelid            Family History:     Family History   Problem Relation Age of Onset     Cancer Mother         lung cancer     C.A.D. Father         uncertain.  age 79     Unknown/Adopted Maternal Grandmother      Unknown/Adopted Maternal Grandfather      Unknown/Adopted Paternal Grandmother      Unknown/Adopted Paternal Grandfather      Diabetes Brother         Dre     C.A.D. Brother         Dre  age 64            Social History:     Social History     Tobacco Use     Smoking status: Former Smoker     Types: Cigarettes     Last attempt to quit: 1986     Years since quittin.5     Smokeless tobacco: Never Used   Substance Use Topics     Alcohol use: No     History   Sexual Activity     Sexual activity: Never            Current Medications (antimicrobials listed in bold):            Allergies:   No Known Allergies         Physical Exam:   Vitals were reviewed  Patient Vitals for the past 24 hrs:   BP Pulse Resp   19 0952 (!) 152/93 75 16   19 0948 (!) 162/91 (!) 16 16     Ranges for his vital signs:  Pulse:  [16-75] 75  Resp:  [16] 16  BP: (152-162)/(91-93) 152/93    Physical  Examination:  GENERAL:  well-developed, well-nourished, in no acute distress.   HEENT:  Head is normocephalic, atraumatic   EYES:  Eyes have anicteric sclerae without conjunctival injection or stigmata of endocarditis.    ENT:  Oropharynx is moist without exudates or ulcers. Tongue is midline  NECK:  Supple. No cervical lymphadenopathy. No carotid bruits.  LUNGS:  Clear to auscultation bilateral.   CARDIOVASCULAR:  Regular rate and rhythm with no murmurs, gallops or rubs.  ABDOMEN:  Normal bowel sounds, soft, nontender. No appreciable pulsatile masses.  SKIN:  No acute rashes.    NEUROLOGIC:  Grossly nonfocal. Active x 4 extremities  PULSES: Radial, carotid, femoral, pedal pulses 1+ bilaterally.  His popliteal pulses are normal without a bounding pulse.           Laboratory Data:     Hematology Studies    No lab results found.    Metabolic Studies     Recent Labs   Lab Test 07/02/19  0900 06/06/17  1403   NA  --  140   POTASSIUM  --  4.4   CHLORIDE  --  108   CO2  --  23   BUN  --  17   CR  --  1.03   GFRESTIMATED 53* 69       Imaging Studies  4.7-4.8cm AAA    Total time spent 30 minutes face to face with patient with more than 50% time spent in counseling and coordination of care.    Again, thank you for allowing me to participate in the care of your patient.        Sincerely,        Adamaris Gottlieb MD

## 2019-07-02 NOTE — PROGRESS NOTES
Vascular Surgery Consultation Note     Patient:  Dragan Prasad   Date of birth 1935, Medical record number [id number]  Date of Visit:  07/02/2019  Consult Requester:Jorge Bolivar MD            Assessment and Recommendations:   ASSESSMENT / RECOMMENDATION:    Stable 4.8 cm infrarenal AAA.  He will be back in the [place] area in May 2020 at which time we will follow-up with an ultrasound of his aorta.  We discussed that endovascular repair would be something I could offer if his aneurysm ever reaches the threshold of repair which is 5.5 cm.  All questions were answered.  In particular he has no restrictions with regard to exercise or activities.    Many thanks for involving me in the care of this very pleasant patient. Should any questions or concerns arise, please don't hesitate to contact me.    Warm Regards,    Adamaris Gottlieb MD, DFSVS, RPVI  Director, [facility]  Professor and Chief, Vascular and Endovascular Surgery  [facility]  Cell: [phone]  [email or web address]           History of Present Illness:   Mr. Prasad is an 83-year-old male with a long-standing known history of abdominal aortic aneurysm.  He denies abdominal pain or recent new back pain.  He denies stroke, amaurosis or TIA.  Denies claudication.  He travels throughout the country in his RV and tends to spend more time out in [place] particularly [place].  He denies a family history of abdominal aortic aneurysm.  Denies significant lower extremity swelling.  He does check his blood pressure at times and it tends to toward 120 or 130.  He quit smoking many years ago.           Review of Systems:   CONSTITUTIONAL:  No fevers or chills  EYES: negative for icterus  ENT:  negative for hearing loss, tinnitus and sore throat  RESPIRATORY:  negative for cough with sputum and dyspnea  CARDIOVASCULAR:  negative for chest pain, dyspnea  GASTROINTESTINAL:  negative for nausea, vomiting, diarrhea and  constipation  GENITOURINARY:  negative for dysuria  HEME:  No easy bruising  INTEGUMENT:  negative for rash and pruritus  NEURO:  Negative for headache           Past Medical History:     Past Medical History:   Diagnosis Date     Basal cell carcinoma      Diverticula of colon      Respiratory complications             Past Surgical History:     Past Surgical History:   Procedure Laterality Date     ARTHROSCOPY OF JOINT UNLISTED      right knee about      C APPENDECTOMY       SURGICAL HISTORY OF -   02    Basal cell carcinoma w/positive margins, right  eyebrow & eyelid            Family History:     Family History   Problem Relation Age of Onset     Cancer Mother         lung cancer     C.A.D. Father         uncertain.  age 79     Unknown/Adopted Maternal Grandmother      Unknown/Adopted Maternal Grandfather      Unknown/Adopted Paternal Grandmother      Unknown/Adopted Paternal Grandfather      Diabetes Brother         Dre     C.A.D. Brother         Dre  age 64            Social History:     Social History     Tobacco Use     Smoking status: Former Smoker     Types: Cigarettes     Last attempt to quit: 1986     Years since quittin.5     Smokeless tobacco: Never Used   Substance Use Topics     Alcohol use: No     History   Sexual Activity     Sexual activity: Never            Current Medications (antimicrobials listed in bold):            Allergies:   No Known Allergies         Physical Exam:   Vitals were reviewed  Patient Vitals for the past 24 hrs:   BP Pulse Resp   19 0952 (!) 152/93 75 16   19 0948 (!) 162/91 (!) 16 16     Ranges for his vital signs:  Pulse:  [16-75] 75  Resp:  [16] 16  BP: (152-162)/(91-93) 152/93    Physical Examination:  GENERAL:  well-developed, well-nourished, in no acute distress.   HEENT:  Head is normocephalic, atraumatic   EYES:  Eyes have anicteric sclerae without conjunctival injection or stigmata of endocarditis.    ENT:  Oropharynx is  moist without exudates or ulcers. Tongue is midline  NECK:  Supple. No cervical lymphadenopathy. No carotid bruits.  LUNGS:  Clear to auscultation bilateral.   CARDIOVASCULAR:  Regular rate and rhythm with no murmurs, gallops or rubs.  ABDOMEN:  Normal bowel sounds, soft, nontender. No appreciable pulsatile masses.  SKIN:  No acute rashes.    NEUROLOGIC:  Grossly nonfocal. Active x 4 extremities  PULSES: Radial, carotid, femoral, pedal pulses 1+ bilaterally.  His popliteal pulses are normal without a bounding pulse.           Laboratory Data:     Hematology Studies    No lab results found.    Metabolic Studies     Recent Labs   Lab Test 07/02/19  0900 06/06/17  1403   NA  --  140   POTASSIUM  --  4.4   CHLORIDE  --  108   CO2  --  23   BUN  --  17   CR  --  1.03   GFRESTIMATED 53* 69       Imaging Studies  4.7-4.8cm AAA    Total time spent 30 minutes face to face with patient with more than 50% time spent in counseling and coordination of care.

## 2019-07-02 NOTE — NURSING NOTE
"Initial BP (!) 152/93 (BP Location: Left arm, Patient Position: Chair, Cuff Size: Adult Regular)   Pulse 75   Resp 16  Estimated body mass index is 30.4 kg/m  as calculated from the following:    Height as of 5/29/19: 1.803 m (5' 11\").    Weight as of 5/29/19: 98.9 kg (218 lb). .    Patient is here for a consult for AAA.  ana ayala LPN    "

## 2020-03-03 ENCOUNTER — TELEPHONE (OUTPATIENT)
Dept: VASCULAR SURGERY | Facility: CLINIC | Age: 85
End: 2020-03-03

## 2020-03-03 NOTE — TELEPHONE ENCOUNTER
March 3, 2020    Left message requesting that patient call [facility] to reschedule US & OV with Dr. Gottlieb on 5/5/2020 due to provider's availability having changed.     5/19/2020 is ABR next available appt at [facility].     [facility]  Vascular [facility]    Office: [phone]  Fax: [phone]

## 2020-03-03 NOTE — TELEPHONE ENCOUNTER
March 3, 2020    Patient has been rescheduled for US and OV with Dr. Gottlieb on 5/19/2020 at [facility].     [facility]  [facility]    Office: [phone]  Fax: [phone]

## 2020-05-18 ENCOUNTER — TELEPHONE (OUTPATIENT)
Dept: VASCULAR SURGERY | Facility: CLINIC | Age: 85
End: 2020-05-18

## 2020-05-18 NOTE — TELEPHONE ENCOUNTER
May 18, 2020    Called patient to R/S imaging and f/u appt with Dr. Gottlieb from 5/19/20 to July 2020.     Patient stated that he is having back pain and that he does not want to R/S, rather he wants to have imaging and an appointment with Dr. Gottlieb to assess his symptoms.     Brought this to [facility] manager's attention for direction.     Routing to Dr. Gottlieb's lead RN for review and correspondence.     Katelyn Myers    [facility]  Office: [phone]  Fax [phone]

## 2020-05-18 NOTE — TELEPHONE ENCOUNTER
May 18, 2020    Upon receiving directive from RN, I contacted the patient and informed him that he could still arrive tomorrow for imaging but that his f/u appt with Dr. Gottlieb needs to be cancelled and rescheduled for another day.     Patient at that time requested that I simply cancel both the US and f/u appts and that he would like to speak with an RN regarding this back pain he is experiencing and determine the best way for him to be evaluated.     Katelyn Myers    [facility]  Office: [phone]  Fax [phone]

## 2020-05-21 NOTE — TELEPHONE ENCOUNTER
May 21, 2020    Patient is scheduled for AAA US on 5/28/2020 and RTN VIDEO E-VISIT on 6/2/2020 with Dr. Gottlieb.     Katelyn Myers    [facility]  Office: [phone]  Fax [phone]

## 2020-08-24 ENCOUNTER — OFFICE VISIT (OUTPATIENT)
Dept: DERMATOLOGY | Facility: CLINIC | Age: 85
End: 2020-08-24
Payer: MEDICARE

## 2020-08-24 VITALS — OXYGEN SATURATION: 94 % | HEART RATE: 64 BPM | DIASTOLIC BLOOD PRESSURE: 91 MMHG | SYSTOLIC BLOOD PRESSURE: 161 MMHG

## 2020-08-24 DIAGNOSIS — L57.0 AK (ACTINIC KERATOSIS): ICD-10-CM

## 2020-08-24 DIAGNOSIS — D23.9 DERMAL NEVUS: ICD-10-CM

## 2020-08-24 DIAGNOSIS — L81.4 LENTIGO: ICD-10-CM

## 2020-08-24 DIAGNOSIS — Z85.828 HISTORY OF SKIN CANCER: Primary | ICD-10-CM

## 2020-08-24 DIAGNOSIS — D18.01 ANGIOMA OF SKIN: ICD-10-CM

## 2020-08-24 DIAGNOSIS — L82.1 SEBORRHEIC KERATOSIS: ICD-10-CM

## 2020-08-24 PROCEDURE — 99213 OFFICE O/P EST LOW 20 MIN: CPT | Mod: 25 | Performed by: DERMATOLOGY

## 2020-08-24 PROCEDURE — 17000 DESTRUCT PREMALG LESION: CPT | Performed by: DERMATOLOGY

## 2020-08-24 PROCEDURE — 17003 DESTRUCT PREMALG LES 2-14: CPT | Performed by: DERMATOLOGY

## 2020-08-24 NOTE — PROGRESS NOTES
Dragan Prasad is a 84 year old year old male patient here today for spot on left cheek.   .  Patient states this has been present for a while.  Patient reports the following symptoms:  scale.  Patient reports the following previous treatments none.  These treatments did not work.  Patient reports the following modifying factors none.  Associated symptoms: none.  Patient has no other skin complaints today.  Remainder of the HPI, Meds, PMH, Allergies, FH, and SH was reviewed in chart.      Past Medical History:   Diagnosis Date     Basal cell carcinoma      Diverticula of colon      Respiratory complications        Past Surgical History:   Procedure Laterality Date     ARTHROSCOPY OF JOINT UNLISTED      right knee about      C APPENDECTOMY       SURGICAL HISTORY OF -   02    Basal cell carcinoma w/positive margins, right  eyebrow & eyelid        Family History   Problem Relation Age of Onset     Cancer Mother         lung cancer     C.A.D. Father         uncertain.  age 79     Unknown/Adopted Maternal Grandmother      Unknown/Adopted Maternal Grandfather      Unknown/Adopted Paternal Grandmother      Unknown/Adopted Paternal Grandfather      Diabetes Brother         Dre     C.A.D. Brother         Dre  age 64       Social History     Socioeconomic History     Marital status:      Spouse name: Not on file     Number of children: Not on file     Years of education: Not on file     Highest education level: Not on file   Occupational History     Occupation: contracter     Employer: SELF   Social Needs     Financial resource strain: Not on file     Food insecurity     Worry: Not on file     Inability: Not on file     Transportation needs     Medical: Not on file     Non-medical: Not on file   Tobacco Use     Smoking status: Former Smoker     Types: Cigarettes     Last attempt to quit: 1986     Years since quittin.6     Smokeless tobacco: Never Used   Substance and Sexual Activity      Alcohol use: No     Drug use: No     Sexual activity: Never   Lifestyle     Physical activity     Days per week: Not on file     Minutes per session: Not on file     Stress: Not on file   Relationships     Social connections     Talks on phone: Not on file     Gets together: Not on file     Attends Quaker service: Not on file     Active member of club or organization: Not on file     Attends meetings of clubs or organizations: Not on file     Relationship status: Not on file     Intimate partner violence     Fear of current or ex partner: Not on file     Emotionally abused: Not on file     Physically abused: Not on file     Forced sexual activity: Not on file   Other Topics Concern     Parent/sibling w/ CABG, MI or angioplasty before 65F 55M? Not Asked   Social History Narrative     Not on file       Outpatient Encounter Medications as of 8/24/2020   Medication Sig Dispense Refill     ASPIRIN 325 MG PO TBEC ONE DAILY  3     No facility-administered encounter medications on file as of 8/24/2020.              Review Of Systems  Skin: As above  Eyes: negative  Ears/Nose/Throat: negative  Respiratory: No shortness of breath, dyspnea on exertion, cough, or hemoptysis  Cardiovascular: negative  Gastrointestinal: negative  Genitourinary: negative  Musculoskeletal: negative  Neurologic: negative  Psychiatric: negative  Hematologic/Lymphatic/Immunologic: negative  Endocrine: negative      O:   NAD, WDWN, Alert & Oriented, Mood & Affect wnl, Vitals stable   Here today alone   BP (!) 161/91   Pulse 64   SpO2 94%    General appearance normal   Vitals stable   Alert, oriented and in no acute distress      Following lymph nodes palpated: Occipital, Cervical, Supraclavicular no lad   L temple gritty papuule   L sideburn two gritty papules      Stuck on papules and brown macules on trunk and ext   Red papules on trunk  Flesh colored papules on trunk     The remainder of expanded problem focused exam was normal; the following  areas were examined:  scalp/hair, conjunctiva/lids, face, neck, lips, back, ab , chest, digits/nails, RUE, LUE.      Eyes: Conjunctivae/lids:Normal     ENT: Lips, buccal mucosa, tongue: normal    MSK:Normal    Cardiovascular: peripheral edema none    Pulm: Breathing Normal    Lymph Nodes: No Head and Neck Lymphadenopathy     Neuro/Psych: Orientation:Alert and Orientedx3 ; Mood/Affect:normal       A/P:  1. Seborrheic keratosis, lentigo, angioma, dermal nevus, hxof non-melanoma skin cancer   2. Actinic keratosis x3  LN2:  Treated with LN2 for 5s for 1-2 cycles. Warned risks of blistering, pain, pigment change, scarring, and incomplete resolution.  Advised patient to return if lesions do not completely resolve.  Wound care sheet given.    BENIGN LESIONS DISCUSSED WITH PATIENT:  I discussed the specifics of tumor, prognosis, and genetics of benign lesions.  I explained that treatment of these lesions would be purely cosmetic and not medically neccessary.  I discussed with patient different removal options including excision, cautery and /or laser.      Nature and genetics of benign skin lesions dicussed with patient.  Signs and Symptoms of skin cancer discussed with patient.  Patient encouraged to perform monthly skin exams.  UV precautions reviewed with patient.  Skin care regimen reviewed with patient: Eliminate harsh soaps, i.e. Dial, zest, irsih spring; Mild soaps such as Cetaphil or Dove sensitive skin, avoid hot or cold showers, aggressive use of emollients including vanicream, cetaphil or cerave discussed with patient.    Risks of non-melanoma skin cancer discussed with patient   Return to clinic 12 months

## 2020-08-24 NOTE — LETTER
2020         RE: Dragan Prasad  [address] [facility] [id number]        Dear Colleague,    Thank you for referring your patient, Dragan Prasad, to the [facility]. Please see a copy of my visit note below.    Dragan Prasad is a 84 year old year old male patient here today for spot on left cheek.   .  Patient states this has been present for a while.  Patient reports the following symptoms:  scale.  Patient reports the following previous treatments none.  These treatments did not work.  Patient reports the following modifying factors none.  Associated symptoms: none.  Patient has no other skin complaints today.  Remainder of the HPI, Meds, PMH, Allergies, FH, and SH was reviewed in chart.      Past Medical History:   Diagnosis Date     Basal cell carcinoma      Diverticula of colon      Respiratory complications        Past Surgical History:   Procedure Laterality Date     ARTHROSCOPY OF JOINT UNLISTED      right knee about      C APPENDECTOMY       SURGICAL HISTORY OF -   02    Basal cell carcinoma w/positive margins, right  eyebrow & eyelid        Family History   Problem Relation Age of Onset     Cancer Mother         lung cancer     C.A.D. Father         uncertain.  age 79     Unknown/Adopted Maternal Grandmother      Unknown/Adopted Maternal Grandfather      Unknown/Adopted Paternal Grandmother      Unknown/Adopted Paternal Grandfather      Diabetes Brother         Dre     C.A.D. Brother         Dre  age 64       Social History     Socioeconomic History     Marital status:      Spouse name: Not on file     Number of children: Not on file     Years of education: Not on file     Highest education level: Not on file   Occupational History     Occupation: contracter     Employer: SELF   Social Needs     Financial resource strain: Not on file     Food insecurity     Worry: Not on file     Inability: Not on file     Transportation needs     Medical:  Not on file     Non-medical: Not on file   Tobacco Use     Smoking status: Former Smoker     Types: Cigarettes     Last attempt to quit: 1986     Years since quittin.6     Smokeless tobacco: Never Used   Substance and Sexual Activity     Alcohol use: No     Drug use: No     Sexual activity: Never   Lifestyle     Physical activity     Days per week: Not on file     Minutes per session: Not on file     Stress: Not on file   Relationships     Social connections     Talks on phone: Not on file     Gets together: Not on file     Attends Confucianist service: Not on file     Active member of club or organization: Not on file     Attends meetings of clubs or organizations: Not on file     Relationship status: Not on file     Intimate partner violence     Fear of current or ex partner: Not on file     Emotionally abused: Not on file     Physically abused: Not on file     Forced sexual activity: Not on file   Other Topics Concern     Parent/sibling w/ CABG, MI or angioplasty before 65F 55M? Not Asked   Social History Narrative     Not on file       Outpatient Encounter Medications as of 2020   Medication Sig Dispense Refill     ASPIRIN 325 MG PO TBEC ONE DAILY  3     No facility-administered encounter medications on file as of 2020.              Review Of Systems  Skin: As above  Eyes: negative  Ears/Nose/Throat: negative  Respiratory: No shortness of breath, dyspnea on exertion, cough, or hemoptysis  Cardiovascular: negative  Gastrointestinal: negative  Genitourinary: negative  Musculoskeletal: negative  Neurologic: negative  Psychiatric: negative  Hematologic/Lymphatic/Immunologic: negative  Endocrine: negative      O:   NAD, WDWN, Alert & Oriented, Mood & Affect wnl, Vitals stable   Here today alone   BP (!) 161/91   Pulse 64   SpO2 94%    General appearance normal   Vitals stable   Alert, oriented and in no acute distress      Following lymph nodes palpated: Occipital, Cervical, Supraclavicular no  lad   L temple gritty papuule   L sideburn two gritty papules      Stuck on papules and brown macules on trunk and ext   Red papules on trunk  Flesh colored papules on trunk     The remainder of expanded problem focused exam was normal; the following areas were examined:  scalp/hair, conjunctiva/lids, face, neck, lips, back, ab , chest, digits/nails, RUE, LUE.      Eyes: Conjunctivae/lids:Normal     ENT: Lips, buccal mucosa, tongue: normal    MSK:Normal    Cardiovascular: peripheral edema none    Pulm: Breathing Normal    Lymph Nodes: No Head and Neck Lymphadenopathy     Neuro/Psych: Orientation:Alert and Orientedx3 ; Mood/Affect:normal       A/P:  1. Seborrheic keratosis, lentigo, angioma, dermal nevus, hxof non-melanoma skin cancer   2. Actinic keratosis x3  LN2:  Treated with LN2 for 5s for 1-2 cycles. Warned risks of blistering, pain, pigment change, scarring, and incomplete resolution.  Advised patient to return if lesions do not completely resolve.  Wound care sheet given.    BENIGN LESIONS DISCUSSED WITH PATIENT:  I discussed the specifics of tumor, prognosis, and genetics of benign lesions.  I explained that treatment of these lesions would be purely cosmetic and not medically neccessary.  I discussed with patient different removal options including excision, cautery and /or laser.      Nature and genetics of benign skin lesions dicussed with patient.  Signs and Symptoms of skin cancer discussed with patient.  Patient encouraged to perform monthly skin exams.  UV precautions reviewed with patient.  Skin care regimen reviewed with patient: Eliminate harsh soaps, i.e. Dial, zest, irsih spring; Mild soaps such as Cetaphil or Dove sensitive skin, avoid hot or cold showers, aggressive use of emollients including vanicream, cetaphil or cerave discussed with patient.    Risks of non-melanoma skin cancer discussed with patient   Return to clinic 12 months      Again, thank you for allowing me to participate in the  care of your patient.        Sincerely,        Javier Wadsworth MD

## 2020-08-24 NOTE — NURSING NOTE
Chief Complaint   Patient presents with     Skin Check       Vitals:    08/24/20 1348   BP: (!) 161/91   Pulse: 64   SpO2: 94%     Wt Readings from Last 1 Encounters:   05/29/19 98.9 kg (218 lb)       Melvi Katz LPN.................8/24/2020

## 2020-09-17 ENCOUNTER — HOSPITAL ENCOUNTER (OUTPATIENT)
Dept: ULTRASOUND IMAGING | Facility: CLINIC | Age: 85
Discharge: HOME | End: 2020-09-17
Attending: SURGERY | Admitting: SURGERY
Payer: MEDICARE

## 2020-09-17 DIAGNOSIS — I71.40 ABDOMINAL AORTIC ANEURYSM (AAA) WITHOUT RUPTURE (H): ICD-10-CM

## 2020-09-17 PROCEDURE — 76775 US EXAM ABDO BACK WALL LIM: CPT

## 2020-10-19 ENCOUNTER — NEW PATIENT (OUTPATIENT)
Dept: URBAN - METROPOLITAN AREA CLINIC 51 | Facility: CLINIC | Age: 85
End: 2020-10-19
Payer: MEDICARE

## 2020-10-19 DIAGNOSIS — H25.13 AGE-RELATED NUCLEAR CATARACT, BILATERAL: ICD-10-CM

## 2020-10-19 DIAGNOSIS — H04.123 TEAR FILM INSUFFICIENCY OF BILATERAL LACRIMAL GLANDS: ICD-10-CM

## 2020-10-19 DIAGNOSIS — D48.1 NEOPLASM OF UNCERTIAN BEHAVIOR OF EYELID: Primary | ICD-10-CM

## 2020-10-19 DIAGNOSIS — H40.053 OCULAR HYPERTENSION, BILATERAL: ICD-10-CM

## 2020-10-19 PROCEDURE — 92134 CPTRZ OPH DX IMG PST SGM RTA: CPT | Performed by: OPTOMETRIST

## 2020-10-19 PROCEDURE — 92004 COMPRE OPH EXAM NEW PT 1/>: CPT | Performed by: OPTOMETRIST

## 2020-10-19 PROCEDURE — 92133 CPTRZD OPH DX IMG PST SGM ON: CPT | Performed by: OPTOMETRIST

## 2020-10-19 ASSESSMENT — INTRAOCULAR PRESSURE
OD: 22
OS: 21

## 2020-10-19 ASSESSMENT — KERATOMETRY
OD: 44.90
OS: 42.32

## 2020-10-19 ASSESSMENT — VISUAL ACUITY
OD: 20/25
OS: 20/30

## 2021-05-18 ENCOUNTER — TELEPHONE (OUTPATIENT)
Dept: VASCULAR SURGERY | Facility: CLINIC | Age: 86
End: 2021-05-18

## 2021-05-18 DIAGNOSIS — I71.40 ABDOMINAL AORTIC ANEURYSM (AAA) WITHOUT RUPTURE (H): Primary | ICD-10-CM

## 2021-05-18 NOTE — TELEPHONE ENCOUNTER
Called pt and let him know we would be happy to see him in [place]. Will have  reach out to him and schedule surveillance and follow up with Dr Angeles.

## 2021-05-25 ENCOUNTER — OFFICE VISIT (OUTPATIENT)
Dept: VASCULAR SURGERY | Facility: CLINIC | Age: 86
End: 2021-05-25
Attending: SURGERY
Payer: MEDICARE

## 2021-05-25 ENCOUNTER — SURGERY - HEALTHEAST (OUTPATIENT)
Dept: VASCULAR SURGERY | Facility: CLINIC | Age: 86
End: 2021-05-25

## 2021-05-25 ENCOUNTER — HOSPITAL ENCOUNTER (OUTPATIENT)
Dept: CT IMAGING | Facility: CLINIC | Age: 86
Discharge: HOME | End: 2021-05-25
Attending: SURGERY | Admitting: SURGERY
Payer: MEDICARE

## 2021-05-25 ENCOUNTER — AMBULATORY - HEALTHEAST (OUTPATIENT)
Dept: VASCULAR SURGERY | Facility: CLINIC | Age: 86
End: 2021-05-25

## 2021-05-25 VITALS — SYSTOLIC BLOOD PRESSURE: 152 MMHG | HEART RATE: 97 BPM | TEMPERATURE: 96.6 F | DIASTOLIC BLOOD PRESSURE: 96 MMHG

## 2021-05-25 DIAGNOSIS — I71.40 ABDOMINAL AORTIC ANEURYSM (AAA) WITHOUT RUPTURE (H): ICD-10-CM

## 2021-05-25 DIAGNOSIS — Z01.810 ENCOUNTER FOR PRE-OPERATIVE CARDIOVASCULAR CLEARANCE: Primary | ICD-10-CM

## 2021-05-25 LAB
CREAT BLD-MCNC: 1.3 MG/DL (ref 0.66–1.25)
GFR SERPL CREATININE-BSD FRML MDRD: 53 ML/MIN/{1.73_M2}

## 2021-05-25 PROCEDURE — 82565 ASSAY OF CREATININE: CPT

## 2021-05-25 PROCEDURE — 99213 OFFICE O/P EST LOW 20 MIN: CPT | Performed by: SURGERY

## 2021-05-25 PROCEDURE — 250N000011 HC RX IP 250 OP 636: Performed by: RADIOLOGY

## 2021-05-25 PROCEDURE — 250N000009 HC RX 250: Performed by: RADIOLOGY

## 2021-05-25 PROCEDURE — G1004 CDSM NDSC: HCPCS

## 2021-05-25 RX ORDER — IOPAMIDOL 755 MG/ML
80 INJECTION, SOLUTION INTRAVASCULAR ONCE
Status: COMPLETED | OUTPATIENT
Start: 2021-05-25 | End: 2021-05-25

## 2021-05-25 RX ADMIN — IOPAMIDOL 80 ML: 755 INJECTION, SOLUTION INTRAVENOUS at 10:52

## 2021-05-25 RX ADMIN — SODIUM CHLORIDE 100 ML: 9 INJECTION, SOLUTION INTRAVENOUS at 10:52

## 2021-05-25 ASSESSMENT — PAIN SCALES - GENERAL: PAINLEVEL: NO PAIN (0)

## 2021-05-25 NOTE — PROGRESS NOTES
Patient is in clinic today to see MD Stephanie Angeles.      Patient is here for a follow up to discuss AAA.    The patient does not smoke.    Pt is currently taking ASA.    The patient states he/she are NOT wearing compression .    Pts symptoms include none.    Patients condition is stable.    The provider has been notified that the patient has no complaints.

## 2021-05-25 NOTE — NURSING NOTE
"Initial BP (!) 152/96   Pulse 97   Temp 96.6  F (35.9  C)  Estimated body mass index is 30.4 kg/m  as calculated from the following:    Height as of 5/29/19: 1.803 m (5' 11\").    Weight as of 5/29/19: 98.9 kg (218 lb). .    Starr Ruvalcaba MA on 5/25/2021 at 11:27 AM    "

## 2021-05-25 NOTE — PROGRESS NOTES
VASCULAR SURGERY PROGRESS NOTE   VASCULAR SURGEON: Milana Angeles MD, RPVI     LOCATION:  [facility]     Dragan Prasad   Medical Record #:  [id number]  YOB: 1935  Age:  85 year old     Date of Service: 5/25/2021    PRIMARY CARE PROVIDER: Altagracia Patel      Reason for visit: Follow-up    IMPRESSION: 5.5 asymptomatic infrarenal abdominal aortic aneurysm.  Aneurysm has grown from 5.3 to 5.5 cm compared to 2 years ago.  Patient is candidate for endovascular repair based on the size and also anatomical structure.  We will proceed with a stress test and scheduling for endovascular graft sometime next month.  All the risk and benefits of this operation were explained patient agreed to proceed.    RECOMMENDATION/RISKS: Endovascular AAA repair.  We will obtain stress test first.    HPI:  Dragan Prasad is a 85 year old male who was seen today in for follow-up.  Patient had been seen by our partners for the last several years.  Most recent visit was over 2 years ago when aneurysm was measured 5.3 cm.  Today CT scan is done and it shows 5.5 cm AAA.  No evidence of impending rupture.    REVIEW OF SYSTEMS:    A 12 point ROS was reviewed and is negative     PHH:    Past Medical History:   Diagnosis Date     Basal cell carcinoma      Diverticula of colon      Respiratory complications           Past Surgical History:   Procedure Laterality Date     ARTHROSCOPY OF JOINT UNLISTED      right knee about 1996     C APPENDECTOMY       SURGICAL HISTORY OF -   02/13/02    Basal cell carcinoma w/positive margins, right  eyebrow & eyelid       ALLERGIES:  Patient has no known allergies.    MEDS:    Current Outpatient Medications:      ASPIRIN 325 MG PO TBEC, Takes once in awhile, Disp: , Rfl: 3  No current facility-administered medications for this visit.     SOCIAL HABITS:    History   Smoking Status     Former Smoker     Types: Cigarettes     Quit date: 1/1/1986   Smokeless Tobacco     Never  Used     Social History    Substance and Sexual Activity      Alcohol use: No      History   Drug Use No       FAMILY HISTORY:    Family History   Problem Relation Age of Onset     Cancer Mother         lung cancer     C.A.D. Father         uncertain.  age 79     Unknown/Adopted Maternal Grandmother      Unknown/Adopted Maternal Grandfather      Unknown/Adopted Paternal Grandmother      Unknown/Adopted Paternal Grandfather      Diabetes Brother         Dre     C.A.D. Brother         Dre  age 64       PE:  BP (!) 152/96   Pulse 97   Temp 96.6  F (35.9  C)   Wt Readings from Last 1 Encounters:   19 98.9 kg (218 lb)     There is no height or weight on file to calculate BMI.    EXAM:  GENERAL: This is a well-developed 85 year old male who appears his stated age  EYES: Grossly normal.  MOUTH: Buccal mucosa normal   CARDIAC: Normal   CHEST/LUNG: Clear to auscultation bilaterally  GASTROINTESINAL soft nontender nondistended  MUSCULOSKELETAL: Grossly normal and both lower extremities are intact.  HEME/LYMPH: No lymphedema  NEUROLOGIC: Focally intact, Alert and oriented x 3.   PSYCH: appropriate affect  INTEGUMENT: No open lesions or ulcers  Pulse Exam: Palpable pulses in all 4 extremities.  Abdominal exam did show pulsatile mass          DIAGNOSTIC STUDIES:     Images:  Cta Abdomen Pelvis With Contrast    Result Date: 2021  CTA ABDOMEN/PELVIS WITH CONTRAST May 25, 2021 at 1103 hours HISTORY: 85-year-old patient with surveillance evaluation of abdominal aortic aneurysm. COMPARISON: 2019. TECHNIQUE: Multiplanar multiformatted CTA images obtained from the lung bases through the abdomen and pelvis after the uneventful administration of Isovue 370 intravenous contrast given for total of 80 mL. Radiation dose for this scan was reduced using automated exposure control, adjustment of the mA and/or kV according to patient size, or iterative reconstruction technique. Three-D reformatted images created  at a separate workstation. FINDINGS: Subpleural nodularity in the right lower lobe is unchanged. Heart size is normal. The liver, gallbladder, spleen, adrenal glands, and pancreas are unchanged. Granulomatous calcifications throughout the spleen. Left renal hypodensities unchanged either lobulated cyst or parapelvic cysts. No hydronephrosis of either kidney. Both kidneys are normally perfused. No intraperitoneal fluid or air. Prostate gland is enlarged, with impression upon bladder base. The prostate gland measures up to 5.6 x 6.2 cm. The bladder is partially distended and unremarkable. Diverticuli noted throughout the descending and sigmoid portions of the colon, though affecting all portions of the colon. No dilated loops of bowel. No acute osseous abnormality. Intervertebral disc space narrowing in the lower lumbar spine. The junction of the descending thoracic and abdominal aorta at the diaphragmatic hiatus measures 3.3 x 3.3 cm, unchanged. The celiac axis, SMA, bilateral renal, and inferior mesenteric arteries are patent. Mild stenosis at the celiac axis, perhaps related to diaphragmatic slip but unchanged since previous exam. The infrarenal abdominal aorta measures up to 5.5 cm AP by 4.9 cm transverse, previously 5.4 cm AP by 4.6 cm transverse. Large amount of mural thrombus throughout the anterior aspect of the aneurysm. The right common iliac artery measures up to 1.4 cm, unchanged. Both internal iliac, external iliac, and common femoral arteries are patent.     IMPRESSION: 1. Infrarenal abdominal aortic aneurysm measuring up to 5.5 cm AP by 4.9 cm transverse, increased from previous 5.4 cm AP x 4.6 cm transverse. Large amount of intramural thrombus. Anatomy would be suitable for endovascular repair if applicable. 2. 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 3. Mild stenosis of the celiac axis, though perhaps due to extrinsic compression. This is unchanged from  previous exam. 4. Diffuse colonic diverticulosis as well as left renal hypodensities unchanged. Enlargement of the prostate gland unchanged.      I personally reviewed the images and my interpretation is CTA did reveal 5.5 cm AAA.    LABS:      Sodium   Date Value Ref Range Status   06/06/2017 140 133 - 144 mmol/L Final   03/09/2011 141 133 - 144 mmol/L Final   07/20/2007 143 133 - 144 mmol/L Final     Urea Nitrogen   Date Value Ref Range Status   06/06/2017 17 7 - 30 mg/dL Final   03/09/2011 16 7 - 30 mg/dL Final   07/20/2007 15 7 - 30 mg/dL Final     Hemoglobin   Date Value Ref Range Status   03/09/2011 14.5 13.3 - 17.7 g/dL Final   07/20/2007 15.9 13.3 - 17.7 g/dL Final     Platelet Count   Date Value Ref Range Status   03/09/2011 264 150 - 450 10e9/L Final   07/20/2007 317 150 - 450 10e9/L Final       Total time spent 30minutes face to face with patient with more than 50% time spent in counseling and coordination of care.    Milana Angeles MD, MD, [facility]  VASCULAR SURGERY

## 2021-05-25 NOTE — PATIENT INSTRUCTIONS
Your visit to [facility] for your surgery or procedure is coming soon and we look forward to seeing you! This friendly reminder and pre-procedure checklist will help to ensure your procedure/surgery goes smoothly and meets your expectations. At [facility], our goal is to provide you with a great patient experience and to deliver genuine, professional care to every patient.     Please complete all the steps in advance of your visit. If you have any questions about the items listed below, please give our office a call. We can be reached at [phone] or visit our website at [email or web address]  for more information.       Procedure Date : TBD    Procedure Time : TBD  Arrival Time:    Covid Test:    Post Operative Appointment:    Admission Type: Inpatient    Surgeon: MD Milana Angeles    Procedure: Endovascular Repair for Abdominal Aortic Aneurysm    Graft: Medtronic    [] [facility]: Please check in at the welcome [facility]).  [] [facility]: Please check in at [address].  [x] [facility]: Please check in at the Welcome Desk at the Main entrance    Bring this handout with you to the hospital.    This care plan will help you understand your treatment before and after your stay in the hospital. Your care plan may change as your needs change. We welcome your questions during your stay.  Your spiritual beliefs and values are vital to your healing. We invite you and your family to speak with a [place]  at any time.    How should I get ready for the treatment?  ? See your family doctor for an exam within  30 days of your treatment. If you do not have this exam, we must cancel your treatment.  If you have a history of heart disease or recent heart pain, you should also have a cardiologist (heart doctor) check your heart.  Your doctors can fax your results  to:  [] [facility]: [phone]  [] [facility]: [phone]  [x] [facility]: [phone]    [x] If you take blood thinners: SEE SPECIFIC INSTRUCTIONS BELOW       Aspirin- Unless directed otherwise we DO NOT WANT YOU TO STOP THIS    [] If you have kidney problems: We will give you further details on how to prepare.   If you are allergic to X-ray dye (contrast), please tell us. You will need to take special medicine the day before your surgery.  []The day before treatment:    You must have an empty stomach before surgery.   [] The morning of treatment:  ; Do not take diabetes medicine (including insulin) unless your doctor tells you to.  ; If you take medicine for your heart or blood pressure, take it with a sip of water.  ; Bring a list of your medicines and the doses to the hospital.    What happens the day of treatment?  Before your surgery you will have a blood test and a short exam. A nurse will then place an IV tube in your vein. (We will give you fluids and medicine  through this tube.) He or she will also complete your paperwork and answer questions.  A doctor of anesthesia will meet with you.  He or she will discuss the medicine you will receive before surgery.  You will also meet with the surgeon. He or she will describe what will take place and answer your  questions. You will be asked to sign a consent form. Do not sign this form unless you know the risks and benefits of the surgery.  A family member or friend can be with you for part of this time.  What happens during treatment?  We will thread small tubes (catheters and wires) through your blood vessels and into your aneurysm. The catheters carry the grafts that will seal off the aneurysm. X-rays and special dye will help us see the graft while we place it inside the blood vessel.  ; You will lie on an X-ray table. A clean sheet will cover you.  ; You will receive medicine to put  you to sleep (general anesthesia) or to keep you comfortable (sedation and local anesthesia ). Because of the pain medicine, you may not remember what happened during the treatment.  ; We will clean your abdomen and the top of your inner thighs (groin area). Then we will place a thin tube (sheath) into a small cut in one or both groins.  ; We will thread a catheter (tube) through each sheath and into your blood vessels. Dye released from the catheters will make your blood vessels show up better on the X-rays.  ; We will thread wires and smaller tubes through the blood vessels and up to the aneurysm. X-rays will guide us.  ; We place one or more grafts in the blood vessel. We then expand the grafts so they attach to the artery walls. Now the blood flows through the grafts instead of the aneurysm.  ; We then inject dye into the blood vessel. We use X-rays to make sure that the blood is flowing through the grafts correctly.  ; All the tubes are removed. The surgeon then closes the cut in each leg with a few stitches.  Your family and friends can wait in the lobby during this time. Treatment takes about two hours. Plan on four to six hours with prep and recovery.    What happens after treatment?  ; The doctors will speak with your family about how the procedure went.  ; You will likely spend an hour in the recovery area. Your family can visit you when you return to your room.  ; You will have a tube in your bladder. We will remove this early the next morning.  ; You will receive pain medicine through your IV. You can take pills for pain after you are able to eat.  ; Your nurse will check your temperature, blood pressure and pulses (including at your feet) every two hours.  ; You may be sitting up by evening.  ; We will monitor your heart rate overnight.  ; By the next day you may eat a normal diet (often low-cholesterol with no added salt).  ; We may change your bandages the morning after treatment.  ; If your blood  sugar or cholesterol is abnormal, a doctor may discuss this with you.    When will I go home?  Plan on staying in the hospital one to two days. Most people go home the morning after treatment. You may be ready to take care of yourself at home if:  ; Your blood pressure, heart rate and breathing are normal, and your temperature is below 100 F (37.8 C).  ; You can get out of bed on your own and walk without getting dizzy.  ; You can eat and drink normally.  ; You can urinate (pass water) without problem.  ; You can control your pain with pain pills.  ; Your incisions are clean and dry.  ; You know the signs of possible problems and what to do for them.  See  when should I call for help  on page 4.  ; You are able to think clearly.  ; You understand your medicines and follow-up care plan.    How do I care for myself at home?    Caring for your incisions  ; You may shower one to three days after treatment. No baths or swimming until you see your doctor.  ; Keep the incisions clean and dry. Cover them with gauze and tape. Change the bandages every day.  ; You will likely have glue across your incision. This is to protect you from infection. This may peel off after a few days. If Dermabond is not used there will be small dressings covering your incisions. After you get home, you may remove the dressings and shower - allowing the warm soapy water to run over it.     Be sure to dry the sites well, and keep them dry. DO NOT SOAK IN A TUB/POOL/etc. UNTIL ALL SURGICAL SITES ARE HEALED. DO NOT REMOVE THE GLUE UNTIL THE INCISIONS HEAL.  ; You may have bruising, hardening and numbness around your incisions. This will slowly improve over the next few weeks.  ; Rarely, a soft, fluid-filled bulge (seroma) appears near an incision. It may go away on its own, or it may need to be drained in the clinic. Call our office if you have questions.  Diet  ; From now on, you will need to eat a diet that is low in fat and cholesterol. It may be  easier to eat several small meals a day until your appetite returns.  ; Drink 1 to 2 quarts of extra fluid a day for two to three days (unless your doctor tells you not to). Avoid alcohol.    Activity  ; No heavy lifting or hard activity for one week. You may climb stairs. Don t lift anything heavier than 5 pounds for 4 weeks after surgery.  ; No driving until you see your doctor after surgery. Do not drive if you are still taking narcotic pain pills.  ; You may go back to work when you feel ready. Discuss this with your doctor at your follow-up visit.  ; Take a short walk three to four times a day. This is very important. Slowly increase the distance.      When can I take my medicines again?  ; Wait 48 hours after the treatment before taking metformin (Glucophage or Glucovance). We suggest you have blood tests before restarting this medicine. Talk to your doctor.  ; Take all your other medicines, including blood thinners, unless your doctor tells you not to.  ; To relieve pain, take plain Tylenol or the pain medicine your doctor prescribed.  ; You may use stool softeners (Colace or Senokot) as needed.    What follow-up care will I need?  ; Follow-up exams may include CT scans, blood tests and a physical exam. You will need follow-up exams in one to two weeks, one month, six months and one year.  ; If your first follow-up exam has not already been scheduled at the time you go home, call the clinic to schedule this:  [facility]: [phone]  [facility]: [phone]   [facility]: [phone]  ; You may need a yearly follow-up.  ; We will give you a stent graft card. Carry this card with you at all times. If you have a medical emergency, the people who care for you will need to know you have a graft in your blood vessel.  ; If you need an MRI after your treatment, be sure to tell your doctor that you have a stent graft in your blood vessel. Certain types  of grafts--and newly placed grafts--could move during an MRI.  ; You will indefinitely need an antibiotic (germ-killing medicine) before you have invasive procedures. Always call the clinic before you have dental work, surgery or a colonoscopy.    When should I call for help?  Call your doctor if you have:  ; Numbness, coolness or tingling in a groin or leg.  ; Increased tenderness, drainage or redness at the puncture site.  ; A hard lump at the puncture site, larger than a pea size.  ; A fever over 101 F (38.3 C), taken by mouth.  If you have any questions, call your clinic nurse at:    After hours, you may call the clinic:  ? [facility]: [phone] (Vascular Health Center)  ? [facility]- [phone]  ? [facility]:  [phone] (ask for the vascular surgeon on call)         Understanding Endovascular Repair of an Abdominal Aortic Aneurysm (AAA)  Endovascular repair is a type of treatment for an abdominal aortic aneurysm (AAA). An AAA is a bulge in the wall of the large artery below your heart. The large artery is called the aorta. The bulge is caused by a weak section in the artery wall. The bulge is at risk of tearing. During the procedure, the weak section of the aorta is treated to stop it from tearing.  What is the abdominal aorta?  Your arteries are the blood vessels that bring oxygen-rich blood and nutrients to the tissues of your body. The aorta is a large artery that leads from your heart down through the inside of your chest and belly (abdomen). The section that goes through the abdomen is called the abdominal aorta.  The walls of your aorta are normally thick enough to withstand the force of the blood pressure from the heart. But some health problems can damage the walls of the aorta. This can cause a balloon-like bulge in the wall of the aorta called an aneurysm. In some cases, an aneurysm can start to split or even burst.  This can often cause death. An aneurysm may also start to split along the inside of the aorta wall. This is known as aortic dissection. It can also often cause death.  Many factors can damage the walls of your aorta and cause an aortic aneurysm, such as:    High blood pressure    Smoking    Atherosclerosis    Injury    Certain infections, such as untreated syphilis    Your genes- This is inherited and can be passed on to your children.       Why endovascular repair of AAA is done  You may need this procedure if your AAA is bigger than 2 inches (5 cm) or getting larger. You may also need this procedure if your AAA is at risk of splitting or bursting. Endovascular repair uses a much smaller incision than open surgery. Because of this, it has a lower risk for complications. It may help older adults who have a higher risk for complications. It can also help you recover faster.  How endovascular repair of AAA is done  A vascular surgeon and a team of specialized healthcare providers will do the surgery. Your healthcare provider will make a small cut into an artery in your groin. He or she will then put a thin, flexible tube (catheter) into the artery. He or she will gently guide the tube all the way to the aneurysm. The healthcare provider will send a stent graft along the catheter to the aneurysm. The stent graft is a tube made of a thin metal mesh (the stent), covered with a thin polyester fabric (the graft). When the stent graft reaches the aorta, the healthcare provider will open it up and secure it in place. The stent graft will stay in place, so that blood flows through it. It protects that part of the aorta, and keeps the aneurysm from bursting.  Risks of endovascular repair of AAA  Every procedure has some risks. The risks of this procedure include:    Heavy bleeding at the insertion site    Blood that still flows through the aneurysm bulge- due to a graft this may not be well sealed or later moves or comes  loose. This is a lifelong risk and requires yearly monitoring    Kidney injury from the contrast agent used    Infection    Heart attack, stroke, death    Reaction to anesthesia    Reduced blood flow to the legs, intestines, or kidneys    The need to change to open surgery during the procedure  Your risks may vary depending on your age, your overall health, the size of the aneurysm, and where it is located. Talk with your healthcare provider to learn which risks apply to you. Tell him or her about any questions or concerns you have.

## 2021-06-25 NOTE — PROGRESS NOTES
Pt is [place] pt. Will need EVAR with Medtronics graft. Image center notified and [employer]tronic notified regarding disc. Scheduling is pending stress test at [facility].

## 2021-06-30 ENCOUNTER — HOSPITAL ENCOUNTER (OUTPATIENT)
Dept: NUCLEAR MEDICINE | Facility: CLINIC | Age: 86
Setting detail: NUCLEAR MEDICINE
End: 2021-06-30
Attending: SURGERY
Payer: MEDICARE

## 2021-06-30 ENCOUNTER — TELEPHONE (OUTPATIENT)
Dept: VASCULAR SURGERY | Facility: CLINIC | Age: 86
End: 2021-06-30

## 2021-06-30 ENCOUNTER — HOSPITAL ENCOUNTER (OUTPATIENT)
Dept: CARDIOLOGY | Facility: CLINIC | Age: 86
End: 2021-06-30
Attending: SURGERY
Payer: MEDICARE

## 2021-06-30 VITALS — WEIGHT: 218 LBS | HEIGHT: 71 IN | BODY MASS INDEX: 30.52 KG/M2

## 2021-06-30 DIAGNOSIS — I71.40 ABDOMINAL AORTIC ANEURYSM (AAA) WITHOUT RUPTURE (H): Primary | ICD-10-CM

## 2021-06-30 DIAGNOSIS — Z01.810 ENCOUNTER FOR PRE-OPERATIVE CARDIOVASCULAR CLEARANCE: ICD-10-CM

## 2021-06-30 LAB
CV STRESS MAX HR HE: 94
NUC STRESS EJECTION FRACTION: 38 %
RATE PRESSURE PRODUCT: NORMAL
STRESS ECHO BASELINE DIASTOLIC HE: 76
STRESS ECHO BASELINE HR: 58
STRESS ECHO BASELINE SYSTOLIC BP: 166
STRESS ECHO CALCULATED PERCENT HR: 69 %
STRESS ECHO LAST STRESS DIASTOLIC BP: 84
STRESS ECHO LAST STRESS SYSTOLIC BP: 140
STRESS ECHO TARGET HR: 136
STRESS/REST PERFUSION RATIO: 1.22

## 2021-06-30 PROCEDURE — A9502 TC99M TETROFOSMIN: HCPCS | Performed by: SURGERY

## 2021-06-30 PROCEDURE — G1004 CDSM NDSC: HCPCS | Performed by: INTERNAL MEDICINE

## 2021-06-30 PROCEDURE — 250N000011 HC RX IP 250 OP 636: Performed by: SURGERY

## 2021-06-30 PROCEDURE — 93017 CV STRESS TEST TRACING ONLY: CPT

## 2021-06-30 PROCEDURE — 93016 CV STRESS TEST SUPVJ ONLY: CPT | Performed by: FAMILY MEDICINE

## 2021-06-30 PROCEDURE — 93018 CV STRESS TEST I&R ONLY: CPT | Mod: MG | Performed by: INTERNAL MEDICINE

## 2021-06-30 PROCEDURE — 78452 HT MUSCLE IMAGE SPECT MULT: CPT | Mod: 26 | Performed by: INTERNAL MEDICINE

## 2021-06-30 PROCEDURE — 78452 HT MUSCLE IMAGE SPECT MULT: CPT | Mod: MG

## 2021-06-30 PROCEDURE — 343N000001 HC RX 343: Performed by: SURGERY

## 2021-06-30 RX ORDER — REGADENOSON 0.08 MG/ML
0.4 INJECTION, SOLUTION INTRAVENOUS ONCE
Status: COMPLETED | OUTPATIENT
Start: 2021-06-30 | End: 2021-06-30

## 2021-06-30 RX ADMIN — TETROFOSMIN 12.6 MCI.: 1.38 INJECTION, POWDER, LYOPHILIZED, FOR SOLUTION INTRAVENOUS at 08:36

## 2021-06-30 RX ADMIN — REGADENOSON 0.4 MG: 0.08 INJECTION, SOLUTION INTRAVENOUS at 10:56

## 2021-06-30 RX ADMIN — TETROFOSMIN 35 MCI.: 1.38 INJECTION, POWDER, LYOPHILIZED, FOR SOLUTION INTRAVENOUS at 10:58

## 2021-06-30 ASSESSMENT — MIFFLIN-ST. JEOR: SCORE: 1700.97

## 2021-06-30 NOTE — TELEPHONE ENCOUNTER
Dr Angeles reviewed stress test and called pt with results. Consult for Cardiology consult placed in Epic.

## 2021-06-30 NOTE — TELEPHONE ENCOUNTER
----- Message from Milana Angeles MD sent at 6/30/2021  4:22 PM CDT -----  Please lets get stat cardiology consult

## 2021-07-21 ENCOUNTER — OFFICE VISIT (OUTPATIENT)
Dept: DERMATOLOGY | Facility: CLINIC | Age: 86
End: 2021-07-21
Payer: MEDICARE

## 2021-07-21 VITALS — HEART RATE: 61 BPM | DIASTOLIC BLOOD PRESSURE: 94 MMHG | SYSTOLIC BLOOD PRESSURE: 156 MMHG | OXYGEN SATURATION: 95 %

## 2021-07-21 DIAGNOSIS — L57.0 AK (ACTINIC KERATOSIS): ICD-10-CM

## 2021-07-21 DIAGNOSIS — L81.4 LENTIGO: ICD-10-CM

## 2021-07-21 DIAGNOSIS — D18.01 HEMANGIOMA OF SKIN: ICD-10-CM

## 2021-07-21 DIAGNOSIS — L82.1 SEBORRHEIC KERATOSIS: Primary | ICD-10-CM

## 2021-07-21 DIAGNOSIS — Z85.828 HISTORY OF SKIN CANCER: ICD-10-CM

## 2021-07-21 DIAGNOSIS — L82.0 INFLAMED SEBORRHEIC KERATOSIS: ICD-10-CM

## 2021-07-21 PROCEDURE — 99213 OFFICE O/P EST LOW 20 MIN: CPT | Mod: 25 | Performed by: DERMATOLOGY

## 2021-07-21 PROCEDURE — 17110 DESTRUCTION B9 LES UP TO 14: CPT | Performed by: DERMATOLOGY

## 2021-07-21 PROCEDURE — 17003 DESTRUCT PREMALG LES 2-14: CPT | Mod: 59 | Performed by: DERMATOLOGY

## 2021-07-21 PROCEDURE — 17000 DESTRUCT PREMALG LESION: CPT | Mod: 59 | Performed by: DERMATOLOGY

## 2021-07-21 NOTE — PROGRESS NOTES
Dragan Prasad is an extremely pleasant 85 year old year old male patient here today for itching spot son left shoulder.   .   Patient states this has been present for a while.  Patient reports the following symptoms:  itching.  Patient reports the following previous treatments none.  These treatments did not work.  Patient reports the following modifying factors none.  Associated symptoms: none.  Patient has no other skin complaints today.  Remainder of the HPI, Meds, PMH, Allergies, FH, and SH was reviewed in chart.      Past Medical History:   Diagnosis Date     Basal cell carcinoma      Diverticula of colon      Respiratory complications        Past Surgical History:   Procedure Laterality Date     ARTHROSCOPY OF JOINT UNLISTED      right knee about      C APPENDECTOMY       SURGICAL HISTORY OF -   02    Basal cell carcinoma w/positive margins, right  eyebrow & eyelid        Family History   Problem Relation Age of Onset     Cancer Mother         lung cancer     C.A.D. Father         uncertain.  age 79     Unknown/Adopted Maternal Grandmother      Unknown/Adopted Maternal Grandfather      Unknown/Adopted Paternal Grandmother      Unknown/Adopted Paternal Grandfather      Diabetes Brother         Dre     C.A.D. Brother         Dre  age 64       Social History     Socioeconomic History     Marital status:      Spouse name: Not on file     Number of children: Not on file     Years of education: Not on file     Highest education level: Not on file   Occupational History     Occupation: contracter     Employer: SELF   Tobacco Use     Smoking status: Former Smoker     Types: Cigarettes     Quit date: 1986     Years since quittin.5     Smokeless tobacco: Never Used   Substance and Sexual Activity     Alcohol use: No     Drug use: No     Sexual activity: Never   Other Topics Concern     Parent/sibling w/ CABG, MI or angioplasty before 65F 55M? Not Asked   Social History Narrative      Not on file     Social Determinants of Health     Financial Resource Strain:      Difficulty of Paying Living Expenses:    Food Insecurity:      Worried About Running Out of Food in the Last Year:      Ran Out of Food in the Last Year:    Transportation Needs:      Lack of Transportation (Medical):      Lack of Transportation (Non-Medical):    Physical Activity:      Days of Exercise per Week:      Minutes of Exercise per Session:    Stress:      Feeling of Stress :    Social Connections:      Frequency of Communication with Friends and Family:      Frequency of Social Gatherings with Friends and Family:      Attends L[employer] day Services:      Active Member of Clubs or Organizations:      Attends Club or Organization Meetings:      Marital Status:    Intimate Partner Violence:      Fear of Current or Ex-Partner:      Emotionally Abused:      Physically Abused:      Sexually Abused:        Outpatient Encounter Medications as of 7/21/2021   Medication Sig Dispense Refill     ASPIRIN 325 MG PO TBEC Takes once in awhile  3     No facility-administered encounter medications on file as of 7/21/2021.             O:   NAD, WDWN, Alert & Oriented, Mood & Affect wnl, Vitals stable   Here today alone   BP (!) 156/94 (BP Location: Left arm, Patient Position: Sitting, Cuff Size: Adult Large)   Pulse 61   SpO2 95%    General appearance normal   Vitals stable   Alert, oriented and in no acute distress      Following lymph nodes palpated: Occipital, Cervical, Supraclavicular no lad   L shoulder inflamed seborrheic keratosis x2  Gritty papules on face     Stuck on papules and brown macules on trunk and ext   Red papules on trunk  Flesh colored papules on trunk     The remainder of expanded problem focused exam was normal; the following areas were examined:  scalp/hair, conjunctiva/lids, face, neck, lips, back, ab , chest, digits/nails, RUE, LUE.      Eyes: Conjunctivae/lids:Normal     ENT: Lips, buccal mucosa, tongue:  normal    MSK:Normal    Cardiovascular: peripheral edema none    Pulm: Breathing Normal    Lymph Nodes: No Head and Neck Lymphadenopathy     Neuro/Psych: Orientation:Alert and Orientedx3 ; Mood/Affect:normal       A/P:  1. Seborrheic keratosis, lentigo, angioma, dermal nevus, hx of non-melanoma skin cancer   2. L shoulder inflamed seborrheic keratosis   LN2:  Treated with LN2 for 5s for 1-2 cycles. Warned risks of blistering, pain, pigment change, scarring, and incomplete resolution.  Advised patient to return if lesions do not completely resolve.  Wound care sheet given.  3. Actinic keratosis face  Efudex discussed with patient   L temple x4  R cheek x4  LN2:  Treated with LN2 for 5s for 1-2 cycles. Warned risks of blistering, pain, pigment change, scarring, and incomplete resolution.  Advised patient to return if lesions do not completely resolve.  Wound care sheet given.    It was a pleasure speaking to Dragan Prasad today.  Dragan to follow up with Primary Care provider regarding elevated blood pressure.  Previous clinic notes and pertinent laboratory tests were reviewed prior to Dragan Prasad's visit.  Signs and Symptoms of skin cancer discussed with patient.  Patient encouraged to perform monthly skin exams.  UV precautions reviewed with patient.  Risks of non-melanoma skin cancer discussed with patient   Return to clinic 6 months

## 2021-07-21 NOTE — NURSING NOTE
Chief Complaint   Patient presents with     Skin Check       Vitals:    07/21/21 1120   BP: (!) 156/94   BP Location: Left arm   Patient Position: Sitting   Cuff Size: Adult Large   Pulse: 61   SpO2: 95%     Wt Readings from Last 1 Encounters:   06/30/21 98.9 kg (218 lb)       Louise Reynoso LPN .................7/21/2021

## 2021-07-21 NOTE — LETTER
2021         RE: Dragan Prasad  [id number] [facility] [address]        Dear Colleague,    Thank you for referring your patient, Dragan Prasad, to the [facility]. Please see a copy of my visit note below.    Dragan Prasad is an extremely pleasant 85 year old year old male patient here today for itching spot son left shoulder.   .   Patient states this has been present for a while.  Patient reports the following symptoms:  itching.  Patient reports the following previous treatments none.  These treatments did not work.  Patient reports the following modifying factors none.  Associated symptoms: none.  Patient has no other skin complaints today.  Remainder of the HPI, Meds, PMH, Allergies, FH, and SH was reviewed in chart.      Past Medical History:   Diagnosis Date     Basal cell carcinoma      Diverticula of colon      Respiratory complications        Past Surgical History:   Procedure Laterality Date     ARTHROSCOPY OF JOINT UNLISTED      right knee about      C APPENDECTOMY       SURGICAL HISTORY OF -   02    Basal cell carcinoma w/positive margins, right  eyebrow & eyelid        Family History   Problem Relation Age of Onset     Cancer Mother         lung cancer     C.A.D. Father         uncertain.  age 79     Unknown/Adopted Maternal Grandmother      Unknown/Adopted Maternal Grandfather      Unknown/Adopted Paternal Grandmother      Unknown/Adopted Paternal Grandfather      Diabetes Brother         Dre     C.A.D. Brother         Dre  age 64       Social History     Socioeconomic History     Marital status:      Spouse name: Not on file     Number of children: Not on file     Years of education: Not on file     Highest education level: Not on file   Occupational History     Occupation: contracter     Employer: SELF   Tobacco Use     Smoking status: Former Smoker     Types: Cigarettes     Quit date: 1986     Years since quittin.5      Smokeless tobacco: Never Used   Substance and Sexual Activity     Alcohol use: No     Drug use: No     Sexual activity: Never   Other Topics Concern     Parent/sibling w/ CABG, MI or angioplasty before 65F 55M? Not Asked   Social History Narrative     Not on file     Social Determinants of Health     Financial Resource Strain:      Difficulty of Paying Living Expenses:    Food Insecurity:      Worried About Running Out of Food in the Last Year:      Ran Out of Food in the Last Year:    Transportation Needs:      Lack of Transportation (Medical):      Lack of Transportation (Non-Medical):    Physical Activity:      Days of Exercise per Week:      Minutes of Exercise per Session:    Stress:      Feeling of Stress :    Social Connections:      Frequency of Communication with Friends and Family:      Frequency of Social Gatherings with Friends and Family:      Attends Spiritism Services:      Active Member of Clubs or Organizations:      Attends Club or Organization Meetings:      Marital Status:    Intimate Partner Violence:      Fear of Current or Ex-Partner:      Emotionally Abused:      Physically Abused:      Sexually Abused:        Outpatient Encounter Medications as of 7/21/2021   Medication Sig Dispense Refill     ASPIRIN 325 MG PO TBEC Takes once in awhile  3     No facility-administered encounter medications on file as of 7/21/2021.             O:   NAD, WDWN, Alert & Oriented, Mood & Affect wnl, Vitals stable   Here today alone   BP (!) 156/94 (BP Location: Left arm, Patient Position: Sitting, Cuff Size: Adult Large)   Pulse 61   SpO2 95%    General appearance normal   Vitals stable   Alert, oriented and in no acute distress      Following lymph nodes palpated: Occipital, Cervical, Supraclavicular no lad   L shoulder inflamed seborrheic keratosis x2  Gritty papules on face     Stuck on papules and brown macules on trunk and ext   Red papules on trunk  Flesh colored papules on trunk     The remainder of  expanded problem focused exam was normal; the following areas were examined:  scalp/hair, conjunctiva/lids, face, neck, lips, back, ab , chest, digits/nails, RUE, LUE.      Eyes: Conjunctivae/lids:Normal     ENT: Lips, buccal mucosa, tongue: normal    MSK:Normal    Cardiovascular: peripheral edema none    Pulm: Breathing Normal    Lymph Nodes: No Head and Neck Lymphadenopathy     Neuro/Psych: Orientation:Alert and Orientedx3 ; Mood/Affect:normal       A/P:  1. Seborrheic keratosis, lentigo, angioma, dermal nevus, hx of non-melanoma skin cancer   2. L shoulder inflamed seborrheic keratosis   LN2:  Treated with LN2 for 5s for 1-2 cycles. Warned risks of blistering, pain, pigment change, scarring, and incomplete resolution.  Advised patient to return if lesions do not completely resolve.  Wound care sheet given.  3. Actinic keratosis face  Efudex discussed with patient   L temple x4  R cheek x4  LN2:  Treated with LN2 for 5s for 1-2 cycles. Warned risks of blistering, pain, pigment change, scarring, and incomplete resolution.  Advised patient to return if lesions do not completely resolve.  Wound care sheet given.    It was a pleasure speaking to Dragan Prasad today.  Dragan to follow up with Primary Care provider regarding elevated blood pressure.  Previous clinic notes and pertinent laboratory tests were reviewed prior to Dragan Prasad's visit.  Signs and Symptoms of skin cancer discussed with patient.  Patient encouraged to perform monthly skin exams.  UV precautions reviewed with patient.  Risks of non-melanoma skin cancer discussed with patient   Return to clinic 6 months        Again, thank you for allowing me to participate in the care of your patient.        Sincerely,        Javier Wadsworth MD

## 2021-08-31 ENCOUNTER — OFFICE VISIT (OUTPATIENT)
Dept: CARDIOLOGY | Facility: CLINIC | Age: 86
End: 2021-08-31
Attending: SURGERY
Payer: MEDICARE

## 2021-08-31 VITALS
DIASTOLIC BLOOD PRESSURE: 100 MMHG | BODY MASS INDEX: 30.88 KG/M2 | HEART RATE: 56 BPM | SYSTOLIC BLOOD PRESSURE: 173 MMHG | WEIGHT: 221.4 LBS

## 2021-08-31 DIAGNOSIS — R94.39 ABNORMAL CARDIOVASCULAR STRESS TEST: Primary | ICD-10-CM

## 2021-08-31 DIAGNOSIS — I71.40 ABDOMINAL AORTIC ANEURYSM (AAA) WITHOUT RUPTURE (H): ICD-10-CM

## 2021-08-31 DIAGNOSIS — Z01.810 ENCOUNTER FOR PRE-OPERATIVE CARDIOVASCULAR CLEARANCE: ICD-10-CM

## 2021-08-31 DIAGNOSIS — I25.10 CAD (CORONARY ARTERY DISEASE): ICD-10-CM

## 2021-08-31 DIAGNOSIS — R94.30 ABNORMAL CARDIAC FUNCTION TEST: Primary | ICD-10-CM

## 2021-08-31 PROCEDURE — 99204 OFFICE O/P NEW MOD 45 MIN: CPT | Performed by: INTERNAL MEDICINE

## 2021-08-31 RX ORDER — METOPROLOL SUCCINATE 25 MG/1
25 TABLET, EXTENDED RELEASE ORAL DAILY
Qty: 60 TABLET | Refills: 3 | Status: ON HOLD | OUTPATIENT
Start: 2021-08-31 | End: 2021-09-17

## 2021-08-31 RX ORDER — NITROGLYCERIN 0.4 MG/1
0.4 TABLET SUBLINGUAL EVERY 5 MIN PRN
Status: ACTIVE | OUTPATIENT
Start: 2021-08-31

## 2021-08-31 NOTE — LETTER
"8/31/2021    Altagracia Patel PA-C  [address]  [facility] [address]    RE: Dragan Prasad       Dear Colleague,    I had the pleasure of seeing Dragan Prasad in the [facility].          HPI:     This is an 85 year old male with PMH HTN, AAA needing EVAR (5.5 cm), HLD, HTN here for evaluation of abnormal stress test. He was seen by Dr. Angeles and was deemed a candidate for endovascular repair. His lexiscan showed small area of ischemia but significant TID and a drop in EF with stress to 38% which is indicative of possible left main coronary disease or multivessel coronary disease.     No chest pressure or pain. Doing exercise everyday - sit ups, weight lifting, leg raises (mainly floor exercises). Has 6 stairs at home and does not get chest pain at that time however occasionally he feels \"off\" after exercising or exertion. No SOB. No swelling in the legs. No smoking. Family history reviewed and brother had abdominal aneurysm.     No longer on any medications. Long standing HTN. Takes aspirin intermittently. He prefers not to be on many medications but listens to guidance.    I personally reviewed stress test as follows:     Stress test:       The nuclear stress test is abnormal.     Stress to rest cavity ratio is 1.22.  TID is present visually and quantitatively. This finding is suggestive of multivessel disease.     There is a small area of ischemia in the distal anterior and apical segment(s) of the left ventricle.     There is a small area of ischemia in the mid to basal inferior and inferolateral segment(s) of the left ventricle.     Left ventricular function is moderately reduced.     The left ventricular ejection fraction at stress is 38%.  The EF drops from 52% to 38% from rest to post-stress which is another high-risk finding.     Mild left ventricular enlargement is noted.     There is no prior study for comparison.     Cardiology " consultation and further evaluation for CAD is recommended prior to AAA surgery.       ASSESSMENT/PLAN:    1. HTN: poorly controlled and could be contributor to accelerated aortic growth. Given concern for significant coronary disease and ischemia I would like aggressive blood pressure control and will start beta blocker therapy.     2. AAA: aggressive blood pressure control. Will forward results of coronary angiogram to vascular surgery. Patient wishes to leave for [place] in two weeks and I said we would coordinate care accordingly if needed. He is asking about vascular surgery at [facility].    3. Abnormal stress test: abnormal TID, dilation of LV and loss of function with stress suggests multivessel CAD or left main coronary disease (high risk stress test)  -will set up for coronary angiogram this coming week   -SL nitroglycerin as needed  -beta blocker as above  -aspirin 81 mg daily     Follow up with ANGEL after angiogram, me in 6 months    Naye Sinclair mD MSC  [facility]           PAST MEDICAL HISTORY  Past Medical History:   Diagnosis Date     Basal cell carcinoma      Diverticula of colon      Respiratory complications        CURRENT MEDICATIONS  Current Outpatient Medications   Medication Sig Dispense Refill     ASPIRIN 325 MG PO TBEC Takes once in awhile  3       PAST SURGICAL HISTORY:  Past Surgical History:   Procedure Laterality Date     ARTHROSCOPY OF JOINT UNLISTED      right knee about      C APPENDECTOMY       SURGICAL HISTORY OF -   02    Basal cell carcinoma w/positive margins, right  eyebrow & eyelid       ALLERGIES   No Known Allergies    FAMILY HISTORY  Family History   Problem Relation Age of Onset     Cancer Mother         lung cancer     C.A.D. Father         uncertain.  age 79     Unknown/Adopted Maternal Grandmother      Unknown/Adopted Maternal Grandfather      Unknown/Adopted Paternal Grandmother      Unknown/Adopted Paternal Grandfather      Diabetes Brother          Dre ONEILL Brother         Dre  age 64         SOCIAL HISTORY  Social History     Socioeconomic History     Marital status:      Spouse name: Not on file     Number of children: Not on file     Years of education: Not on file     Highest education level: Not on file   Occupational History     Occupation: contracter     Employer: SELF   Tobacco Use     Smoking status: Former Smoker     Types: Cigarettes     Quit date: 1986     Years since quittin.6     Smokeless tobacco: Never Used   Substance and Sexual Activity     Alcohol use: No     Drug use: No     Sexual activity: Never   Other Topics Concern     Parent/sibling w/ CABG, MI or angioplasty before 65F 55M? Not Asked   Social History Narrative     Not on file     Social Determinants of Health     Financial Resource Strain:      Difficulty of Paying Living Expenses:    Food Insecurity:      Worried About Running Out of Food in the Last Year:      Ran Out of Food in the Last Year:    Transportation Needs:      Lack of Transportation (Medical):      Lack of Transportation (Non-Medical):    Physical Activity:      Days of Exercise per Week:      Minutes of Exercise per Session:    Stress:      Feeling of Stress :    Social Connections:      Frequency of Communication with Friends and Family:      Frequency of Social Gatherings with Friends and Family:      Attends Restorationism Services:      Active Member of Clubs or Organizations:      Attends Club or Organization Meetings:      Marital Status:    Intimate Partner Violence:      Fear of Current or Ex-Partner:      Emotionally Abused:      Physically Abused:      Sexually Abused:        ROS:   Constitutional: No fever, chills, or sweats. No weight gain/loss   ENT: No visual disturbance, ear ache, epistaxis, sore throat  Allergies/Immunologic: Negative  Respiratory: No cough, hemoptysia  Cardiovascular: As per HPI  GI: No nausea, vomiting, hematemesis, melena, or hematochezia  : No  urinary frequency, dysuria, or hematuria  Integument: Negative  Psychiatric: Negative  Neuro: Negative  Endocrinology: Negative   Musculoskeletal: Negative  Vascular: No walking impairment, claudication, ischemic rest pain or nonhealing wounds    EXAM:  BP (!) 173/100   Pulse 56   Wt 100.4 kg (221 lb 6.4 oz)   BMI 30.88 kg/m    In general, the patient is a pleasant male in no apparent distress.    HEENT: NC/AT.  PERRLA.  EOMI.  Sclerae white, not injected.    Neck: No adenopathy.  No thyromegaly. Carotids +2/2 bilaterally without bruits.  No jugular venous distension.   Heart: RRR. Normal S1, S2 splits physiologically. No murmur, rub, click, or gallop.   Lungs: CTA.  No ronchi, wheezes, rales.   Abdomen: Soft, nontender, nondistended. No organomegaly. No AAA.  No bruits.   Extremities: No clubbing, cyanosis, or edema.  No wounds. No varicose veins signs of chronic venous insufficiency.   Vascular: No bruits are noted.    Labs:  LIPID RESULTS:  Lab Results   Component Value Date    CHOL 231 (H) 06/06/2017    HDL 46 06/06/2017     (H) 06/06/2017    TRIG 169 (H) 06/06/2017    CHOLHDLRATIO 6.0 (H) 05/31/2008    NHDL 185 (H) 06/06/2017       LIVER ENZYME RESULTS:  Lab Results   Component Value Date    AST 38 07/20/2007    ALT 28 07/20/2007       CBC RESULTS:  Lab Results   Component Value Date    WBC 8.8 03/09/2011    RBC 4.72 03/09/2011    HGB 14.5 03/09/2011    HCT 43.5 03/09/2011    MCV 92 03/09/2011    MCH 30.7 03/09/2011    MCHC 33.3 03/09/2011    RDW 13.2 03/09/2011     03/09/2011       BMP RESULTS:  Lab Results   Component Value Date     06/06/2017    POTASSIUM 4.4 06/06/2017    CHLORIDE 108 06/06/2017    CO2 23 06/06/2017    ANIONGAP 9 06/06/2017    GLC 89 06/06/2017    BUN 17 06/06/2017    CR 1.03 06/06/2017    GFRESTIMATED 53 (L) 05/25/2021    GFRESTBLACK 64 05/25/2021    ARELI 9.6 06/06/2017        A1C RESULTS:  No results found for: A1C          Thank you for allowing me to participate in  the care of your patient.      Sincerely,     Naye Sinclair MD     [facility]  cc:   Milana Angeles MD  [address] [place] [id number]

## 2021-08-31 NOTE — PROGRESS NOTES
"      HPI:     This is an 85 year old male with PMH HTN, AAA needing EVAR (5.5 cm), HLD, HTN here for evaluation of abnormal stress test. He was seen by Dr. Angeles and was deemed a candidate for endovascular repair. His lexiscan showed small area of ischemia but significant TID and a drop in EF with stress to 38% which is indicative of possible left main coronary disease or multivessel coronary disease.     No chest pressure or pain. Doing exercise everyday - sit ups, weight lifting, leg raises (mainly floor exercises). Has 6 stairs at home and does not get chest pain at that time however occasionally he feels \"off\" after exercising or exertion. No SOB. No swelling in the legs. No smoking. Family history reviewed and brother had abdominal aneurysm.     No longer on any medications. Long standing HTN. Takes aspirin intermittently. He prefers not to be on many medications but listens to guidance.    I personally reviewed stress test as follows:     Stress test:       The nuclear stress test is abnormal.     Stress to rest cavity ratio is 1.22.  TID is present visually and quantitatively. This finding is suggestive of multivessel disease.     There is a small area of ischemia in the distal anterior and apical segment(s) of the left ventricle.     There is a small area of ischemia in the mid to basal inferior and inferolateral segment(s) of the left ventricle.     Left ventricular function is moderately reduced.     The left ventricular ejection fraction at stress is 38%.  The EF drops from 52% to 38% from rest to post-stress which is another high-risk finding.     Mild left ventricular enlargement is noted.     There is no prior study for comparison.     Cardiology consultation and further evaluation for CAD is recommended prior to AAA surgery.       ASSESSMENT/PLAN:    1. HTN: poorly controlled and could be contributor to accelerated aortic growth. Given concern for significant coronary disease and ischemia I would " like aggressive blood pressure control and will start beta blocker therapy.     2. AAA: aggressive blood pressure control. Will forward results of coronary angiogram to vascular surgery. Patient wishes to leave for [place] in two weeks and I said we would coordinate care accordingly if needed. He is asking about vascular surgery at [facility].    3. Abnormal stress test: abnormal TID, dilation of LV and loss of function with stress suggests multivessel CAD or left main coronary disease (high risk stress test)  -will set up for coronary angiogram this coming week   -SL nitroglycerin as needed  -beta blocker as above  -aspirin 81 mg daily     Follow up with ANGEL after angiogram, me in 6 months    Naye Sinclair mD MSC  [facility]           PAST MEDICAL HISTORY  Past Medical History:   Diagnosis Date     Basal cell carcinoma      Diverticula of colon      Respiratory complications        CURRENT MEDICATIONS  Current Outpatient Medications   Medication Sig Dispense Refill     ASPIRIN 325 MG PO TBEC Takes once in awhile  3       PAST SURGICAL HISTORY:  Past Surgical History:   Procedure Laterality Date     ARTHROSCOPY OF JOINT UNLISTED      right knee about      C APPENDECTOMY       SURGICAL HISTORY OF -   02    Basal cell carcinoma w/positive margins, right  eyebrow & eyelid       ALLERGIES   No Known Allergies    FAMILY HISTORY  Family History   Problem Relation Age of Onset     Cancer Mother         lung cancer     C.A.D. Father         uncertain.  age 79     Unknown/Adopted Maternal Grandmother      Unknown/Adopted Maternal Grandfather      Unknown/Adopted Paternal Grandmother      Unknown/Adopted Paternal Grandfather      Diabetes Brother         Dre     DRAKE. Brother         Dre  age 64         SOCIAL HISTORY  Social History     Socioeconomic History     Marital status:      Spouse name: Not on file     Number of children: Not on file     Years of education: Not on file      Highest education level: Not on file   Occupational History     Occupation: contracter     Employer: SELF   Tobacco Use     Smoking status: Former Smoker     Types: Cigarettes     Quit date: 1986     Years since quittin.6     Smokeless tobacco: Never Used   Substance and Sexual Activity     Alcohol use: No     Drug use: No     Sexual activity: Never   Other Topics Concern     Parent/sibling w/ CABG, MI or angioplasty before 65F 55M? Not Asked   Social History Narrative     Not on file     Social Determinants of Health     Financial Resource Strain:      Difficulty of Paying Living Expenses:    Food Insecurity:      Worried About Running Out of Food in the Last Year:      Ran Out of Food in the Last Year:    Transportation Needs:      Lack of Transportation (Medical):      Lack of Transportation (Non-Medical):    Physical Activity:      Days of Exercise per Week:      Minutes of Exercise per Session:    Stress:      Feeling of Stress :    Social Connections:      Frequency of Communication with Friends and Family:      Frequency of Social Gatherings with Friends and Family:      Attends Buddhism Services:      Active Member of Clubs or Organizations:      Attends Club or Organization Meetings:      Marital Status:    Intimate Partner Violence:      Fear of Current or Ex-Partner:      Emotionally Abused:      Physically Abused:      Sexually Abused:        ROS:   Constitutional: No fever, chills, or sweats. No weight gain/loss   ENT: No visual disturbance, ear ache, epistaxis, sore throat  Allergies/Immunologic: Negative  Respiratory: No cough, hemoptysia  Cardiovascular: As per HPI  GI: No nausea, vomiting, hematemesis, melena, or hematochezia  : No urinary frequency, dysuria, or hematuria  Integument: Negative  Psychiatric: Negative  Neuro: Negative  Endocrinology: Negative   Musculoskeletal: Negative  Vascular: No walking impairment, claudication, ischemic rest pain or nonhealing wounds    EXAM:  BP (!)  173/100   Pulse 56   Wt 100.4 kg (221 lb 6.4 oz)   BMI 30.88 kg/m    In general, the patient is a pleasant male in no apparent distress.    HEENT: NC/AT.  PERRLA.  EOMI.  Sclerae white, not injected.    Neck: No adenopathy.  No thyromegaly. Carotids +2/2 bilaterally without bruits.  No jugular venous distension.   Heart: RRR. Normal S1, S2 splits physiologically. No murmur, rub, click, or gallop.   Lungs: CTA.  No ronchi, wheezes, rales.   Abdomen: Soft, nontender, nondistended. No organomegaly. No AAA.  No bruits.   Extremities: No clubbing, cyanosis, or edema.  No wounds. No varicose veins signs of chronic venous insufficiency.   Vascular: No bruits are noted.    Labs:  LIPID RESULTS:  Lab Results   Component Value Date    CHOL 231 (H) 06/06/2017    HDL 46 06/06/2017     (H) 06/06/2017    TRIG 169 (H) 06/06/2017    CHOLHDLRATIO 6.0 (H) 05/31/2008    NHDL 185 (H) 06/06/2017       LIVER ENZYME RESULTS:  Lab Results   Component Value Date    AST 38 07/20/2007    ALT 28 07/20/2007       CBC RESULTS:  Lab Results   Component Value Date    WBC 8.8 03/09/2011    RBC 4.72 03/09/2011    HGB 14.5 03/09/2011    HCT 43.5 03/09/2011    MCV 92 03/09/2011    MCH 30.7 03/09/2011    MCHC 33.3 03/09/2011    RDW 13.2 03/09/2011     03/09/2011       BMP RESULTS:  Lab Results   Component Value Date     06/06/2017    POTASSIUM 4.4 06/06/2017    CHLORIDE 108 06/06/2017    CO2 23 06/06/2017    ANIONGAP 9 06/06/2017    GLC 89 06/06/2017    BUN 17 06/06/2017    CR 1.03 06/06/2017    GFRESTIMATED 53 (L) 05/25/2021    GFRESTBLACK 64 05/25/2021    ARELI 9.6 06/06/2017        A1C RESULTS:  No results found for: A1C

## 2021-08-31 NOTE — PATIENT INSTRUCTIONS
1. Coronary angiogram at [facility] in next week   2. Follow up with ANGEL in one week   3. Metoprolol 25 mg once a day   4. Follow up with Dr. Sinclair in 3 months   5. SL nitroglycerin under the tongue for any symptoms of heart attack

## 2021-09-02 ENCOUNTER — HOSPITAL ENCOUNTER (OUTPATIENT)
Dept: CARDIOLOGY | Facility: CLINIC | Age: 86
Discharge: HOME | End: 2021-09-02
Payer: MEDICARE

## 2021-09-02 DIAGNOSIS — R94.39 ABNORMAL CARDIOVASCULAR STRESS TEST: ICD-10-CM

## 2021-09-02 DIAGNOSIS — I71.40 ABDOMINAL AORTIC ANEURYSM (AAA) WITHOUT RUPTURE (H): ICD-10-CM

## 2021-09-02 DIAGNOSIS — Z01.810 ENCOUNTER FOR PRE-OPERATIVE CARDIOVASCULAR CLEARANCE: ICD-10-CM

## 2021-09-02 LAB — LVEF ECHO: NORMAL

## 2021-09-02 PROCEDURE — 93306 TTE W/DOPPLER COMPLETE: CPT | Mod: 26 | Performed by: INTERNAL MEDICINE

## 2021-09-02 PROCEDURE — 93306 TTE W/DOPPLER COMPLETE: CPT

## 2021-09-03 ENCOUNTER — LAB (OUTPATIENT)
Dept: LAB | Facility: CLINIC | Age: 86
End: 2021-09-03
Payer: MEDICARE

## 2021-09-03 ENCOUNTER — DOCUMENTATION ONLY (OUTPATIENT)
Dept: CARDIOLOGY | Facility: CLINIC | Age: 86
End: 2021-09-03

## 2021-09-03 DIAGNOSIS — R94.39 ABNORMAL CARDIOVASCULAR STRESS TEST: Primary | ICD-10-CM

## 2021-09-03 DIAGNOSIS — R94.30 ABNORMAL CARDIAC FUNCTION TEST: ICD-10-CM

## 2021-09-03 DIAGNOSIS — I25.10 CAD (CORONARY ARTERY DISEASE): ICD-10-CM

## 2021-09-03 PROCEDURE — U0003 INFECTIOUS AGENT DETECTION BY NUCLEIC ACID (DNA OR RNA); SEVERE ACUTE RESPIRATORY SYNDROME CORONAVIRUS 2 (SARS-COV-2) (CORONAVIRUS DISEASE [COVID-19]), AMPLIFIED PROBE TECHNIQUE, MAKING USE OF HIGH THROUGHPUT TECHNOLOGIES AS DESCRIBED BY CMS-2020-01-R: HCPCS

## 2021-09-03 PROCEDURE — U0005 INFEC AGEN DETEC AMPLI PROBE: HCPCS

## 2021-09-03 RX ORDER — ASPIRIN 325 MG
325 TABLET ORAL ONCE
Status: CANCELLED | OUTPATIENT
Start: 2021-09-03 | End: 2021-09-03

## 2021-09-03 RX ORDER — ASPIRIN 81 MG/1
243 TABLET, CHEWABLE ORAL ONCE
Status: CANCELLED | OUTPATIENT
Start: 2021-09-03

## 2021-09-03 RX ORDER — POTASSIUM CHLORIDE 1500 MG/1
20 TABLET, EXTENDED RELEASE ORAL
Status: CANCELLED | OUTPATIENT
Start: 2021-09-03

## 2021-09-03 RX ORDER — LIDOCAINE 40 MG/G
CREAM TOPICAL
Status: CANCELLED | OUTPATIENT
Start: 2021-09-03

## 2021-09-03 RX ORDER — SODIUM CHLORIDE 9 MG/ML
INJECTION, SOLUTION INTRAVENOUS CONTINUOUS
Status: CANCELLED | OUTPATIENT
Start: 2021-09-03

## 2021-09-03 NOTE — PROGRESS NOTES
Spoke to patient to review [facility] scheduled 9/7/21 to arrive at 0630 for a 0830 procedure. Instructed patient to not eat or drink after midnight and take AM medications to include Aspirin 325mg.  Verified patient doesn't have a known contrast allergy. Verified that patient will have a ride nd be available 24 hours post [facility].  Answered all patient's questions and verbalized understanding. Orders placed in EPIC. Covid test pending.         ADRIENNE Moore September 3, 2021 9:16 AM

## 2021-09-04 LAB — SARS-COV-2 RNA RESP QL NAA+PROBE: NEGATIVE

## 2021-09-07 ENCOUNTER — APPOINTMENT (OUTPATIENT)
Dept: ULTRASOUND IMAGING | Facility: CLINIC | Age: 86
DRG: 235 | End: 2021-09-07
Attending: SURGERY
Payer: MEDICARE

## 2021-09-07 ENCOUNTER — APPOINTMENT (OUTPATIENT)
Dept: CT IMAGING | Facility: CLINIC | Age: 86
DRG: 235 | End: 2021-09-07
Attending: SURGERY
Payer: MEDICARE

## 2021-09-07 ENCOUNTER — HOSPITAL ENCOUNTER (INPATIENT)
Facility: CLINIC | Age: 86
LOS: 10 days | Discharge: HOME | DRG: 235 | End: 2021-09-17
Admitting: INTERNAL MEDICINE
Payer: MEDICARE

## 2021-09-07 DIAGNOSIS — Z95.1 S/P CABG (CORONARY ARTERY BYPASS GRAFT): Primary | ICD-10-CM

## 2021-09-07 DIAGNOSIS — Z01.810 ENCOUNTER FOR PRE-OPERATIVE CARDIOVASCULAR CLEARANCE: ICD-10-CM

## 2021-09-07 DIAGNOSIS — I25.10 CORONARY ARTERY DISEASE INVOLVING NATIVE CORONARY ARTERY OF NATIVE HEART WITHOUT ANGINA PECTORIS: ICD-10-CM

## 2021-09-07 DIAGNOSIS — R94.39 ABNORMAL CARDIOVASCULAR STRESS TEST: ICD-10-CM

## 2021-09-07 DIAGNOSIS — I71.40 ABDOMINAL AORTIC ANEURYSM (AAA) WITHOUT RUPTURE (H): ICD-10-CM

## 2021-09-07 PROBLEM — I25.110: Status: ACTIVE | Noted: 2021-09-07

## 2021-09-07 LAB
ABO/RH(D): NORMAL
ALBUMIN SERPL-MCNC: 3.4 G/DL (ref 3.4–5)
ALP SERPL-CCNC: 71 U/L (ref 40–150)
ALT SERPL W P-5'-P-CCNC: 57 U/L (ref 0–70)
ANION GAP SERPL CALCULATED.3IONS-SCNC: 2 MMOL/L (ref 3–14)
ANION GAP SERPL CALCULATED.3IONS-SCNC: 4 MMOL/L (ref 3–14)
ANTIBODY SCREEN: NEGATIVE
APTT PPP: 32 SECONDS (ref 22–38)
AST SERPL W P-5'-P-CCNC: 44 U/L (ref 0–45)
BASE EXCESS BLDA CALC-SCNC: -1.6 MMOL/L (ref -9–1.8)
BILIRUB DIRECT SERPL-MCNC: 0.1 MG/DL (ref 0–0.2)
BILIRUB SERPL-MCNC: 0.5 MG/DL (ref 0.2–1.3)
BUN SERPL-MCNC: 21 MG/DL (ref 7–30)
BUN SERPL-MCNC: 23 MG/DL (ref 7–30)
CALCIUM SERPL-MCNC: 8.8 MG/DL (ref 8.5–10.1)
CALCIUM SERPL-MCNC: 9.1 MG/DL (ref 8.5–10.1)
CHLORIDE BLD-SCNC: 111 MMOL/L (ref 94–109)
CHLORIDE BLD-SCNC: 115 MMOL/L (ref 94–109)
CO2 SERPL-SCNC: 23 MMOL/L (ref 20–32)
CO2 SERPL-SCNC: 26 MMOL/L (ref 20–32)
CREAT SERPL-MCNC: 1.25 MG/DL (ref 0.66–1.25)
CREAT SERPL-MCNC: 1.36 MG/DL (ref 0.66–1.25)
ERYTHROCYTE [DISTWIDTH] IN BLOOD BY AUTOMATED COUNT: 12.5 % (ref 10–15)
ERYTHROCYTE [DISTWIDTH] IN BLOOD BY AUTOMATED COUNT: 12.5 % (ref 10–15)
GFR SERPL CREATININE-BSD FRML MDRD: 47 ML/MIN/1.73M2
GFR SERPL CREATININE-BSD FRML MDRD: 53 ML/MIN/1.73M2
GLUCOSE BLD-MCNC: 92 MG/DL (ref 70–99)
GLUCOSE BLD-MCNC: 98 MG/DL (ref 70–99)
GLUCOSE BLDC GLUCOMTR-MCNC: 109 MG/DL (ref 70–99)
GLUCOSE BLDC GLUCOMTR-MCNC: 144 MG/DL (ref 70–99)
HBA1C MFR BLD: 5.8 % (ref 0–5.6)
HCO3 BLD-SCNC: 24 MMOL/L (ref 21–28)
HCT VFR BLD AUTO: 45.4 % (ref 40–53)
HCT VFR BLD AUTO: 47.7 % (ref 40–53)
HGB BLD-MCNC: 15 G/DL (ref 13.3–17.7)
HGB BLD-MCNC: 15.3 G/DL (ref 13.3–17.7)
INR PPP: 1.05 (ref 0.85–1.15)
MAGNESIUM SERPL-MCNC: 2 MG/DL (ref 1.6–2.3)
MCH RBC QN AUTO: 30.3 PG (ref 26.5–33)
MCH RBC QN AUTO: 30.9 PG (ref 26.5–33)
MCHC RBC AUTO-ENTMCNC: 32.1 G/DL (ref 31.5–36.5)
MCHC RBC AUTO-ENTMCNC: 33 G/DL (ref 31.5–36.5)
MCV RBC AUTO: 93 FL (ref 78–100)
MCV RBC AUTO: 95 FL (ref 78–100)
O2/TOTAL GAS SETTING VFR VENT: 0 %
OXYHGB MFR BLD: 93 % (ref 92–100)
PCO2 BLD: 44 MM HG (ref 35–45)
PH BLD: 7.35 [PH] (ref 7.35–7.45)
PHOSPHATE SERPL-MCNC: 2.8 MG/DL (ref 2.5–4.5)
PLATELET # BLD AUTO: 239 10E3/UL (ref 150–450)
PLATELET # BLD AUTO: 245 10E3/UL (ref 150–450)
PO2 BLD: 72 MM HG (ref 80–105)
POTASSIUM BLD-SCNC: 4.3 MMOL/L (ref 3.4–5.3)
POTASSIUM BLD-SCNC: 4.3 MMOL/L (ref 3.4–5.3)
PROT SERPL-MCNC: 7.3 G/DL (ref 6.8–8.8)
RBC # BLD AUTO: 4.86 10E6/UL (ref 4.4–5.9)
RBC # BLD AUTO: 5.05 10E6/UL (ref 4.4–5.9)
SODIUM SERPL-SCNC: 139 MMOL/L (ref 133–144)
SODIUM SERPL-SCNC: 142 MMOL/L (ref 133–144)
SPECIMEN EXPIRATION DATE: NORMAL
TROPONIN I SERPL-MCNC: 0.2 UG/L (ref 0–0.04)
TROPONIN I SERPL-MCNC: 0.36 UG/L (ref 0–0.04)
WBC # BLD AUTO: 8.3 10E3/UL (ref 4–11)
WBC # BLD AUTO: 9.9 10E3/UL (ref 4–11)

## 2021-09-07 PROCEDURE — 250N000011 HC RX IP 250 OP 636: Performed by: INTERNAL MEDICINE

## 2021-09-07 PROCEDURE — 258N000003 HC RX IP 258 OP 636: Performed by: INTERNAL MEDICINE

## 2021-09-07 PROCEDURE — 99222 1ST HOSP IP/OBS MODERATE 55: CPT | Performed by: SURGERY

## 2021-09-07 PROCEDURE — 85730 THROMBOPLASTIN TIME PARTIAL: CPT | Performed by: INTERNAL MEDICINE

## 2021-09-07 PROCEDURE — 85027 COMPLETE CBC AUTOMATED: CPT | Performed by: INTERNAL MEDICINE

## 2021-09-07 PROCEDURE — 93880 EXTRACRANIAL BILAT STUDY: CPT

## 2021-09-07 PROCEDURE — 85027 COMPLETE CBC AUTOMATED: CPT | Performed by: STUDENT IN AN ORGANIZED HEALTH CARE EDUCATION/TRAINING PROGRAM

## 2021-09-07 PROCEDURE — 93971 EXTREMITY STUDY: CPT | Mod: LT

## 2021-09-07 PROCEDURE — 82248 BILIRUBIN DIRECT: CPT | Performed by: SURGERY

## 2021-09-07 PROCEDURE — 999N000071 HC STATISTIC HEART CATH LAB OR EP LAB

## 2021-09-07 PROCEDURE — 99223 1ST HOSP IP/OBS HIGH 75: CPT | Mod: 25 | Performed by: INTERNAL MEDICINE

## 2021-09-07 PROCEDURE — 250N000009 HC RX 250: Performed by: INTERNAL MEDICINE

## 2021-09-07 PROCEDURE — 93458 L HRT ARTERY/VENTRICLE ANGIO: CPT | Mod: 26 | Performed by: INTERNAL MEDICINE

## 2021-09-07 PROCEDURE — 272N000001 HC OR GENERAL SUPPLY STERILE: Performed by: INTERNAL MEDICINE

## 2021-09-07 PROCEDURE — 99152 MOD SED SAME PHYS/QHP 5/>YRS: CPT | Performed by: INTERNAL MEDICINE

## 2021-09-07 PROCEDURE — 86900 BLOOD TYPING SEROLOGIC ABO: CPT | Performed by: SURGERY

## 2021-09-07 PROCEDURE — 93005 ELECTROCARDIOGRAM TRACING: CPT

## 2021-09-07 PROCEDURE — 83735 ASSAY OF MAGNESIUM: CPT | Performed by: STUDENT IN AN ORGANIZED HEALTH CARE EDUCATION/TRAINING PROGRAM

## 2021-09-07 PROCEDURE — C1894 INTRO/SHEATH, NON-LASER: HCPCS | Performed by: INTERNAL MEDICINE

## 2021-09-07 PROCEDURE — 84100 ASSAY OF PHOSPHORUS: CPT | Performed by: STUDENT IN AN ORGANIZED HEALTH CARE EDUCATION/TRAINING PROGRAM

## 2021-09-07 PROCEDURE — 200N000001 HC R&B ICU

## 2021-09-07 PROCEDURE — 80048 BASIC METABOLIC PNL TOTAL CA: CPT | Performed by: INTERNAL MEDICINE

## 2021-09-07 PROCEDURE — 83036 HEMOGLOBIN GLYCOSYLATED A1C: CPT | Performed by: SURGERY

## 2021-09-07 PROCEDURE — 250N000013 HC RX MED GY IP 250 OP 250 PS 637: Performed by: INTERNAL MEDICINE

## 2021-09-07 PROCEDURE — 82805 BLOOD GASES W/O2 SATURATION: CPT | Performed by: STUDENT IN AN ORGANIZED HEALTH CARE EDUCATION/TRAINING PROGRAM

## 2021-09-07 PROCEDURE — 85610 PROTHROMBIN TIME: CPT | Performed by: INTERNAL MEDICINE

## 2021-09-07 PROCEDURE — C1769 GUIDE WIRE: HCPCS | Performed by: INTERNAL MEDICINE

## 2021-09-07 PROCEDURE — 93458 L HRT ARTERY/VENTRICLE ANGIO: CPT | Performed by: INTERNAL MEDICINE

## 2021-09-07 PROCEDURE — 84484 ASSAY OF TROPONIN QUANT: CPT | Performed by: INTERNAL MEDICINE

## 2021-09-07 PROCEDURE — 71250 CT THORAX DX C-: CPT | Mod: ME

## 2021-09-07 PROCEDURE — 999N000184 HC STATISTIC TELEMETRY

## 2021-09-07 PROCEDURE — 250N000011 HC RX IP 250 OP 636: Performed by: STUDENT IN AN ORGANIZED HEALTH CARE EDUCATION/TRAINING PROGRAM

## 2021-09-07 PROCEDURE — 99291 CRITICAL CARE FIRST HOUR: CPT | Mod: 25 | Performed by: INTERNAL MEDICINE

## 2021-09-07 PROCEDURE — 36415 COLL VENOUS BLD VENIPUNCTURE: CPT | Performed by: INTERNAL MEDICINE

## 2021-09-07 PROCEDURE — 84484 ASSAY OF TROPONIN QUANT: CPT | Performed by: STUDENT IN AN ORGANIZED HEALTH CARE EDUCATION/TRAINING PROGRAM

## 2021-09-07 RX ORDER — FLUMAZENIL 0.1 MG/ML
0.2 INJECTION, SOLUTION INTRAVENOUS
Status: ACTIVE | OUTPATIENT
Start: 2021-09-07 | End: 2021-09-07

## 2021-09-07 RX ORDER — IOPAMIDOL 755 MG/ML
INJECTION, SOLUTION INTRAVASCULAR
Status: DISCONTINUED | OUTPATIENT
Start: 2021-09-07 | End: 2021-09-07 | Stop reason: HOSPADM

## 2021-09-07 RX ORDER — ATROPINE SULFATE 0.1 MG/ML
0.5 INJECTION INTRAVENOUS
Status: ACTIVE | OUTPATIENT
Start: 2021-09-07 | End: 2021-09-07

## 2021-09-07 RX ORDER — SODIUM CHLORIDE 9 MG/ML
75 INJECTION, SOLUTION INTRAVENOUS CONTINUOUS
Status: ACTIVE | OUTPATIENT
Start: 2021-09-07 | End: 2021-09-07

## 2021-09-07 RX ORDER — NICARDIPINE HYDROCHLORIDE 0.2 MG/ML
.5-15 INJECTION INTRAVENOUS CONTINUOUS
Status: DISCONTINUED | OUTPATIENT
Start: 2021-09-07 | End: 2021-09-10

## 2021-09-07 RX ORDER — NALOXONE HYDROCHLORIDE 0.4 MG/ML
0.4 INJECTION, SOLUTION INTRAMUSCULAR; INTRAVENOUS; SUBCUTANEOUS
Status: DISCONTINUED | OUTPATIENT
Start: 2021-09-07 | End: 2021-09-10

## 2021-09-07 RX ORDER — ONDANSETRON 2 MG/ML
INJECTION INTRAMUSCULAR; INTRAVENOUS
Status: DISCONTINUED | OUTPATIENT
Start: 2021-09-07 | End: 2021-09-07 | Stop reason: HOSPADM

## 2021-09-07 RX ORDER — ROPIVACAINE IN 0.9% SOD CHL/PF 0.1 %
.03-.125 PLASTIC BAG, INJECTION (ML) EPIDURAL CONTINUOUS
Status: DISCONTINUED | OUTPATIENT
Start: 2021-09-07 | End: 2021-09-08

## 2021-09-07 RX ORDER — ONDANSETRON 2 MG/ML
4 INJECTION INTRAMUSCULAR; INTRAVENOUS EVERY 6 HOURS PRN
Status: DISCONTINUED | OUTPATIENT
Start: 2021-09-07 | End: 2021-09-10

## 2021-09-07 RX ORDER — NALOXONE HYDROCHLORIDE 0.4 MG/ML
0.4 INJECTION, SOLUTION INTRAMUSCULAR; INTRAVENOUS; SUBCUTANEOUS
Status: DISCONTINUED | OUTPATIENT
Start: 2021-09-07 | End: 2021-09-07

## 2021-09-07 RX ORDER — NICOTINE POLACRILEX 4 MG
15-30 LOZENGE BUCCAL
Status: DISCONTINUED | OUTPATIENT
Start: 2021-09-07 | End: 2021-09-10

## 2021-09-07 RX ORDER — ONDANSETRON 4 MG/1
4 TABLET, ORALLY DISINTEGRATING ORAL EVERY 6 HOURS PRN
Status: DISCONTINUED | OUTPATIENT
Start: 2021-09-07 | End: 2021-09-10

## 2021-09-07 RX ORDER — OXYCODONE HYDROCHLORIDE 5 MG/1
5 TABLET ORAL EVERY 4 HOURS PRN
Status: DISCONTINUED | OUTPATIENT
Start: 2021-09-07 | End: 2021-09-10

## 2021-09-07 RX ORDER — FENTANYL CITRATE 50 UG/ML
25 INJECTION, SOLUTION INTRAMUSCULAR; INTRAVENOUS
Status: DISCONTINUED | OUTPATIENT
Start: 2021-09-07 | End: 2021-09-10

## 2021-09-07 RX ORDER — AMOXICILLIN 250 MG
1 CAPSULE ORAL 2 TIMES DAILY PRN
Status: DISCONTINUED | OUTPATIENT
Start: 2021-09-07 | End: 2021-09-10

## 2021-09-07 RX ORDER — HYDROMORPHONE HCL IN WATER/PF 6 MG/30 ML
0.2 PATIENT CONTROLLED ANALGESIA SYRINGE INTRAVENOUS
Status: DISCONTINUED | OUTPATIENT
Start: 2021-09-07 | End: 2021-09-10

## 2021-09-07 RX ORDER — ASPIRIN 81 MG/1
243 TABLET, CHEWABLE ORAL ONCE
Status: DISCONTINUED | OUTPATIENT
Start: 2021-09-07 | End: 2021-09-07 | Stop reason: HOSPADM

## 2021-09-07 RX ORDER — AMOXICILLIN 250 MG
2 CAPSULE ORAL 2 TIMES DAILY PRN
Status: DISCONTINUED | OUTPATIENT
Start: 2021-09-07 | End: 2021-09-10

## 2021-09-07 RX ORDER — NALOXONE HYDROCHLORIDE 0.4 MG/ML
0.2 INJECTION, SOLUTION INTRAMUSCULAR; INTRAVENOUS; SUBCUTANEOUS
Status: DISCONTINUED | OUTPATIENT
Start: 2021-09-07 | End: 2021-09-07

## 2021-09-07 RX ORDER — VERAPAMIL HYDROCHLORIDE 2.5 MG/ML
INJECTION, SOLUTION INTRAVENOUS
Status: DISCONTINUED | OUTPATIENT
Start: 2021-09-07 | End: 2021-09-07 | Stop reason: HOSPADM

## 2021-09-07 RX ORDER — POTASSIUM CHLORIDE 1500 MG/1
20 TABLET, EXTENDED RELEASE ORAL
Status: DISCONTINUED | OUTPATIENT
Start: 2021-09-07 | End: 2021-09-07 | Stop reason: HOSPADM

## 2021-09-07 RX ORDER — OXYCODONE HYDROCHLORIDE 5 MG/1
10 TABLET ORAL EVERY 4 HOURS PRN
Status: DISCONTINUED | OUTPATIENT
Start: 2021-09-07 | End: 2021-09-10

## 2021-09-07 RX ORDER — DEXTROSE MONOHYDRATE 25 G/50ML
25-50 INJECTION, SOLUTION INTRAVENOUS
Status: DISCONTINUED | OUTPATIENT
Start: 2021-09-07 | End: 2021-09-10

## 2021-09-07 RX ORDER — HEPARIN SODIUM 1000 [USP'U]/ML
INJECTION, SOLUTION INTRAVENOUS; SUBCUTANEOUS
Status: DISCONTINUED | OUTPATIENT
Start: 2021-09-07 | End: 2021-09-07 | Stop reason: HOSPADM

## 2021-09-07 RX ORDER — ACETAMINOPHEN 325 MG/1
650 TABLET ORAL EVERY 4 HOURS PRN
Status: DISCONTINUED | OUTPATIENT
Start: 2021-09-07 | End: 2021-09-10

## 2021-09-07 RX ORDER — SODIUM CHLORIDE 9 MG/ML
INJECTION, SOLUTION INTRAVENOUS CONTINUOUS
Status: DISCONTINUED | OUTPATIENT
Start: 2021-09-07 | End: 2021-09-07 | Stop reason: HOSPADM

## 2021-09-07 RX ORDER — NALOXONE HYDROCHLORIDE 0.4 MG/ML
0.2 INJECTION, SOLUTION INTRAMUSCULAR; INTRAVENOUS; SUBCUTANEOUS
Status: DISCONTINUED | OUTPATIENT
Start: 2021-09-07 | End: 2021-09-10

## 2021-09-07 RX ORDER — NITROGLYCERIN 5 MG/ML
VIAL (ML) INTRAVENOUS
Status: DISCONTINUED | OUTPATIENT
Start: 2021-09-07 | End: 2021-09-07 | Stop reason: HOSPADM

## 2021-09-07 RX ORDER — HEPARIN SODIUM 5000 [USP'U]/.5ML
5000 INJECTION, SOLUTION INTRAVENOUS; SUBCUTANEOUS EVERY 8 HOURS
Status: DISCONTINUED | OUTPATIENT
Start: 2021-09-07 | End: 2021-09-10

## 2021-09-07 RX ORDER — LIDOCAINE 40 MG/G
CREAM TOPICAL
Status: DISCONTINUED | OUTPATIENT
Start: 2021-09-07 | End: 2021-09-07 | Stop reason: HOSPADM

## 2021-09-07 RX ORDER — PHENYLEPHRINE HYDROCHLORIDE 10 MG/ML
INJECTION INTRAVENOUS
Status: DISCONTINUED | OUTPATIENT
Start: 2021-09-07 | End: 2021-09-07 | Stop reason: HOSPADM

## 2021-09-07 RX ORDER — ASPIRIN 325 MG
325 TABLET ORAL ONCE
Status: DISCONTINUED | OUTPATIENT
Start: 2021-09-07 | End: 2021-09-07 | Stop reason: HOSPADM

## 2021-09-07 RX ORDER — FENTANYL CITRATE 50 UG/ML
INJECTION, SOLUTION INTRAMUSCULAR; INTRAVENOUS
Status: DISCONTINUED | OUTPATIENT
Start: 2021-09-07 | End: 2021-09-07 | Stop reason: HOSPADM

## 2021-09-07 RX ORDER — ATORVASTATIN CALCIUM 40 MG/1
40 TABLET, FILM COATED ORAL EVERY EVENING
Status: DISCONTINUED | OUTPATIENT
Start: 2021-09-07 | End: 2021-09-17 | Stop reason: HOSPADM

## 2021-09-07 RX ADMIN — DOPAMINE HYDROCHLORIDE 15 MCG/KG/MIN: 160 INJECTION, SOLUTION INTRAVENOUS at 09:22

## 2021-09-07 RX ADMIN — DOPAMINE HYDROCHLORIDE 10 MCG/KG/MIN: 160 INJECTION, SOLUTION INTRAVENOUS at 09:16

## 2021-09-07 RX ADMIN — SODIUM CHLORIDE: 9 INJECTION, SOLUTION INTRAVENOUS at 07:27

## 2021-09-07 RX ADMIN — HEPARIN SODIUM 5000 UNITS: 5000 INJECTION INTRAVENOUS; SUBCUTANEOUS at 21:39

## 2021-09-07 RX ADMIN — SODIUM CHLORIDE 75 ML/HR: 9 INJECTION, SOLUTION INTRAVENOUS at 10:38

## 2021-09-07 RX ADMIN — ATORVASTATIN CALCIUM 40 MG: 40 TABLET, FILM COATED ORAL at 20:36

## 2021-09-07 RX ADMIN — ACETAMINOPHEN 650 MG: 325 TABLET, FILM COATED ORAL at 16:39

## 2021-09-07 RX ADMIN — SODIUM CHLORIDE 2.5 MG/HR: 0.9 INJECTION, SOLUTION INTRAVENOUS at 21:29

## 2021-09-07 ASSESSMENT — ACTIVITIES OF DAILY LIVING (ADL)
ADLS_ACUITY_SCORE: 16

## 2021-09-07 ASSESSMENT — MIFFLIN-ST. JEOR: SCORE: 1716.39

## 2021-09-07 NOTE — PROVIDER NOTIFICATION
DATE:  9/7/2021   TIME OF RECEIPT FROM LAB:  1400  LAB TEST:  Troponin  LAB VALUE:  0.199  RESULTS GIVEN WITH READ-BACK TO (PROVIDER): Paged cards at 1400

## 2021-09-07 NOTE — CONSULTS
Consult Date: 09/07/2021    REFERRING CARDIOLOGIST:  Dr. Loretta Rosario.    REASON FOR CONSULTATION:  Evaluation for coronary artery bypass grafting for newly diagnosed severe diffuse 3-vessel coronary artery disease.    HISTORY OF PRESENT ILLNESS:  Mr. Prasad is a very pleasant 85-year-old gentleman with a known infrarenal AAA for which he was seen by Dr. Adamaris Gottlieb in 2019.  The measurements quite did not quite reach the threshold for surgical/endovascular intervention at that time.  Most recent followup CTA in May; however, demonstrated progression of the AAA size to 5.5 cm in maximal dimension.  He was a preoperative workup for anticipated EVAR was initiated.  According to record he is supposed to see Dr. Adamaris Gottlieb to discuss the findings, but the appointments have been canceled.  A recent nuclear stress test was performed at the end of June demonstrating moderately reduced LV systolic function with EF of 38% at stress, which prompted an outpatient coronary angiogram.  This was performed this morning with Dr. Rosario, demonstrating diffuse severe 3-vessel coronary artery disease.  The patient became hypotensive during the procedure and also had a brief run of V-fib upon transfer to the ICU.  We were asked to evaluate the patient for possible coronary artery bypass grafting.    PAST MEDICAL HISTORY:  Significant for infrarenal AAA as described above, basal cell carcinoma of the skin.    PAST SURGICAL HISTORY:  Includes right knee arthroscopic surgery in 1996 and a basal cell carcinoma with possible emergent margin of the face resection in 2002.    ALLERGIES:  NO KNOWN DRUG ALLERGIES.    CURRENT OUTPATIENT MEDICATIONS:  Reviewed in [facility] chart.    FAMILY HISTORY:  Noncontributory.    SOCIAL HISTORY:  He is a former smoker, but quit in 1986.  Does not drink alcohol.  He lives independently.  He does have bad osteoarthritis involving the knees, but he is able to ambulate without assist.    REVIEW OF SYSTEMS:  Denies any  chest pain or shortness of breath.  All the 10-point review of systems are completed and were otherwise negative unless stated above.    PHYSICAL EXAMINATION:    VITAL SIGNS:  Stable.  He is on a low-dose dopamine drip at the moment.  HEENT:  Within normal limits.  NECK:  Supple, no lymphadenopathy.  CARDIOVASCULAR:  regular rate and rhythm, normal S1, S2.  No murmurs.  LUNGS:  Clear bilaterally.  ABDOMEN:  Soft, nontender, nondistended.  EXTREMITIES:  Negative for edema, cyanosis or clubbing.  NEUROLOGIC:  A and O x3 with no focal deficits.    PERTINENT LABORATORY STUDIES:  Coronary angiogram performed this morning demonstrates diffuse 3-vessel coronary artery disease, but there appears to be acceptable surgical targets in the RCA, circumflex and the LAD distributions.  His most recent transthoracic echo was from 09/02/2021 demonstrating LVEF of around 50% with mild to moderate inferolateral hypokinesis, lateral wall hypokinesis, mild aortic stenosis, mild mitral regurgitation and trace tricuspid valve insufficiency.    IMPRESSION AND PLAN:  Mr. Prasad is an 85-year-old gentleman with severe peripheral vascular disease and infrarenal AAA for which she is planned to have elective EVAR by Dr. Adamaris Gottlieb in the near future.  Preoperative workup demonstrated a normal LV function with an abnormal stress test for which he had outpatient coronary angiogram performed today.  This demonstrated severe diffuse 3-vessel coronary artery disease.  Given the extent of his diffuse 3-vessel coronary artery disease, I do think that surgical revascularization would be a best option.  We will have to complete his preoperative workup, however, and I think this should be done in a more elective setting.  I think his hemodynamic instability and V-fib from today was due to the procedural related and potentially due to dopamine infusion and not from ongoing myocardial ischemia.  The patient will be watched closely in the ICU.  Briefly  discussed with the patient and with the daughter that angiographic findings and the reason for likely recommending surgical revascularization in the form of coronary artery bypass grafting.  We will obtain a carotid ultrasound and a noncontrast chest CT scan to complete the workup and also get a lower extremity vein mapping to assess his saphenous vein for conduit use.  Once these studies are completed, I will talk with the patient and daughter for our final surgical recommendations and timing and his estimated mortality risk of coronary artery bypass grafting.    Gordy Miller MD        D: 2021   T: 2021   MT: [email or web address]    Name:     JUANITO CANDELARIAAdán  MRN:      [id number]        Account:      [id number]   :      1935           Consult Date: 2021     Document: [id number]

## 2021-09-07 NOTE — CONSULTS
Cardiology Consult Note  [facility]          Assessment and Plan:   #1 AAA needing EVAR (5.5 cm)  #2 Severe multi vessel CAD (RCA, LAD Circ)  #3 Renal insufficiency with creatinine 1.3  #4 Hypertension  #5 Hyperlipidemia    Severe multi-vessel coronary artery disease with RICARDO 2 flow in the LAD. He became unstable with coronary angiogram requiring pressor support and was admitted to the ICU.  Discussed with the care team including Dr. Miller, Dr. Cuadra, Dr. Sinclair and the ICU attending. Work-up for possible bypass surgery will be initiated as an in-patient.  Will start on cardiac goal-directed meds.     DEEPIKA Rosario MD           History:   Mr. Prasad is an 85 year old male recently seen by Dr. Sinclair for pre-operative evaluation for AAA needing EVAR (5.5 cm).  He has untreated hyperlipidemia and hypertension not on medications historically as he does not like them.  He has a family history of AAA in a brother.  The patient is not a smoker.  He was seen as his lexiscan 6/30/21 showed EF 52% with small area of ischemia but significant TID and a drop in EF with stress to 38% which is indicative of possible left main coronary disease or multivessel coronary disease. Dr. Sinclair recommended coronary angiogram.    Patient denied chest pressure or pain.               Medications:     sodium chloride 0.9%, acetaminophen, atropine, fentaNYL, flumazenil, HOLD MEDICATION, midazolam, naloxone **OR** naloxone **OR** naloxone **OR** naloxone, oxyCODONE **OR** oxyCODONE          PAST MEDICAL HISTORY  Past Medical History        Past Medical History:   Diagnosis Date     Basal cell carcinoma       Diverticula of colon       Respiratory complications              CURRENT MEDICATIONS  Current Outpatient Prescriptions          Current Outpatient Medications   Medication Sig Dispense Refill     ASPIRIN 325 MG PO TBEC Takes once in awhile   3            PAST SURGICAL HISTORY:  Past Surgical History          Past Surgical History:   Procedure Laterality Date     ARTHROSCOPY OF JOINT UNLISTED         right knee about      C APPENDECTOMY         SURGICAL HISTORY OF -    02     Basal cell carcinoma w/positive margins, right  eyebrow & eyelid            ALLERGIES   No Known Allergies     FAMILY HISTORY  Family History         Family History   Problem Relation Age of Onset     Cancer Mother           lung cancer     C.A.D. Father           uncertain.  age 79     Unknown/Adopted Maternal Grandmother       Unknown/Adopted Maternal Grandfather       Unknown/Adopted Paternal Grandmother       Unknown/Adopted Paternal Grandfather       Diabetes Brother           Dre     C.A.D. Brother           Dre  age 64               SOCIAL HISTORY  Social History   Social History            Socioeconomic History     Marital status:        Spouse name: Not on file     Number of children: Not on file     Years of education: Not on file     Highest education level: Not on file   Occupational History     Occupation: contracter       Employer: SELF   Tobacco Use     Smoking status: Former Smoker       Types: Cigarettes       Quit date: 1986       Years since quittin.6     Smokeless tobacco: Never Used   Substance and Sexual Activity     Alcohol use: No     Drug use: No     Sexual activity: Never   Other Topics Concern     Parent/sibling w/ CABG, MI or angioplasty before 65F 55M? Not Asked   Social History Narrative     Not on file      Social Determinants of Health          Financial Resource Strain:      Difficulty of Paying Living Expenses:    Food Insecurity:      Worried About Running Out of Food in the Last Year:      Ran Out of Food in the Last Year:    Transportation Needs:      Lack of Transportation (Medical):      Lack of Transportation (Non-Medical):    Physical Activity:      Days of Exercise per Week:      Minutes of Exercise per Session:    Stress:      Feeling of Stress :    Social  "Connections:      Frequency of Communication with Friends and Family:      Frequency of Social Gatherings with Friends and Family:      Attends Quaker Services:      Active Member of Clubs or Organizations:      Attends Club or Organization Meetings:      Marital Status:    Intimate Partner Violence:      Fear of Current or Ex-Partner:      Emotionally Abused:      Physically Abused:      Sexually Abused:             ROS:   Constitutional: No fever, chills, or sweats. No weight gain/loss   ENT: No visual disturbance, ear ache, epistaxis, sore throat  Allergies/Immunologic: Negative  Respiratory: No cough, hemoptysia  Cardiovascular: As per HPI  GI: No nausea, vomiting, hematemesis, melena, or hematochezia  : No urinary frequency, dysuria, or hematuria  Integument: Negative  Psychiatric: Negative  Neuro: Negative  Endocrinology: Negative   Musculoskeletal: Negative  Vascular: No walking impairment, claudication, ischemic rest pain or nonhealing wounds                   Physical Exam:   Blood pressure 114/71, pulse 84, temperature 97.9  F (36.6  C), temperature source Oral, resp. rate 18, height 1.803 m (5' 11\"), weight 100.4 kg (221 lb 6.4 oz), SpO2 99 %.  Wt Readings from Last 4 Encounters:   21 100.4 kg (221 lb 6.4 oz)   21 100.4 kg (221 lb 6.4 oz)   21 98.9 kg (218 lb)   19 98.9 kg (218 lb)         Vital Sign Ranges  Temperature Temp  Av.9  F (36.6  C)  Min: 97.9  F (36.6  C)  Max: 97.9  F (36.6  C)   Blood pressure Systolic (24hrs), Av , Min:114 , Max:159        Diastolic (24hrs), Av, Min:71, Max:97      Pulse Pulse  Av  Min: 84  Max: 84   Respirations Resp  Av  Min: 18  Max: 18   Pulse oximetry SpO2  Av %  Min: 99 %  Max: 99 %       No intake or output data in the 24 hours ending 21 1009    Constitutional: Awake, alert, cooperative, no apparent distress   Lungs: Clear to auscultation bilaterally, no crackles or wheezing   Cardiovascular: Regular " rate and rhythm, normal S1 and S2, and no murmur noted   Abdomen: Normal bowel sounds, soft, non-distended, non-tender   Skin: No rashes, no cyanosis, no edema   Other:           Data:     Recent Labs   Lab Test 09/07/21  0710   WBC 8.3   HGB 15.3   MCV 95      INR 1.05      Recent Labs   Lab Test 09/07/21  0710 06/06/17  1403    140   POTASSIUM 4.3 4.4   CHLORIDE 111* 108   CO2 26 23   BUN 23 17   CR 1.36* 1.03   ANIONGAP 2* 9   ARELI 9.1 9.6   GLC 92 89         Loretta Rosario MD

## 2021-09-07 NOTE — PROCEDURES
[facility]    Arterial line placement    Date/Time: 9/7/2021 12:09 PM  Performed by: Freda Campos MD  Authorized by: Freda Campos MD     UNIVERSAL PROTOCOL   Site Marked: Yes  Prior Images Obtained and Reviewed:  Yes  Required items: Required blood products, implants, devices and special equipment available    Patient identity confirmed:  Verbally with patient and arm band  Patient was reevaluated immediately before administering moderate or deep sedation or anesthesia  Confirmation Checklist:  Patient's identity using two indicators  Time out: Immediately prior to the procedure a time out was called    Universal Protocol: the Joint Commission Universal Protocol was followed    Preparation: Patient was prepped and draped in usual sterile fashion        Indication: multiple ABGs hemodynamic monitoring  Location: right radial       ANESTHESIA    Local Anesthetic: Lidocaine 1% without epinephrine  Anesthetic Total (mL):  2      SEDATION    Patient Sedated: No      PROCEDURE DETAILS  Hugh's Test Normal?: Hugh's test not abnormal  Needle Gauge:  20  Seldinger technique: Seldinger technique used    Number of Attempts:  1  Post-procedure:  Line sutured and dressing applied  CMS: normal  PROCEDURE   Length of time physician/provider present for 1:1 monitoring during sedation: 10

## 2021-09-07 NOTE — H&P
"Critical Care  Note      09/07/2021    Name: Dragan Prasad MRN#: [id number]   Age: 85 year old YOB: 1935     [facility]     [facility] DAY # 0             Problem List:   Active Problems:    Abdominal aortic aneurysm (H)    Encounter for pre-operative cardiovascular clearance    Abnormal cardiovascular stress test    Unstable angina pectoris due to coronary arteriosclerosis (H)           Summary/Hospital Course:     Dragan Prasad is a 85 year old male who presents for a coronary angiogram as part of pre-op eval for EVAR.       He was seen in cardiology clinic 8/31 and reported that he was doing relatively well, but sometimes felt \"off\" during his regular exercise regimen. Denied chst pain or dyspnea or edema at that time.  He had stopped his antihypertensive medication and was taking asa intermittently.  Lexicsan stress test on 6/30 showed TID, EF dropped from 52 to 38%, mild LVE and ischemia of distal ant/apex and mid to basal inferior and inferolateral segment(s).     He was referred for a cor angio, started on BB, asa and PRN nitroglycerin. Apparently he was hoping to have AAA repair in [place] where he lives part-time. He was seen by Dr. Angeles at [facility] and was planning for EVAR.      His diagnostic angio showed 3 vessel CAD. He developed severe hypotension SBP 40s and required dopamine drip and phenylephrine boluses, but did stabilize on dopamine drip 5mcg prior to transfer to ICU. In the elevator he had vfib and was shocked once back into sinus rhythm. In the ICU he is somnolent, comfortable, reports he is pain-free and no dyspnea lying flat.      Dopamine weaned off and discontinued. BP maintained MAP > 65      Assessment and plan :     Dragan Prasad is a 85 year old male admitted on 9/7/2021 for Hypotension during CA ?cardiogenic.  I have personally reviewed the daily labs, imaging studies, cultures and discussed the case with referring physician and consulting physicians. "     My assessment and plan by system for this patient is as follows:    Neurology/Psychiatry:   # pain free  # Anxiety    Plan  Analgesia PRN     Cardiovascular:   #Hemodynamics - transient hypotension due to ?cardiogenic vs medications related, improved on dopamine ggt which is discontinued.  #Rhythm - run of V fib en route to ICU ?dopamine related, shocked to sinus rhythm.   # severe 3 vessel CAD.   # HTN  # HLD.  #ECHO 9/2: EF 45-50% There is mild to moderate inferolateral wall hypokinesis. There is borderline global hypokinesia of the left ventricle.  Plan  CV surgery consult for CABG (seen)   Tele  A-line inserted for hemodynamic monitoring.  Atorvastatin 40 mg PO      Pulmonary/Ventilator Management:   No issues, maintains saturation on 2 L NC  Plan  - continue monitoring     GI and Nutrition :   No prior Hx.  Plan  - GI Prophylaxis: PPI  - Start diet as tolerated.     Renal/Fluids/Electrolytes:   1. mild renal insufficiency cr 1.3 good UOP.  2. No Electrolyte abnormality     Plan  - monitor function and electrolytes as needed with replacement per ICU protocols. - generally avoid nephrotoxic agents such as NSAID, IV contrast unless specifically required  - adjust medications as needed for renal clearance  - follow I/O's as appropriate.    Infectious Disease:   No signs of sepsis.    Endocrine:   # Stress induced hyperglycemia  Plan  - ICU insulin protocol, goal sugar <180      Hematology/Oncology:   .No issues      IV/Access:   1. Venous access -   2. Arterial access - A-line  3.  Plan  - central access required and necessary      ICU Prophylaxis:   1. DVT: Hep Subq/mechanical  2. VAP: HOB 30 degrees, chlorhexidine rinse  3. Stress Ulcer: PPI  4. Restraints: Nonviolent soft two point restraints required and necessary for patient safety and continued cares and good effect as patient continues to pull at necessary lines, tubes despite education and distraction. Will readdress daily.   5. Wound care  - as per  unit protocol   6. Feeding - Diet as tolerated  7. Family Update: yes daughter bedside   8. Disposition - ICU        Key goals for next 24 hours:   1. Complete cardiac workup  2. Tele           Medical History:     Past Medical History:   Diagnosis Date     Basal cell carcinoma      Diverticula of colon      Respiratory complications      Past Surgical History:   Procedure Laterality Date     ARTHROSCOPY OF JOINT UNLISTED      right knee about      C APPENDECTOMY       SURGICAL HISTORY OF -   02    Basal cell carcinoma w/positive margins, right  eyebrow & eyelid     Social History     Socioeconomic History     Marital status:      Spouse name: Not on file     Number of children: Not on file     Years of education: Not on file     Highest education level: Not on file   Occupational History     Occupation: contracter     Employer: SELF   Tobacco Use     Smoking status: Former Smoker     Types: Cigarettes     Quit date: 1986     Years since quittin.7     Smokeless tobacco: Never Used   Substance and Sexual Activity     Alcohol use: No     Drug use: No     Sexual activity: Never   Other Topics Concern     Parent/sibling w/ CABG, MI or angioplasty before 65F 55M? Not Asked   Social History Narrative     Not on file     Social Determinants of Health     Financial Resource Strain:      Difficulty of Paying Living Expenses:    Food Insecurity:      Worried About Running Out of Food in the Last Year:      Ran Out of Food in the Last Year:    Transportation Needs:      Lack of Transportation (Medical):      Lack of Transportation (Non-Medical):    Physical Activity:      Days of Exercise per Week:      Minutes of Exercise per Session:    Stress:      Feeling of Stress :    Social Connections:      Frequency of Communication with Friends and Family:      Frequency of Social Gatherings with Friends and Family:      Attends Judaism Services:      Active Member of Clubs or Organizations:      Attends  Club or Organization Meetings:      Marital Status:    Intimate Partner Violence:      Fear of Current or Ex-Partner:      Emotionally Abused:      Physically Abused:      Sexually Abused:       No Known Allergies           Key Medications:       atorvastatin  40 mg Oral QPM       DOPamine Stopped (09/07/21 1043)     norepinephrine       sodium chloride 75 mL/hr (09/07/21 1038)        Home Meds  No current facility-administered medications on file prior to encounter.  ASPIRIN 325 MG PO TBEC, Takes once in awhile  metoprolol succinate ER (TOPROL-XL) 25 MG 24 hr tablet, Take 1 tablet (25 mg) by mouth daily               Physical Examination:   Temp:  [97.9  F (36.6  C)-99.7  F (37.6  C)] 99.7  F (37.6  C)  Pulse:  [71-84] 75  Resp:  [9-18] 13  BP: ()/(67-97) 108/73  MAP:  [75 mmHg-77 mmHg] 77 mmHg  Arterial Line BP: (112-116)/(55-57) 116/57  SpO2:  [94 %-99 %] 94 %    Intake/Output Summary (Last 24 hours) at 9/7/2021 1153  Last data filed at 9/7/2021 1000  Gross per 24 hour   Intake 382.5 ml   Output --   Net 382.5 ml     Wt Readings from Last 4 Encounters:   09/07/21 100.4 kg (221 lb 6.4 oz)   08/31/21 100.4 kg (221 lb 6.4 oz)   06/30/21 98.9 kg (218 lb)   05/29/19 98.9 kg (218 lb)     Arterial Line BP: (112-116)/(55-57) 116/57  MAP:  [75 mmHg-77 mmHg] 77 mmHg  BP - Mean:  [75-90] 87  Resp: 13    No lab results found in last 7 days.    GEN: no acute distress   HEENT: head ncat, sclera anicteric, OP patent, trachea midline   PULM:  clear anteriorly    CV/COR: RRR S1S2 no gallop,  No rub, no murmur  ABD: soft nontender, hypoactive bowel sounds, no mass  EXT: -ve Edema   warm  NEURO: grossly intact  SKIN: no obvious rash  LINES: clean, dry intact         Data:   All data and imaging reviewed     ROUTINE ICU LABS (Last four results)  CMP  Recent Labs   Lab 09/07/21  1004 09/07/21  0710   NA  --  139   POTASSIUM  --  4.3   CHLORIDE  --  111*   CO2  --  26   ANIONGAP  --  2*   * 92   BUN  --  23   CR  --   1.36*   GFRESTIMATED  --  47*   ARELI  --  9.1     CBC  Recent Labs   Lab 09/07/21  0710   WBC 8.3   RBC 5.05   HGB 15.3   HCT 47.7   MCV 95   MCH 30.3   MCHC 32.1   RDW 12.5        INR  Recent Labs   Lab 09/07/21  0710   INR 1.05     Arterial Blood GasNo lab results found in last 7 days.    All cultures:  No results for input(s): CULT in the last 168 hours.  Recent Results (from the past 24 hour(s))   Cardiac Catheterization    Narrative    1) Severe multi-vessel coronary artery disease  2) Consider LIMA-LAD, Vein-Circumflex, Vein-RCA         Billing: This patient is critically ill: Yes. Total critical care time today 30 min.

## 2021-09-07 NOTE — PLAN OF CARE
Pt arrived to ICU bed 359 at 1000 following a left heart cath. Per report, pt had a v. fib. arrest en route to ICU with return to NSR following 1 shock. Pt on 5 mcg/kg/min dopamine on arrival. Arterial line placed, dopamine titrated off. Dr. Miller at bedside and imaging/planning for CABG in process.   Evelin Navarro RN on 9/7/2021 at 3:33 PM    1820: discussed intermittent hypertension, -160, max 180 in setting of AAA. Received order for nicardipine gtt to keep SBP<140

## 2021-09-07 NOTE — CONSULTS
"[facility]    Cardiology Consultation     Date of Admission:  9/7/2021    Assessment & Plan     1. Severe 3 vessel CAD. Severe stenosis of LAD, LCX RCA.  Vessels appear suitable for CABG.  CV surgery seeing the patient.   2. Mild ischemic cardiomyopathy EF 50%  3. Hemodynamic instability and vfib requiring shock x 1 post diagnostic coronary angiogram.   a. Weaning dopamine. BP now stable  4. Infrarenal AAA 5.5 x 4.9 w/intramural thrombus. He was undergoing pre-op eval for planned endovascular repair of AAA with Dr. Angeles ([facility])  5. CKD 3  6. Dyslipidemia, recently initiated on statin  7. HTN  8. Mild aortic stenosis.     Recs:  1. Wean/discontinue dopamine if BP stable  2. Repeat EKG  3. Do not resume metoprolol at this time.  4. Atorvastatin 40mg PO daily ordered.  5. ICU is placing an arterial line for hemo monitoring.  6. Discussed with CV surgery.  He will undergo standard pre-op testing and if stable anticipate semi-elective CABG during this inpatient stay.  If he is not hemodynamically stable, has recurrent arrhythmia, chest pain, or ischemic EKG changes would anticipate OR more urgently.   7. EVAR will be deferred until after CV surgery.       Flora Cuadra MD    Code Status    No Order    Reason for Consult   Reason for consult: CAD, vfib, shock        Chief Complaint     CAD, AAA, CHF, ventricular fibrillation    History of Present Illness   Dragan Prasad is a 85 year old male who presents for a coronary angiogram as part of pre-op eval for EVAR.      He was seen in cardiology clinic 8/31 and reported that he was doing relatively well, but sometimes felt \"off\" during his regular exercise regimen. Denied chst pain or dyspnea or edema at that time.  He had stopped his antihypertensive medication and was taking asa intermittently.  Lexicsan stress test on 6/30 showed TID, EF dropped from 52 to 38%, mild LVE and ischemia of distal ant/apex and mid to basal inferior and " inferolateral segment(s).    He was referred for a cor angio, started on BB, asa and PRN nitroglycerin. Apparently he was hoping to have AAA repair in [place] where he lives part-time. He was seen by Dr. Angeles at [facility] and was planning for EVAR.     His diagnostic angio showed 3 vessel CAD. He developed severe hypotension SBP 40s and required dopamine drip and phenylephrine boluses, but did stabilize on dopamine drip 5mcg prior to transfer to ICU. In the elevator he had vfib and was shocked once back into sinus rhythm. In the ICU he is somnolent, comfortable, reports he is pain-free and no dyspnea lying flat.  Family at bedside as well as CV surgery and ICU team. BP labile (SBP 90s to 160s). Art line being placed. Dopamine down to 2.5mcg.    Past Medical History   Did not see MDs regularly  Untreated HTN  HLD  AAA      Prior to Admission Medications   Prior to Admission Medications   Prescriptions Last Dose Informant Patient Reported? Taking?   ASPIRIN 325 MG PO TBEC 9/7/2021 at 0600  Yes Yes   Sig: Takes once in awhile   metoprolol succinate ER (TOPROL-XL) 25 MG 24 hr tablet 9/7/2021 at 0600  No Yes   Sig: Take 1 tablet (25 mg) by mouth daily      Facility-Administered Medications Last Administration Doses Remaining   nitroGLYcerin (NITROSTAT) sublingual tablet 0.4 mg None recorded         Allergies   No Known Allergies    Social History   Nonsmoker    Family History   Not reviewed with patient as he is patient undergoing transfer to ICU, but notes indicate  Brother had AAA.     Review of Systems   The 10 point Review of Systems is negative other than noted in the HPI or here. He states he has been feeling well, but seems lethargic post-sedation and defibrillation.     Physical Exam   Temp: 97.9  F (36.6  C) Temp src: Oral BP: (!) 159/97     Resp: 18 SpO2: 99 % O2 Device: Nasal cannula Oxygen Delivery: 2 LPM  Vital Signs with Ranges  Temp:  [97.9  F (36.6  C)] 97.9  F (36.6  C)  Resp:  [18] 18  BP:  (159)/(97) 159/97  SpO2:  [99 %] 99 %  221 lbs 6.39 oz    Constitutional: nondistressed  Eyes: clear sclear  ENT: Mucous membranes are moist  Respiratory: clear lungs anterior/lateral fields  Cardiovascular: regular, no murmur appreciated, no rub or gallop  GI: soft, nontender bowel sounds   Skin: trace bilateral pretibial edemaedema  Musculoskeletal: grossly normal muscle b  Neurologic: lethargic but awake, moves all extremities  Psychiatric: somnolent but grossly normal affect    Data   I personally reviewed the following studies  Angiogram: 3 vessel CAD. Diagnostic only. Patient developed significant hypotension after contrast administration requring dopamine drip and phenylephrine boluses.     EKG pre-angio today: sinus with first degree AV block, low voltage.     CT abd pelvis 5/25/21  IMPRESSION:  1. Infrarenal abdominal aortic aneurysm measuring up to 5.5 cm AP by  4.9 cm transverse, increased from previous 5.4 cm AP x 4.6 cm  transverse. Large amount of intramural thrombus. Anatomy would be  suitable for endovascular repair if applicable.  2. 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  3. Mild stenosis of the celiac axis, though perhaps due to extrinsic  compression. This is unchanged from previous exam.  4. Diffuse colonic diverticulosis as well as left renal hypodensities  unchanged. Enlargement of the prostate gland unchanged.    Echo 9/2/21  There is borderline concentric left ventricular hypertrophy.  Mildly decreased left ventricular systolic function  The visual ejection fraction is estimated at 47-51%.  There is mild lateral wall hypokinesis.  There is mild to moderate inferolateral wall hypokinesis.  Aortic valve not well seen but it is abnormal with thickening (cannot see  individual cusps well). Mild aortic valve stenosis       Nuc results: see HPI    Addendum:  Clinically Significant Risk Factors Present on Admission     Cardiovascular: Cardiac  arrest  Cardiac Arrhythmia: Ventricular fibrillation  Shock: Cardiogenic shock    Nephrology: CKD POA List: Stage 3a (GFR 45-59)

## 2021-09-07 NOTE — PROGRESS NOTES
Care Suites Admission Nursing Note    Patient Information  Name: Dragan Prasad  Age: 85 year old  Reason for admission: left heart cath  Care Suites arrival time: 0700    Visitor Information  Name: Bina  Informed of visitor restrictions: Yes  1 visitor allowed per patient   Visitor must screen negative for COVID symptoms   Visitor must wear a mask  Waiting rooms closed to visitors    Patient Admission/Assessment   Pre-procedure assessment complete: Yes  If abnormal assessment/labs, provider notified: No  NPO: Yes  Medications held per instructions/orders: Yes  Consent: obtained  If applicable, pregnancy test status: declined  Patient oriented to room: Yes  Education/questions answered: Yes  Plan/other: Review labs, obtain consent and proceed with scheduled treatment or intervention      Discharge Planning  Discharge name/phone number: Bina - daughter - [phone]  Overnight post sedation caregiver: above  Discharge location: [place]    Cedric Tang RN         PATIENT/VISITOR WELLNESS SCREENING    Step 1 Patient Screening    1. In the last month, have you been in contact with someone who was confirmed or suspected to have Coronavirus/COVID-19? No    2. Do you have the following symptoms?  Fever/Chills? No   Cough? No   Shortness of breath? No   New loss of taste or smell? No  Sore throat? No  Muscle or body aches? No  Headaches? No  Fatigue? No  Vomiting or diarrhea? No    Step 2 Visitor Screening    1. Name of Visitor (1 visitor per patient): Bina    2. In the last month, have you been in contact with someone who was confirmed or suspected to have Coronavirus/COVID-19? No    3. Do you have the following symptoms?  Fever/Chills? No   Cough? No   Shortness of breath? No   Skin rash? No   Loss of taste or smell? No  Sore throat? No  Runny or stuffy nose? No  Muscle or body aches? No  Headaches? No  Fatigue? No  Vomiting or diarrhea? No

## 2021-09-08 PROBLEM — I10 BENIGN ESSENTIAL HYPERTENSION: Status: ACTIVE | Noted: 2021-09-08

## 2021-09-08 LAB
ALBUMIN UR-MCNC: NEGATIVE MG/DL
ANION GAP SERPL CALCULATED.3IONS-SCNC: 3 MMOL/L (ref 3–14)
APPEARANCE UR: CLEAR
BILIRUB UR QL STRIP: NEGATIVE
BUN SERPL-MCNC: 19 MG/DL (ref 7–30)
CALCIUM SERPL-MCNC: 8.7 MG/DL (ref 8.5–10.1)
CHLORIDE BLD-SCNC: 114 MMOL/L (ref 94–109)
CO2 SERPL-SCNC: 23 MMOL/L (ref 20–32)
COLOR UR AUTO: ABNORMAL
CREAT SERPL-MCNC: 1.19 MG/DL (ref 0.66–1.25)
ERYTHROCYTE [DISTWIDTH] IN BLOOD BY AUTOMATED COUNT: 12.6 % (ref 10–15)
GFR SERPL CREATININE-BSD FRML MDRD: 56 ML/MIN/1.73M2
GLUCOSE BLD-MCNC: 142 MG/DL (ref 70–99)
GLUCOSE BLDC GLUCOMTR-MCNC: 111 MG/DL (ref 70–99)
GLUCOSE BLDC GLUCOMTR-MCNC: 114 MG/DL (ref 70–99)
GLUCOSE BLDC GLUCOMTR-MCNC: 72 MG/DL (ref 70–99)
GLUCOSE BLDC GLUCOMTR-MCNC: 95 MG/DL (ref 70–99)
GLUCOSE UR STRIP-MCNC: NEGATIVE MG/DL
HCT VFR BLD AUTO: 45.4 % (ref 40–53)
HGB BLD-MCNC: 14.9 G/DL (ref 13.3–17.7)
HGB UR QL STRIP: NEGATIVE
KETONES UR STRIP-MCNC: NEGATIVE MG/DL
LEUKOCYTE ESTERASE UR QL STRIP: NEGATIVE
MCH RBC QN AUTO: 31 PG (ref 26.5–33)
MCHC RBC AUTO-ENTMCNC: 32.8 G/DL (ref 31.5–36.5)
MCV RBC AUTO: 94 FL (ref 78–100)
MUCOUS THREADS #/AREA URNS LPF: PRESENT /LPF
NITRATE UR QL: NEGATIVE
PH UR STRIP: 5.5 [PH] (ref 5–7)
PLATELET # BLD AUTO: 232 10E3/UL (ref 150–450)
POTASSIUM BLD-SCNC: 4.4 MMOL/L (ref 3.4–5.3)
RBC # BLD AUTO: 4.81 10E6/UL (ref 4.4–5.9)
RBC URINE: 1 /HPF
SODIUM SERPL-SCNC: 140 MMOL/L (ref 133–144)
SP GR UR STRIP: 1.02 (ref 1–1.03)
UROBILINOGEN UR STRIP-MCNC: NORMAL MG/DL
WBC # BLD AUTO: 7.6 10E3/UL (ref 4–11)
WBC URINE: <1 /HPF

## 2021-09-08 PROCEDURE — 85027 COMPLETE CBC AUTOMATED: CPT | Performed by: INTERNAL MEDICINE

## 2021-09-08 PROCEDURE — 250N000011 HC RX IP 250 OP 636: Performed by: STUDENT IN AN ORGANIZED HEALTH CARE EDUCATION/TRAINING PROGRAM

## 2021-09-08 PROCEDURE — C9113 INJ PANTOPRAZOLE SODIUM, VIA: HCPCS | Performed by: STUDENT IN AN ORGANIZED HEALTH CARE EDUCATION/TRAINING PROGRAM

## 2021-09-08 PROCEDURE — 99233 SBSQ HOSP IP/OBS HIGH 50: CPT | Performed by: INTERNAL MEDICINE

## 2021-09-08 PROCEDURE — 99232 SBSQ HOSP IP/OBS MODERATE 35: CPT | Performed by: STUDENT IN AN ORGANIZED HEALTH CARE EDUCATION/TRAINING PROGRAM

## 2021-09-08 PROCEDURE — 81001 URINALYSIS AUTO W/SCOPE: CPT | Performed by: SURGERY

## 2021-09-08 PROCEDURE — 250N000013 HC RX MED GY IP 250 OP 250 PS 637: Performed by: INTERNAL MEDICINE

## 2021-09-08 PROCEDURE — 250N000013 HC RX MED GY IP 250 OP 250 PS 637: Performed by: STUDENT IN AN ORGANIZED HEALTH CARE EDUCATION/TRAINING PROGRAM

## 2021-09-08 PROCEDURE — 200N000001 HC R&B ICU

## 2021-09-08 PROCEDURE — 80048 BASIC METABOLIC PNL TOTAL CA: CPT | Performed by: INTERNAL MEDICINE

## 2021-09-08 RX ORDER — AMLODIPINE BESYLATE 5 MG/1
5 TABLET ORAL DAILY
Status: DISCONTINUED | OUTPATIENT
Start: 2021-09-08 | End: 2021-09-10

## 2021-09-08 RX ORDER — CARVEDILOL 6.25 MG/1
6.25 TABLET ORAL 2 TIMES DAILY WITH MEALS
Status: DISCONTINUED | OUTPATIENT
Start: 2021-09-08 | End: 2021-09-09

## 2021-09-08 RX ORDER — CARVEDILOL 3.12 MG/1
3.12 TABLET ORAL 2 TIMES DAILY WITH MEALS
Status: DISCONTINUED | OUTPATIENT
Start: 2021-09-08 | End: 2021-09-08

## 2021-09-08 RX ORDER — ASPIRIN 81 MG/1
81 TABLET, CHEWABLE ORAL DAILY
Status: DISCONTINUED | OUTPATIENT
Start: 2021-09-08 | End: 2021-09-10

## 2021-09-08 RX ADMIN — AMLODIPINE BESYLATE 5 MG: 5 TABLET ORAL at 15:07

## 2021-09-08 RX ADMIN — ATORVASTATIN CALCIUM 40 MG: 40 TABLET, FILM COATED ORAL at 20:00

## 2021-09-08 RX ADMIN — CARVEDILOL 6.25 MG: 6.25 TABLET, FILM COATED ORAL at 17:06

## 2021-09-08 RX ADMIN — HEPARIN SODIUM 5000 UNITS: 5000 INJECTION INTRAVENOUS; SUBCUTANEOUS at 22:51

## 2021-09-08 RX ADMIN — PANTOPRAZOLE SODIUM 40 MG: 40 INJECTION, POWDER, FOR SOLUTION INTRAVENOUS at 08:40

## 2021-09-08 RX ADMIN — HEPARIN SODIUM 5000 UNITS: 5000 INJECTION INTRAVENOUS; SUBCUTANEOUS at 06:03

## 2021-09-08 RX ADMIN — SENNOSIDES AND DOCUSATE SODIUM 1 TABLET: 8.6; 5 TABLET ORAL at 17:06

## 2021-09-08 RX ADMIN — ASPIRIN 81 MG CHEWABLE TABLET 81 MG: 81 TABLET CHEWABLE at 09:54

## 2021-09-08 RX ADMIN — CARVEDILOL 3.12 MG: 3.12 TABLET, FILM COATED ORAL at 09:54

## 2021-09-08 RX ADMIN — HEPARIN SODIUM 5000 UNITS: 5000 INJECTION INTRAVENOUS; SUBCUTANEOUS at 13:29

## 2021-09-08 ASSESSMENT — ACTIVITIES OF DAILY LIVING (ADL)
ADLS_ACUITY_SCORE: 16

## 2021-09-08 ASSESSMENT — MIFFLIN-ST. JEOR: SCORE: 1680.13

## 2021-09-08 NOTE — PROGRESS NOTES
[facility]    Cardiology Progress Note     Assessment & Plan     1. Severe three-vessel CAD of the LAD, circumflex and right coronary artery  2. Abdominal aortic aneurysm, 5.5 cm.  Plan for EVAR, this will be deferred until after CABG.  3. CKD stage III  4. Hypertension.   5. Dyslipidemia  6. Aortic stenosis    -Start carvedilol 3.125 mg p.o. twice daily  -Wean off nicardipine drip.  Optimal systolic blood pressure less than 120mmHg.  -Ordered basic metabolic panel and CBC for today  -Daily aspirin  -Continue heparin  -Continue statin  -Timing for CABG per CV surgery    Flora Cuadra MD    Interval History   Feeling good.  Hypertensive overnight mildly bradycardic on nicardipine drip with first-degree AV block.  No chest pain or dyspnea.  No significant arrhythmias.  Did not sleep well in the ICU, but otherwise no complaints.    CV surgery is tentatively planning to take him to the OR tomorrow.  Physical Exam   Temp: 97.6  F (36.4  C) Temp src: Oral BP: 134/76 Pulse: 68   Resp: 18 SpO2: 93 % O2 Device: None (Room air) Oxygen Delivery: 5 LPM  Vitals:    09/07/21 0641 09/08/21 0000   Weight: 100.4 kg (221 lb 6.4 oz) 96.8 kg (213 lb 6.5 oz)     Vital Signs with Ranges  Temp:  [97.6  F (36.4  C)-99.7  F (37.6  C)] 97.6  F (36.4  C)  Pulse:  [51-88] 68  Resp:  [8-32] 18  BP: ()/(67-99) 134/76  MAP:  [64 mmHg-147 mmHg] 87 mmHg  Arterial Line BP: ()/() 132/46  SpO2:  [90 %-99 %] 93 %  I/O last 3 completed shifts:  In: 1647.91 [P.O.:860; I.V.:787.91]  Out: 1550 [Urine:1550]  Patient Active Problem List   Diagnosis     Elevated blood pressure reading without diagnosis of hypertension     Family history of other cardiovascular diseases     Thoracic or lumbosacral neuritis or radiculitis, unspecified     Abdominal pain, right upper quadrant     Abdominal aortic aneurysm (H)     Benign neoplasm of skin     CAD (coronary artery disease)     CARDIOVASCULAR SCREENING; LDL GOAL LESS  THAN 100     Encounter for pre-operative cardiovascular clearance     Abnormal cardiovascular stress test     Unstable angina pectoris due to coronary arteriosclerosis (H)       Constitutional: Nondistressed, appears comfortable.  Sitting up in bed  Eyes: Clear sclera  ENT: Moist mucous membranes  Respiratory: Soft crackles at bilateral bases which cleared with deep inspiration and cough  Cardiovascular: Regular rate and rhythm no murmur rub or gallop  GI: Soft  Skin: No edema  Neurologic: Alert  Psychiatric: Normal affect    Medications     niCARdipine 5 mg/hr (09/08/21 0729)       atorvastatin  40 mg Oral QPM     carvedilol  3.125 mg Oral BID w/meals     heparin ANTICOAGULANT  5,000 Units Subcutaneous Q8H     pantoprazole (PROTONIX) IV  40 mg Intravenous Daily with breakfast       Data    I reviewed all labs, meds, telemetry and imaging.  Carotid ultrasound shows mild bilateral carotid stenosis    Noncontrast chest CT: No porcelain aorta.  Small up to 3 mm pulmonary nodules

## 2021-09-08 NOTE — PLAN OF CARE
Neuro: A&Ox4, all neuros intact    CV: tele SB with 1st-degree AV block, nicardipine infusing to keep SBP < 140    Resp: LS clear on room air    GI: BS active, BM yesterday, diet regular    : adequate urine output    Skin: skin WDL    Additional: plan to finish CV workup studies then Dr. Miller will have discussion with pt and daughter regarding surgical decision, timing, etc.

## 2021-09-08 NOTE — PROGRESS NOTES
[facility]    Medicine Progress Note - Hospitalist Service       Date of Admission:  9/7/2021    Assessment & Plan             CAD (coronary artery disease)    Abnormal cardiovascular stress test    Unstable angina pectoris due to coronary arteriosclerosis (H)    Assessment: patient had a diagnostic angio 09/07 that showed 3 vessel CAD. Subsequently he developed severe hypotension SBP 40s and required dopamine drip and phenylephrine boluses, but did stabilize on dopamine drip 5mcg and was transfered to ICU. In the elevator he had vfib and was shocked once back into sinus rhythm. Now HD stable    Plan:   - Plan for CABG on Friday pending OR availability  - Start carvedilol 3.125 mg p.o. twice daily  - Wean off nicardipine drip.    - Goal systolic blood pressure less than 120mmHg.  - Daily aspirin  - Continue heparin  - Continue statin      Benign essential hypertension    Assessment/Plan: Coreg started today, Norvasc added. Titrate as needed      Abdominal aortic aneurysm (H)    Assessment/Plan: follow as outpatient       Diet: Regular Diet Adult    DVT Prophylaxis: Heparin   Maddox Catheter: Not present  Central Lines: None  Code Status: Full Code      Disposition Plan   Expected discharge: 09/14/2021   recommended to prior living arrangement once Cardiology work up completed.     The patient's care was discussed with the Patient and critical care team.    Jake Montero MD  Hospitalist Service  [facility]  Securely message with the Voc[employer] Web Console (learn more here)  Text page via [employer] Paging/Directory      Clinically Significant Risk Factors Present on Admission               ______________________________________________________________________    Interval History     Received sign out from critical care team.   No CP/SOB  No fevers or chills  No nausea/vomiting    Data reviewed today: I reviewed all medications, new labs and imaging results over the last 24  hours. I personally reviewed no images or EKG's today.    Physical Exam   Vital Signs: Temp: 97.7  F (36.5  C) Temp src: Oral BP: 132/76 Pulse: 68   Resp: 21 SpO2: 93 % O2 Device: None (Room air)    Weight: 213 lbs 6.48 oz    Constitutional: awake, alert, cooperative, no apparent distress.   Eyes: Lids and lashes normal, pupils equal, round and reactive to light   ENT: Normocephalic, without obvious abnormality, atraumatic, sinuses nontender on palpation   Hematologic / Lymphatic: no cervical lymphadenopathy   Respiratory: CTABL   Cardiovascular: RRR with no m/r/g   GI: Normal bowel sounds, soft, non-distended, non-tender.   Skin: normal skin color, texture, turgor   Musculoskeletal: There is no redness, warmth, or swelling of the joints. Full range of motion noted.   Neurologic: Awake, alert, oriented to name, place and time. Cranial nerves II-XII are grossly intact. Motor is 5 out of 5 bilaterally. Sensory is intact.   Neuropsychiatric: normal mood and affect      Data   Recent Labs   Lab 09/08/21  1245 09/08/21  0904 09/08/21  0501 09/07/21  1617 09/07/21  1306 09/07/21  0710   WBC  --  7.6  --   --  9.9 8.3   HGB  --  14.9  --   --  15.0 15.3   MCV  --  94  --   --  93 95   PLT  --  232  --   --  239 245   INR  --   --   --   --   --  1.05   NA  --  140  --   --  142 139   POTASSIUM  --  4.4  --   --  4.3 4.3   CHLORIDE  --  114*  --   --  115* 111*   CO2  --  23  --   --  23 26   BUN  --  19  --   --  21 23   CR  --  1.19  --   --  1.25 1.36*   ANIONGAP  --  3  --   --  4 2*   ARELI  --  8.7  --   --  8.8 9.1   GLC 72 142* 95  --  98 92   ALBUMIN  --   --   --   --  3.4  --    PROTTOTAL  --   --   --   --  7.3  --    BILITOTAL  --   --   --   --  0.5  --    ALKPHOS  --   --   --   --  71  --    ALT  --   --   --   --  57  --    AST  --   --   --   --  44  --    TROPONIN  --   --   --  0.362* 0.199*  --      No results found for this or any previous visit (from the past 24 hour(s)).  Medications     niCARdipine  Stopped (09/08/21 0958)       amLODIPine  5 mg Oral Daily     aspirin  81 mg Oral Daily     atorvastatin  40 mg Oral QPM     carvedilol  3.125 mg Oral BID w/meals     heparin ANTICOAGULANT  5,000 Units Subcutaneous Q8H     pantoprazole (PROTONIX) IV  40 mg Intravenous Daily with breakfast

## 2021-09-08 NOTE — PROVIDER NOTIFICATION
Per cardiology and CV surgery, pt okay to transfer to floor. Per CV surgery, the earliest a CAB could be scheduled is Friday. ICU MD placed hospitalist consult.  1420: Dr. Montero paged for transfer orders and PRN BP meds.   -tx orders received and PO amlodipine added.

## 2021-09-08 NOTE — PLAN OF CARE
Shift Summary: [id number]    Neuro: intact. Pt impulsive at times  CV: SB/SR with 1 degree AV block. Nicardipine titrated off, oral meds added to keep SBP<140.  Resp: RA  GI: Regular diet. Given senna this evening d/t no BM.  : voiding adequately  Inf/Endo: afebrile. No lytes replaced  Lines/Gtts: PIVx2, Arterial line removed.  Family: pts daughters at bedside and updated.     Tx orders in, awaiting bed. Plan for CAB on Friday, at the earliest.    Evelin Navarro RN on 9/8/2021 at 6:55 PM

## 2021-09-08 NOTE — PROGRESS NOTES
CVTS    Patient seen with Dr. Miller  Pre op work up completed  Plan for CABG on Friday pending OR availability    Corinna Slade PA-C

## 2021-09-09 ENCOUNTER — ANESTHESIA EVENT (OUTPATIENT)
Dept: SURGERY | Facility: CLINIC | Age: 86
DRG: 235 | End: 2021-09-09
Payer: MEDICARE

## 2021-09-09 ENCOUNTER — APPOINTMENT (OUTPATIENT)
Dept: GENERAL RADIOLOGY | Facility: CLINIC | Age: 86
DRG: 235 | End: 2021-09-09
Attending: SURGERY
Payer: MEDICARE

## 2021-09-09 LAB
ANION GAP SERPL CALCULATED.3IONS-SCNC: 1 MMOL/L (ref 3–14)
BLD PROD TYP BPU: NORMAL
BLOOD COMPONENT TYPE: NORMAL
BUN SERPL-MCNC: 20 MG/DL (ref 7–30)
CALCIUM SERPL-MCNC: 8.7 MG/DL (ref 8.5–10.1)
CHLORIDE BLD-SCNC: 112 MMOL/L (ref 94–109)
CHOLEST SERPL-MCNC: 162 MG/DL
CO2 SERPL-SCNC: 26 MMOL/L (ref 20–32)
CODING SYSTEM: NORMAL
CREAT SERPL-MCNC: 1.39 MG/DL (ref 0.66–1.25)
CROSSMATCH: NORMAL
ERYTHROCYTE [DISTWIDTH] IN BLOOD BY AUTOMATED COUNT: 12.6 % (ref 10–15)
GFR SERPL CREATININE-BSD FRML MDRD: 46 ML/MIN/1.73M2
GLUCOSE BLD-MCNC: 103 MG/DL (ref 70–99)
GLUCOSE BLDC GLUCOMTR-MCNC: 110 MG/DL (ref 70–99)
GLUCOSE BLDC GLUCOMTR-MCNC: 95 MG/DL (ref 70–99)
HCT VFR BLD AUTO: 43.9 % (ref 40–53)
HDLC SERPL-MCNC: 40 MG/DL
HGB BLD-MCNC: 14.1 G/DL (ref 13.3–17.7)
ISSUE DATE AND TIME: NORMAL
ISSUE DATE AND TIME: NORMAL
LDLC SERPL CALC-MCNC: 100 MG/DL
MCH RBC QN AUTO: 30.7 PG (ref 26.5–33)
MCHC RBC AUTO-ENTMCNC: 32.1 G/DL (ref 31.5–36.5)
MCV RBC AUTO: 96 FL (ref 78–100)
NONHDLC SERPL-MCNC: 122 MG/DL
PLATELET # BLD AUTO: 220 10E3/UL (ref 150–450)
POTASSIUM BLD-SCNC: 4.3 MMOL/L (ref 3.4–5.3)
RBC # BLD AUTO: 4.59 10E6/UL (ref 4.4–5.9)
SODIUM SERPL-SCNC: 139 MMOL/L (ref 133–144)
TRIGL SERPL-MCNC: 111 MG/DL
UNIT ABO/RH: NORMAL
UNIT NUMBER: NORMAL
UNIT STATUS: NORMAL
UNIT TYPE ISBT: 5100
WBC # BLD AUTO: 9.6 10E3/UL (ref 4–11)

## 2021-09-09 PROCEDURE — 71046 X-RAY EXAM CHEST 2 VIEWS: CPT

## 2021-09-09 PROCEDURE — 86923 COMPATIBILITY TEST ELECTRIC: CPT | Performed by: INTERNAL MEDICINE

## 2021-09-09 PROCEDURE — 87641 MR-STAPH DNA AMP PROBE: CPT | Performed by: SURGERY

## 2021-09-09 PROCEDURE — C9113 INJ PANTOPRAZOLE SODIUM, VIA: HCPCS | Performed by: STUDENT IN AN ORGANIZED HEALTH CARE EDUCATION/TRAINING PROGRAM

## 2021-09-09 PROCEDURE — 85027 COMPLETE CBC AUTOMATED: CPT | Performed by: STUDENT IN AN ORGANIZED HEALTH CARE EDUCATION/TRAINING PROGRAM

## 2021-09-09 PROCEDURE — 250N000013 HC RX MED GY IP 250 OP 250 PS 637: Performed by: STUDENT IN AN ORGANIZED HEALTH CARE EDUCATION/TRAINING PROGRAM

## 2021-09-09 PROCEDURE — 250N000013 HC RX MED GY IP 250 OP 250 PS 637: Performed by: NURSE PRACTITIONER

## 2021-09-09 PROCEDURE — 80048 BASIC METABOLIC PNL TOTAL CA: CPT | Performed by: STUDENT IN AN ORGANIZED HEALTH CARE EDUCATION/TRAINING PROGRAM

## 2021-09-09 PROCEDURE — 250N000013 HC RX MED GY IP 250 OP 250 PS 637: Performed by: INTERNAL MEDICINE

## 2021-09-09 PROCEDURE — 210N000002 HC R&B HEART CARE

## 2021-09-09 PROCEDURE — 250N000011 HC RX IP 250 OP 636: Performed by: STUDENT IN AN ORGANIZED HEALTH CARE EDUCATION/TRAINING PROGRAM

## 2021-09-09 PROCEDURE — 99232 SBSQ HOSP IP/OBS MODERATE 35: CPT | Performed by: INTERNAL MEDICINE

## 2021-09-09 PROCEDURE — 36415 COLL VENOUS BLD VENIPUNCTURE: CPT | Performed by: STUDENT IN AN ORGANIZED HEALTH CARE EDUCATION/TRAINING PROGRAM

## 2021-09-09 PROCEDURE — 80061 LIPID PANEL: CPT | Performed by: INTERNAL MEDICINE

## 2021-09-09 RX ORDER — CARVEDILOL 6.25 MG/1
12.5 TABLET ORAL 2 TIMES DAILY WITH MEALS
Status: DISCONTINUED | OUTPATIENT
Start: 2021-09-09 | End: 2021-09-10

## 2021-09-09 RX ORDER — CARVEDILOL 6.25 MG/1
6.25 TABLET ORAL ONCE
Status: COMPLETED | OUTPATIENT
Start: 2021-09-09 | End: 2021-09-09

## 2021-09-09 RX ADMIN — HEPARIN SODIUM 5000 UNITS: 5000 INJECTION INTRAVENOUS; SUBCUTANEOUS at 06:54

## 2021-09-09 RX ADMIN — ATORVASTATIN CALCIUM 40 MG: 40 TABLET, FILM COATED ORAL at 21:41

## 2021-09-09 RX ADMIN — CARVEDILOL 6.25 MG: 6.25 TABLET, FILM COATED ORAL at 09:42

## 2021-09-09 RX ADMIN — AMLODIPINE BESYLATE 5 MG: 5 TABLET ORAL at 09:42

## 2021-09-09 RX ADMIN — PANTOPRAZOLE SODIUM 40 MG: 40 INJECTION, POWDER, FOR SOLUTION INTRAVENOUS at 09:42

## 2021-09-09 RX ADMIN — ASPIRIN 81 MG CHEWABLE TABLET 81 MG: 81 TABLET CHEWABLE at 09:42

## 2021-09-09 RX ADMIN — HEPARIN SODIUM 5000 UNITS: 5000 INJECTION INTRAVENOUS; SUBCUTANEOUS at 21:43

## 2021-09-09 RX ADMIN — HEPARIN SODIUM 5000 UNITS: 5000 INJECTION INTRAVENOUS; SUBCUTANEOUS at 15:53

## 2021-09-09 RX ADMIN — CARVEDILOL 6.25 MG: 6.25 TABLET, FILM COATED ORAL at 12:52

## 2021-09-09 RX ADMIN — CARVEDILOL 12.5 MG: 12.5 TABLET, FILM COATED ORAL at 21:40

## 2021-09-09 ASSESSMENT — ACTIVITIES OF DAILY LIVING (ADL)
ADLS_ACUITY_SCORE: 16

## 2021-09-09 ASSESSMENT — MIFFLIN-ST. JEOR: SCORE: 1680.13

## 2021-09-09 NOTE — PROGRESS NOTES
[facility]    Medicine Progress Note - Hospitalist Service       Date of Admission:  9/7/2021    Assessment & Plan             CAD (coronary artery disease)    Abnormal cardiovascular stress test    Unstable angina pectoris due to coronary arteriosclerosis (H)    Assessment: patient was undergoing pre-op eval for AAA needing EVAR; had an abnormal stress test in 6/2021; a diagnostic angio 09/07 that showed 3 vessel CAD. Subsequently he developed severe hypotension SBP 40s and required dopamine drip and phenylephrine boluses, but did stabilize on dopamine drip 5mcg and was transfered to ICU. In the elevator he had vfib and was shocked once back into sinus rhythm. Now HD stable  [PTA on ASA prn and Toprol XL 25 mg po daily]  - seen by Cardiology  - started on ASA 81 mg po daily, Norvasc 5 mg po daily, Coreg 6.25 mg po BID, Lipitor 40 mg po daily  - lipid profile  - cr 1.19--1.39 today   - BMP in am  - CV[facility] consulted  - plan for CABG tomorrow?  - denies chest pain, no SOB  - no need for heparin drip for now since he is chest pain free    Ischemic cardiomyopathy  - EF 38% on stress test from 6/2021  - looks euvolemic, no leg swelling, lungs are clear to ausculattion  - started on Coreg as above      Benign essential hypertension  - PTA Toprol switched to Coreg   - started on Norvasc 5 mg magy daily  - monitor BP      Abdominal aortic aneurysm (H)  - followed as outpatient by Dr Angeles  - CTA abd/pelvis- 5.2021- Infrarenal abdominal aortic aneurysm measuring up to 5.5 cm AP by 4.9 cm transverse, increased from previous 5.4 cm AP x 4.6 cm transverse. Large amount of intramural thrombus  - EVAR will be deferred until after CV surgery.       Diet: Regular Diet Adult    DVT Prophylaxis: Heparin   Maddox Catheter: Not present  Central Lines: None  Code Status: Full Code      Disposition Plan   Expected discharge: 09/14/2021   recommended to prior living arrangement once Cardiology work up completed.      The patient's care was discussed with the Patient and critical care team.    Jackie Bañuelos MD  Hospitalist Service  [facility]  Securely message with the [employer] Web Console (learn more here)  Text page via [employer] Paging/Directory      Clinically Significant Risk Factors Present on Admission               ______________________________________________________________________    Interval History   Doing fine, up in the chair, dressed with street clothes  - denies chest pain, no SOB  - no coughing  - no N/V, no abd pain    Data reviewed today: I reviewed all medications, new labs and imaging results over the last 24 hours. I personally reviewed the cardiac cath image(s) showing as above.    Physical Exam   Vital Signs: Temp: 98.3  F (36.8  C) Temp src: Oral BP: (!) 140/85 Pulse: 65   Resp: 20 SpO2: 96 % O2 Device: None (Room air)    Weight: 213 lbs 6.48 oz    Constitutional: awake, alert, cooperative, no apparent distress.   Eyes: Lids and lashes normal, pupils equal, round and reactive to light   ENT: Normocephalic, without obvious abnormality, atraumatic, sinuses nontender on palpation   Hematologic / Lymphatic: no cervical lymphadenopathy   Respiratory: CTABL   Cardiovascular: RRR with no m/r/g   GI: Normal bowel sounds, soft, non-distended, non-tender.   Skin: normal skin color, texture, turgor   Musculoskeletal: There is no redness, warmth, or swelling of the joints. Full range of motion noted.   Neurologic: Awake, alert, oriented to name, place and time. Cranial nerves II-XII are grossly intact. Motor is 5 out of 5 bilaterally. Sensory is intact.   Neuropsychiatric: normal mood and affect      Data   Recent Labs   Lab 09/09/21  0750 09/09/21  0506 09/09/21  0411 09/08/21  0904 09/07/21  1617 09/07/21  1306 09/07/21  0710   WBC  --  9.6  --  7.6  --  9.9 8.3   HGB  --  14.1  --  14.9  --  15.0 15.3   MCV  --  96  --  94  --  93 95   PLT  --  220  --  232  --  239 245   INR  --    --   --   --   --   --  1.05   NA  --  139  --  140  --  142 139   POTASSIUM  --  4.3  --  4.4  --  4.3 4.3   CHLORIDE  --  112*  --  114*  --  115* 111*   CO2  --  26  --  23  --  23 26   BUN  --  20  --  19  --  21 23   CR  --  1.39*  --  1.19  --  1.25 1.36*   ANIONGAP  --  1*  --  3  --  4 2*   ARELI  --  8.7  --  8.7  --  8.8 9.1   GLC 95 103* 110* 142*  --  98 92   ALBUMIN  --   --   --   --   --  3.4  --    PROTTOTAL  --   --   --   --   --  7.3  --    BILITOTAL  --   --   --   --   --  0.5  --    ALKPHOS  --   --   --   --   --  71  --    ALT  --   --   --   --   --  57  --    AST  --   --   --   --   --  44  --    TROPONIN  --   --   --   --  0.362* 0.199*  --      No results found for this or any previous visit (from the past 24 hour(s)).  Medications     niCARdipine Stopped (09/08/21 0958)       amLODIPine  5 mg Oral Daily     aspirin  81 mg Oral Daily     atorvastatin  40 mg Oral QPM     carvedilol  6.25 mg Oral BID w/meals     heparin ANTICOAGULANT  5,000 Units Subcutaneous Q8H     pantoprazole (PROTONIX) IV  40 mg Intravenous Daily with breakfast

## 2021-09-09 NOTE — PROGRESS NOTES
Patient is angry that he does not have a bed to move to. Intensivist agreed that patient does not need chair alarm or cardiac monitoring, blood pressure cuff or O2 sat monitor at this time. Patient agreed to use call button if he gets up at all.

## 2021-09-09 NOTE — PROGRESS NOTES
Patient does have transfer orders and has no ICU needs at this time. Was told no beds available to transfer to.

## 2021-09-09 NOTE — PROGRESS NOTES
All cardiac monitoring leads, blood pressure cuff and O2 sat monitor are on. Chair alarm is on. Call button is within reach.

## 2021-09-09 NOTE — PROGRESS NOTES
CVTS    Patient seen  Plan for CABG tomorrow at noon with Dr. Pelayo.  Pre op Teaching provided to patient and family on speaker phone.     Corinna Slade PA-C  CV Surgery

## 2021-09-09 NOTE — PROGRESS NOTES
Patient said he would like to go now. This writer informed patient that while he does have transfer orders there is no bed to go to yet. [place] [facility] [place] is [place] bed but not enough staff to take this patient.

## 2021-09-09 NOTE — PROGRESS NOTES
Patient is alert and oriented x 4. He has decided to remove his cardiac monitoring leads, blood pressure cuff and o2 sat monitor. He refuses to allow this writer to reattach them.

## 2021-09-09 NOTE — PROGRESS NOTES
[facility]  Cardiology Progress Note  Date of Service: 09/09/2021      Assessment & Plan    Dragan Prasad is a 85 year old male admitted on 9/7/2021 after presenting for coronary angiogram as part of pre-op evaluation for EVAR, during angiogram he became unstable, systolic blood pressure in 40s, required dopamine gtt and phenylephrine boluses. He was transferred to the ICU where he had a vfib arrest and was shocked once back into sinus rhythm.     Transferred to hospital medicine service 9/8/21    Assessment:  1. Severe three-vessel coronary artery disease - including LAD, circumflex and right coronary artery. CV surgery following, CABG pending, tentatively scheduled for Friday pending OR availability.   2. Abdominal aortic aneurysm, 5.5 cm - plan for EVAR, this will be deferred until after CABG.  3. CKD stage III   4. Hypertension  5. Dyslipidemia  6. Aortic stenosis    Patient continues to feel well. Family at bedside during our visit today. Blood pressure remains elevated, recommend further titration of his carvedilol today. Blood pressure goal of less than 120mmHg systolically. BMP reviewed shows slight worsening of renal function. Will continue to monitor, BMP tomorrow. CABG pending.     Plan:   1. INCREASE carvedilol to 12.5mg BID  2. Continue aspirin  3. Continue amlodipine 5mg daily   4. Continue atorvastatin 40mg daily  5. Okay to remain off heparin gtt if chest pain free    6. BMP tomorrow  7. Time of CABG per CV surgery    LAURA Arce [facility]  Pager: [phone]      Interval History   Seen at bedside. No acute events overnight. Denies chest pain. Denies shortness of breath. Denies dizziness/lightheadedness.     Physical Exam   Temp: 98.3  F (36.8  C) Temp src: Oral BP: (!) 140/85 Pulse: 65   Resp: 20 SpO2: 96 % O2 Device: None (Room air)    Vitals:    09/07/21 0641 09/08/21 0000 09/09/21 0400   Weight: 100.4 kg (221 lb 6.4 oz) 96.8 kg (213 lb 6.5 oz) 96.8  kg (213 lb 6.5 oz)   GEN: well nourished, in no acute distress.  HEENT:  Pupils equal, round. Sclerae nonicteric.   NECK: Supple, no masses appreciated.   C/V:  Regular rate and rhythm, no murmur, rub or gallop.    RESP: Respirations are unlabored. Clear to auscultation bilaterally without wheezing, rales, or rhonchi.  GI: Abdomen soft, nontender.  EXTREM: No LE edema.  NEURO: Alert and oriented, cooperative.  SKIN: Warm and dry.     Medications     niCARdipine Stopped (09/08/21 0958)       amLODIPine  5 mg Oral Daily     aspirin  81 mg Oral Daily     atorvastatin  40 mg Oral QPM     carvedilol  6.25 mg Oral BID w/meals     heparin ANTICOAGULANT  5,000 Units Subcutaneous Q8H     pantoprazole (PROTONIX) IV  40 mg Intravenous Daily with breakfast       Data   Last 24 hours labs reviewed     Echo: 9/2/21  There is borderline concentric left ventricular hypertrophy.  Mildly decreased left ventricular systolic function  The visual ejection fraction is estimated at 47-51%.  There is mild lateral wall hypokinesis.  There is mild to moderate inferolateral wall hypokinesis.  Aortic valve not well seen but it is abnormal with thickening (cannot see  individual cusps well). Mild aortic valve stenosis    Last ischemic eval: cardiac catheterization 9/7/21  Left Main   Ost LM lesion is 50% stenosed.   Left Anterior Descending   Prox LAD lesion is 99% stenosed. The lesion is type C - high risk.   Mid LAD lesion is 90% stenosed.   Left Circumflex   Ost Cx lesion is 80% stenosed.   Prox Cx to Mid Cx lesion is 60% stenosed.   Right Coronary Artery   Prox RCA lesion is 80% stenosed.   Mid RCA lesion is 70% stenosed.   Dist RCA lesion is 50% stenosed.

## 2021-09-10 ENCOUNTER — APPOINTMENT (OUTPATIENT)
Dept: GENERAL RADIOLOGY | Facility: CLINIC | Age: 86
DRG: 235 | End: 2021-09-10
Payer: MEDICARE

## 2021-09-10 ENCOUNTER — ANESTHESIA (OUTPATIENT)
Dept: SURGERY | Facility: CLINIC | Age: 86
DRG: 235 | End: 2021-09-10
Payer: MEDICARE

## 2021-09-10 LAB
ALBUMIN SERPL-MCNC: 2.9 G/DL (ref 3.4–5)
ALP SERPL-CCNC: 55 U/L (ref 40–150)
ALT SERPL W P-5'-P-CCNC: 46 U/L (ref 0–70)
ANION GAP SERPL CALCULATED.3IONS-SCNC: 4 MMOL/L (ref 3–14)
ANION GAP SERPL CALCULATED.3IONS-SCNC: 5 MMOL/L (ref 3–14)
ANION GAP SERPL CALCULATED.3IONS-SCNC: 6 MMOL/L (ref 3–14)
APTT PPP: 30 SECONDS (ref 22–38)
APTT PPP: 49 SECONDS (ref 22–38)
AST SERPL W P-5'-P-CCNC: 61 U/L (ref 0–45)
BASE EXCESS BLDA CALC-SCNC: -5.5 MMOL/L (ref -9–1.8)
BASE EXCESS BLDV CALC-SCNC: -4.5 MMOL/L (ref -7.7–1.9)
BILIRUB SERPL-MCNC: 0.8 MG/DL (ref 0.2–1.3)
BUN SERPL-MCNC: 18 MG/DL (ref 7–30)
BUN SERPL-MCNC: 19 MG/DL (ref 7–30)
BUN SERPL-MCNC: 21 MG/DL (ref 7–30)
CA-I BLD-MCNC: 4.4 MG/DL (ref 4.4–5.2)
CALCIUM SERPL-MCNC: 7.7 MG/DL (ref 8.5–10.1)
CALCIUM SERPL-MCNC: 8 MG/DL (ref 8.5–10.1)
CALCIUM SERPL-MCNC: 8.2 MG/DL (ref 8.5–10.1)
CHLORIDE BLD-SCNC: 113 MMOL/L (ref 94–109)
CHLORIDE BLD-SCNC: 116 MMOL/L (ref 94–109)
CHLORIDE BLD-SCNC: 116 MMOL/L (ref 94–109)
CO2 SERPL-SCNC: 22 MMOL/L (ref 20–32)
CO2 SERPL-SCNC: 24 MMOL/L (ref 20–32)
CO2 SERPL-SCNC: 25 MMOL/L (ref 20–32)
CREAT SERPL-MCNC: 1.18 MG/DL (ref 0.66–1.25)
CREAT SERPL-MCNC: 1.29 MG/DL (ref 0.66–1.25)
CREAT SERPL-MCNC: 1.38 MG/DL (ref 0.66–1.25)
ERYTHROCYTE [DISTWIDTH] IN BLOOD BY AUTOMATED COUNT: 12.7 % (ref 10–15)
ERYTHROCYTE [DISTWIDTH] IN BLOOD BY AUTOMATED COUNT: 12.8 % (ref 10–15)
FIBRINOGEN PPP-MCNC: 263 MG/DL (ref 170–490)
GFR SERPL CREATININE-BSD FRML MDRD: 47 ML/MIN/1.73M2
GFR SERPL CREATININE-BSD FRML MDRD: 51 ML/MIN/1.73M2
GFR SERPL CREATININE-BSD FRML MDRD: 56 ML/MIN/1.73M2
GLUCOSE BLD-MCNC: 132 MG/DL (ref 70–99)
GLUCOSE BLD-MCNC: 146 MG/DL (ref 70–99)
GLUCOSE BLD-MCNC: 95 MG/DL (ref 70–99)
GLUCOSE BLDC GLUCOMTR-MCNC: 118 MG/DL (ref 70–99)
GLUCOSE BLDC GLUCOMTR-MCNC: 130 MG/DL (ref 70–99)
GLUCOSE BLDC GLUCOMTR-MCNC: 134 MG/DL (ref 70–99)
GLUCOSE BLDC GLUCOMTR-MCNC: 152 MG/DL (ref 70–99)
HCO3 BLD-SCNC: 21 MMOL/L (ref 21–28)
HCO3 BLDV-SCNC: 23 MMOL/L (ref 21–28)
HCT VFR BLD AUTO: 33.9 % (ref 40–53)
HCT VFR BLD AUTO: 38.1 % (ref 40–53)
HGB BLD-MCNC: 10.9 G/DL (ref 13.3–17.7)
HGB BLD-MCNC: 12.2 G/DL (ref 13.3–17.7)
INR PPP: 1.37 (ref 0.85–1.15)
INR PPP: 1.69 (ref 0.85–1.15)
LACTATE SERPL-SCNC: 1.6 MMOL/L (ref 0.7–2)
MAGNESIUM SERPL-MCNC: 2.4 MG/DL (ref 1.6–2.3)
MCH RBC QN AUTO: 30.8 PG (ref 26.5–33)
MCH RBC QN AUTO: 31 PG (ref 26.5–33)
MCHC RBC AUTO-ENTMCNC: 32 G/DL (ref 31.5–36.5)
MCHC RBC AUTO-ENTMCNC: 32.2 G/DL (ref 31.5–36.5)
MCV RBC AUTO: 96 FL (ref 78–100)
MCV RBC AUTO: 97 FL (ref 78–100)
MRSA DNA SPEC QL NAA+PROBE: NEGATIVE
O2/TOTAL GAS SETTING VFR VENT: 60 %
O2/TOTAL GAS SETTING VFR VENT: 60 %
OXYHGB MFR BLD: 96 % (ref 92–100)
OXYHGB MFR BLDV: 77 % (ref 70–75)
PCO2 BLD: 47 MM HG (ref 35–45)
PCO2 BLDV: 53 MM HG (ref 40–50)
PH BLD: 7.27 [PH] (ref 7.35–7.45)
PH BLDV: 7.25 [PH] (ref 7.32–7.43)
PHOSPHATE SERPL-MCNC: 3.5 MG/DL (ref 2.5–4.5)
PLATELET # BLD AUTO: 125 10E3/UL (ref 150–450)
PLATELET # BLD AUTO: 160 10E3/UL (ref 150–450)
PLATELET # BLD AUTO: 199 10E3/UL (ref 150–450)
PO2 BLD: 104 MM HG (ref 80–105)
PO2 BLDV: 48 MM HG (ref 25–47)
POTASSIUM BLD-SCNC: 4.1 MMOL/L (ref 3.4–5.3)
POTASSIUM BLD-SCNC: 4.9 MMOL/L (ref 3.4–5.3)
POTASSIUM BLD-SCNC: 4.9 MMOL/L (ref 3.4–5.3)
POTASSIUM BLD-SCNC: 5.1 MMOL/L (ref 3.4–5.3)
PROT SERPL-MCNC: 5.9 G/DL (ref 6.8–8.8)
RBC # BLD AUTO: 3.54 10E6/UL (ref 4.4–5.9)
RBC # BLD AUTO: 3.93 10E6/UL (ref 4.4–5.9)
SA TARGET DNA: NEGATIVE
SODIUM SERPL-SCNC: 143 MMOL/L (ref 133–144)
SODIUM SERPL-SCNC: 144 MMOL/L (ref 133–144)
SODIUM SERPL-SCNC: 144 MMOL/L (ref 133–144)
WBC # BLD AUTO: 17.5 10E3/UL (ref 4–11)
WBC # BLD AUTO: 18.7 10E3/UL (ref 4–11)

## 2021-09-10 PROCEDURE — 258N000003 HC RX IP 258 OP 636: Performed by: NURSE ANESTHETIST, CERTIFIED REGISTERED

## 2021-09-10 PROCEDURE — 82947 ASSAY GLUCOSE BLOOD QUANT: CPT | Performed by: STUDENT IN AN ORGANIZED HEALTH CARE EDUCATION/TRAINING PROGRAM

## 2021-09-10 PROCEDURE — 258N000003 HC RX IP 258 OP 636: Performed by: SURGERY

## 2021-09-10 PROCEDURE — 250N000013 HC RX MED GY IP 250 OP 250 PS 637: Performed by: INTERNAL MEDICINE

## 2021-09-10 PROCEDURE — 370N000017 HC ANESTHESIA TECHNICAL FEE, PER MIN: Performed by: SURGERY

## 2021-09-10 PROCEDURE — 82803 BLOOD GASES ANY COMBINATION: CPT | Performed by: SURGERY

## 2021-09-10 PROCEDURE — 33508 ENDOSCOPIC VEIN HARVEST: CPT | Mod: XU | Performed by: SURGERY

## 2021-09-10 PROCEDURE — 250N000011 HC RX IP 250 OP 636: Performed by: SURGERY

## 2021-09-10 PROCEDURE — 250N000012 HC RX MED GY IP 250 OP 636 PS 637: Performed by: STUDENT IN AN ORGANIZED HEALTH CARE EDUCATION/TRAINING PROGRAM

## 2021-09-10 PROCEDURE — 83605 ASSAY OF LACTIC ACID: CPT | Performed by: STUDENT IN AN ORGANIZED HEALTH CARE EDUCATION/TRAINING PROGRAM

## 2021-09-10 PROCEDURE — 82805 BLOOD GASES W/O2 SATURATION: CPT | Performed by: STUDENT IN AN ORGANIZED HEALTH CARE EDUCATION/TRAINING PROGRAM

## 2021-09-10 PROCEDURE — 85610 PROTHROMBIN TIME: CPT | Performed by: SURGERY

## 2021-09-10 PROCEDURE — 258N000003 HC RX IP 258 OP 636: Performed by: STUDENT IN AN ORGANIZED HEALTH CARE EDUCATION/TRAINING PROGRAM

## 2021-09-10 PROCEDURE — 82330 ASSAY OF CALCIUM: CPT | Performed by: SURGERY

## 2021-09-10 PROCEDURE — 250N000009 HC RX 250: Performed by: INTERNAL MEDICINE

## 2021-09-10 PROCEDURE — 250N000012 HC RX MED GY IP 250 OP 636 PS 637: Performed by: SURGERY

## 2021-09-10 PROCEDURE — 94002 VENT MGMT INPAT INIT DAY: CPT

## 2021-09-10 PROCEDURE — 250N000013 HC RX MED GY IP 250 OP 250 PS 637: Performed by: NURSE PRACTITIONER

## 2021-09-10 PROCEDURE — 250N000011 HC RX IP 250 OP 636

## 2021-09-10 PROCEDURE — 250N000011 HC RX IP 250 OP 636: Performed by: INTERNAL MEDICINE

## 2021-09-10 PROCEDURE — 410N000006: Performed by: SURGERY

## 2021-09-10 PROCEDURE — 02100Z9 BYPASS CORONARY ARTERY, ONE ARTERY FROM LEFT INTERNAL MAMMARY, OPEN APPROACH: ICD-10-PCS | Performed by: SURGERY

## 2021-09-10 PROCEDURE — 85049 AUTOMATED PLATELET COUNT: CPT | Performed by: STUDENT IN AN ORGANIZED HEALTH CARE EDUCATION/TRAINING PROGRAM

## 2021-09-10 PROCEDURE — 250N000013 HC RX MED GY IP 250 OP 250 PS 637: Performed by: SURGERY

## 2021-09-10 PROCEDURE — P9016 RBC LEUKOCYTES REDUCED: HCPCS | Performed by: INTERNAL MEDICINE

## 2021-09-10 PROCEDURE — 999N000063 XR CHEST PORT 1 VIEW

## 2021-09-10 PROCEDURE — 82040 ASSAY OF SERUM ALBUMIN: CPT | Performed by: STUDENT IN AN ORGANIZED HEALTH CARE EDUCATION/TRAINING PROGRAM

## 2021-09-10 PROCEDURE — 250N000011 HC RX IP 250 OP 636: Performed by: STUDENT IN AN ORGANIZED HEALTH CARE EDUCATION/TRAINING PROGRAM

## 2021-09-10 PROCEDURE — 999N000141 HC STATISTIC PRE-PROCEDURE NURSING ASSESSMENT: Performed by: SURGERY

## 2021-09-10 PROCEDURE — 99232 SBSQ HOSP IP/OBS MODERATE 35: CPT | Performed by: INTERNAL MEDICINE

## 2021-09-10 PROCEDURE — 258N000003 HC RX IP 258 OP 636

## 2021-09-10 PROCEDURE — 250N000009 HC RX 250: Performed by: SURGERY

## 2021-09-10 PROCEDURE — C9113 INJ PANTOPRAZOLE SODIUM, VIA: HCPCS | Performed by: STUDENT IN AN ORGANIZED HEALTH CARE EDUCATION/TRAINING PROGRAM

## 2021-09-10 PROCEDURE — P9041 ALBUMIN (HUMAN),5%, 50ML: HCPCS | Performed by: NURSE ANESTHETIST, CERTIFIED REGISTERED

## 2021-09-10 PROCEDURE — 410N000005 HC PER-PERFUSION, SH ONLY, 1ST 30 MIN: Performed by: SURGERY

## 2021-09-10 PROCEDURE — 33533 CABG ARTERIAL SINGLE: CPT | Mod: GC | Performed by: SURGERY

## 2021-09-10 PROCEDURE — 272N000001 HC OR GENERAL SUPPLY STERILE: Performed by: SURGERY

## 2021-09-10 PROCEDURE — 021109W BYPASS CORONARY ARTERY, TWO ARTERIES FROM AORTA WITH AUTOLOGOUS VENOUS TISSUE, OPEN APPROACH: ICD-10-PCS | Performed by: SURGERY

## 2021-09-10 PROCEDURE — 200N000001 HC R&B ICU

## 2021-09-10 PROCEDURE — 250N000024 HC ISOFLURANE, PER MIN: Performed by: SURGERY

## 2021-09-10 PROCEDURE — 250N000009 HC RX 250: Performed by: NURSE ANESTHETIST, CERTIFIED REGISTERED

## 2021-09-10 PROCEDURE — 93005 ELECTROCARDIOGRAM TRACING: CPT

## 2021-09-10 PROCEDURE — 80048 BASIC METABOLIC PNL TOTAL CA: CPT | Performed by: SURGERY

## 2021-09-10 PROCEDURE — 06BQ4ZZ EXCISION OF LEFT SAPHENOUS VEIN, PERCUTANEOUS ENDOSCOPIC APPROACH: ICD-10-PCS | Performed by: SURGERY

## 2021-09-10 PROCEDURE — 84100 ASSAY OF PHOSPHORUS: CPT | Performed by: STUDENT IN AN ORGANIZED HEALTH CARE EDUCATION/TRAINING PROGRAM

## 2021-09-10 PROCEDURE — 999N000157 HC STATISTIC RCP TIME EA 10 MIN

## 2021-09-10 PROCEDURE — 85384 FIBRINOGEN ACTIVITY: CPT | Performed by: SURGERY

## 2021-09-10 PROCEDURE — 85610 PROTHROMBIN TIME: CPT | Performed by: STUDENT IN AN ORGANIZED HEALTH CARE EDUCATION/TRAINING PROGRAM

## 2021-09-10 PROCEDURE — 250N000009 HC RX 250

## 2021-09-10 PROCEDURE — 250N000011 HC RX IP 250 OP 636: Performed by: NURSE ANESTHETIST, CERTIFIED REGISTERED

## 2021-09-10 PROCEDURE — 258N000003 HC RX IP 258 OP 636: Performed by: ANESTHESIOLOGY

## 2021-09-10 PROCEDURE — 84132 ASSAY OF SERUM POTASSIUM: CPT | Performed by: STUDENT IN AN ORGANIZED HEALTH CARE EDUCATION/TRAINING PROGRAM

## 2021-09-10 PROCEDURE — 36415 COLL VENOUS BLD VENIPUNCTURE: CPT | Performed by: STUDENT IN AN ORGANIZED HEALTH CARE EDUCATION/TRAINING PROGRAM

## 2021-09-10 PROCEDURE — 80069 RENAL FUNCTION PANEL: CPT | Performed by: INTERNAL MEDICINE

## 2021-09-10 PROCEDURE — P9041 ALBUMIN (HUMAN),5%, 50ML: HCPCS

## 2021-09-10 PROCEDURE — 33518 CABG ARTERY-VEIN TWO: CPT | Mod: GC | Performed by: SURGERY

## 2021-09-10 PROCEDURE — 360N000079 HC SURGERY LEVEL 6, PER MIN: Performed by: SURGERY

## 2021-09-10 PROCEDURE — 85049 AUTOMATED PLATELET COUNT: CPT | Performed by: SURGERY

## 2021-09-10 PROCEDURE — 5A1221Z PERFORMANCE OF CARDIAC OUTPUT, CONTINUOUS: ICD-10-PCS | Performed by: SURGERY

## 2021-09-10 PROCEDURE — 999N000009 HC STATISTIC AIRWAY CARE

## 2021-09-10 PROCEDURE — 85730 THROMBOPLASTIN TIME PARTIAL: CPT | Performed by: STUDENT IN AN ORGANIZED HEALTH CARE EDUCATION/TRAINING PROGRAM

## 2021-09-10 PROCEDURE — 82330 ASSAY OF CALCIUM: CPT | Performed by: STUDENT IN AN ORGANIZED HEALTH CARE EDUCATION/TRAINING PROGRAM

## 2021-09-10 PROCEDURE — 99233 SBSQ HOSP IP/OBS HIGH 50: CPT | Mod: 24 | Performed by: INTERNAL MEDICINE

## 2021-09-10 PROCEDURE — 85730 THROMBOPLASTIN TIME PARTIAL: CPT | Performed by: SURGERY

## 2021-09-10 PROCEDURE — 83735 ASSAY OF MAGNESIUM: CPT | Performed by: STUDENT IN AN ORGANIZED HEALTH CARE EDUCATION/TRAINING PROGRAM

## 2021-09-10 RX ORDER — CEFAZOLIN SODIUM 2 G/100ML
2 INJECTION, SOLUTION INTRAVENOUS SEE ADMIN INSTRUCTIONS
Status: DISCONTINUED | OUTPATIENT
Start: 2021-09-10 | End: 2021-09-10 | Stop reason: HOSPADM

## 2021-09-10 RX ORDER — BISACODYL 10 MG
10 SUPPOSITORY, RECTAL RECTAL DAILY PRN
Status: DISCONTINUED | OUTPATIENT
Start: 2021-09-10 | End: 2021-09-17 | Stop reason: HOSPADM

## 2021-09-10 RX ORDER — EPINEPHRINE IN 0.9 % SOD CHLOR 5 MG/250ML
.01-.1 PLASTIC BAG, INJECTION (ML) INTRAVENOUS CONTINUOUS PRN
Status: DISCONTINUED | OUTPATIENT
Start: 2021-09-10 | End: 2021-09-11

## 2021-09-10 RX ORDER — HYDRALAZINE HYDROCHLORIDE 20 MG/ML
10 INJECTION INTRAMUSCULAR; INTRAVENOUS EVERY 30 MIN PRN
Status: DISCONTINUED | OUTPATIENT
Start: 2021-09-10 | End: 2021-09-17 | Stop reason: HOSPADM

## 2021-09-10 RX ORDER — HYDROMORPHONE HCL IN WATER/PF 6 MG/30 ML
0.2 PATIENT CONTROLLED ANALGESIA SYRINGE INTRAVENOUS
Status: DISCONTINUED | OUTPATIENT
Start: 2021-09-10 | End: 2021-09-17 | Stop reason: HOSPADM

## 2021-09-10 RX ORDER — ALBUMIN, HUMAN INJ 5% 5 %
500-1000 SOLUTION INTRAVENOUS
Status: COMPLETED | OUTPATIENT
Start: 2021-09-10 | End: 2021-09-11

## 2021-09-10 RX ORDER — METHOCARBAMOL 500 MG/1
500 TABLET, FILM COATED ORAL EVERY 6 HOURS PRN
Status: DISCONTINUED | OUTPATIENT
Start: 2021-09-10 | End: 2021-09-17 | Stop reason: HOSPADM

## 2021-09-10 RX ORDER — CEFAZOLIN SODIUM 2 G/100ML
2 INJECTION, SOLUTION INTRAVENOUS
Status: DISCONTINUED | OUTPATIENT
Start: 2021-09-10 | End: 2021-09-10 | Stop reason: HOSPADM

## 2021-09-10 RX ORDER — NALOXONE HYDROCHLORIDE 0.4 MG/ML
0.2 INJECTION, SOLUTION INTRAMUSCULAR; INTRAVENOUS; SUBCUTANEOUS
Status: DISCONTINUED | OUTPATIENT
Start: 2021-09-10 | End: 2021-09-17 | Stop reason: HOSPADM

## 2021-09-10 RX ORDER — CHLORHEXIDINE GLUCONATE ORAL RINSE 1.2 MG/ML
15 SOLUTION DENTAL EVERY 12 HOURS
Status: DISCONTINUED | OUTPATIENT
Start: 2021-09-10 | End: 2021-09-11

## 2021-09-10 RX ORDER — ALBUMIN, HUMAN INJ 5% 5 %
SOLUTION INTRAVENOUS CONTINUOUS PRN
Status: DISCONTINUED | OUTPATIENT
Start: 2021-09-10 | End: 2021-09-10

## 2021-09-10 RX ORDER — PROPOFOL 10 MG/ML
5-75 INJECTION, EMULSION INTRAVENOUS CONTINUOUS
Status: DISCONTINUED | OUTPATIENT
Start: 2021-09-10 | End: 2021-09-10

## 2021-09-10 RX ORDER — ACETAMINOPHEN 325 MG/1
975 TABLET ORAL EVERY 8 HOURS
Status: DISPENSED | OUTPATIENT
Start: 2021-09-10 | End: 2021-09-13

## 2021-09-10 RX ORDER — FENTANYL CITRATE 50 UG/ML
INJECTION, SOLUTION INTRAMUSCULAR; INTRAVENOUS PRN
Status: DISCONTINUED | OUTPATIENT
Start: 2021-09-10 | End: 2021-09-10

## 2021-09-10 RX ORDER — NALOXONE HYDROCHLORIDE 0.4 MG/ML
0.4 INJECTION, SOLUTION INTRAMUSCULAR; INTRAVENOUS; SUBCUTANEOUS
Status: DISCONTINUED | OUTPATIENT
Start: 2021-09-10 | End: 2021-09-17 | Stop reason: HOSPADM

## 2021-09-10 RX ORDER — CEFAZOLIN SODIUM 2 G/100ML
2 INJECTION, SOLUTION INTRAVENOUS EVERY 8 HOURS
Status: COMPLETED | OUTPATIENT
Start: 2021-09-11 | End: 2021-09-11

## 2021-09-10 RX ORDER — PHENYLEPHRINE HCL IN 0.9% NACL 50MG/250ML
.1-4 PLASTIC BAG, INJECTION (ML) INTRAVENOUS CONTINUOUS PRN
Status: DISCONTINUED | OUTPATIENT
Start: 2021-09-10 | End: 2021-09-11

## 2021-09-10 RX ORDER — CHLORHEXIDINE GLUCONATE ORAL RINSE 1.2 MG/ML
10 SOLUTION DENTAL ONCE
Status: COMPLETED | OUTPATIENT
Start: 2021-09-10 | End: 2021-09-10

## 2021-09-10 RX ORDER — SODIUM CHLORIDE, SODIUM LACTATE, POTASSIUM CHLORIDE, CALCIUM CHLORIDE 600; 310; 30; 20 MG/100ML; MG/100ML; MG/100ML; MG/100ML
INJECTION, SOLUTION INTRAVENOUS CONTINUOUS PRN
Status: DISCONTINUED | OUTPATIENT
Start: 2021-09-10 | End: 2021-09-10

## 2021-09-10 RX ORDER — AMOXICILLIN 250 MG
1 CAPSULE ORAL 2 TIMES DAILY
Status: DISCONTINUED | OUTPATIENT
Start: 2021-09-10 | End: 2021-09-17 | Stop reason: HOSPADM

## 2021-09-10 RX ORDER — HEPARIN SODIUM 5000 [USP'U]/.5ML
5000 INJECTION, SOLUTION INTRAVENOUS; SUBCUTANEOUS EVERY 8 HOURS
Status: DISCONTINUED | OUTPATIENT
Start: 2021-09-11 | End: 2021-09-17 | Stop reason: HOSPADM

## 2021-09-10 RX ORDER — LIDOCAINE 40 MG/G
CREAM TOPICAL
Status: DISCONTINUED | OUTPATIENT
Start: 2021-09-10 | End: 2021-09-10 | Stop reason: HOSPADM

## 2021-09-10 RX ORDER — GABAPENTIN 100 MG/1
100 CAPSULE ORAL AT BEDTIME
Status: DISCONTINUED | OUTPATIENT
Start: 2021-09-10 | End: 2021-09-17 | Stop reason: HOSPADM

## 2021-09-10 RX ORDER — HYDROMORPHONE HCL IN WATER/PF 6 MG/30 ML
0.4 PATIENT CONTROLLED ANALGESIA SYRINGE INTRAVENOUS
Status: DISCONTINUED | OUTPATIENT
Start: 2021-09-10 | End: 2021-09-14

## 2021-09-10 RX ORDER — ASPIRIN 81 MG/1
162 TABLET, CHEWABLE ORAL
Status: COMPLETED | OUTPATIENT
Start: 2021-09-10 | End: 2021-09-10

## 2021-09-10 RX ORDER — OXYCODONE HYDROCHLORIDE 5 MG/1
10 TABLET ORAL EVERY 4 HOURS PRN
Status: DISCONTINUED | OUTPATIENT
Start: 2021-09-10 | End: 2021-09-14

## 2021-09-10 RX ORDER — ASPIRIN 81 MG/1
162 TABLET, CHEWABLE ORAL DAILY
Status: DISCONTINUED | OUTPATIENT
Start: 2021-09-11 | End: 2021-09-11

## 2021-09-10 RX ORDER — VECURONIUM BROMIDE 1 MG/ML
INJECTION, POWDER, LYOPHILIZED, FOR SOLUTION INTRAVENOUS PRN
Status: DISCONTINUED | OUTPATIENT
Start: 2021-09-10 | End: 2021-09-10

## 2021-09-10 RX ORDER — PROTAMINE SULFATE 10 MG/ML
INJECTION, SOLUTION INTRAVENOUS PRN
Status: DISCONTINUED | OUTPATIENT
Start: 2021-09-10 | End: 2021-09-10

## 2021-09-10 RX ORDER — LIDOCAINE 40 MG/G
CREAM TOPICAL
Status: DISCONTINUED | OUTPATIENT
Start: 2021-09-10 | End: 2021-09-17 | Stop reason: HOSPADM

## 2021-09-10 RX ORDER — PAPAVERINE HYDROCHLORIDE 30 MG/ML
INJECTION INTRAMUSCULAR; INTRAVENOUS PRN
Status: DISCONTINUED | OUTPATIENT
Start: 2021-09-10 | End: 2021-09-10 | Stop reason: HOSPADM

## 2021-09-10 RX ORDER — ACETAMINOPHEN 325 MG/1
650 TABLET ORAL EVERY 4 HOURS PRN
Status: DISCONTINUED | OUTPATIENT
Start: 2021-09-13 | End: 2021-09-17 | Stop reason: HOSPADM

## 2021-09-10 RX ORDER — SODIUM CHLORIDE 9 MG/ML
INJECTION, SOLUTION INTRAVENOUS CONTINUOUS PRN
Status: DISCONTINUED | OUTPATIENT
Start: 2021-09-10 | End: 2021-09-10

## 2021-09-10 RX ORDER — ONDANSETRON 2 MG/ML
4 INJECTION INTRAMUSCULAR; INTRAVENOUS EVERY 6 HOURS PRN
Status: DISCONTINUED | OUTPATIENT
Start: 2021-09-10 | End: 2021-09-17 | Stop reason: HOSPADM

## 2021-09-10 RX ORDER — MUPIROCIN 20 MG/G
0.5 OINTMENT TOPICAL 2 TIMES DAILY
Status: DISCONTINUED | OUTPATIENT
Start: 2021-09-10 | End: 2021-09-10 | Stop reason: CLARIF

## 2021-09-10 RX ORDER — NICOTINE POLACRILEX 4 MG
15-30 LOZENGE BUCCAL
Status: DISCONTINUED | OUTPATIENT
Start: 2021-09-10 | End: 2021-09-17 | Stop reason: HOSPADM

## 2021-09-10 RX ORDER — DEXMEDETOMIDINE HYDROCHLORIDE 4 UG/ML
0.2-0.7 INJECTION, SOLUTION INTRAVENOUS CONTINUOUS
Status: DISCONTINUED | OUTPATIENT
Start: 2021-09-10 | End: 2021-09-11

## 2021-09-10 RX ORDER — ONDANSETRON 4 MG/1
4 TABLET, ORALLY DISINTEGRATING ORAL EVERY 6 HOURS PRN
Status: DISCONTINUED | OUTPATIENT
Start: 2021-09-10 | End: 2021-09-17 | Stop reason: HOSPADM

## 2021-09-10 RX ORDER — PROPOFOL 10 MG/ML
INJECTION, EMULSION INTRAVENOUS PRN
Status: DISCONTINUED | OUTPATIENT
Start: 2021-09-10 | End: 2021-09-10

## 2021-09-10 RX ORDER — SODIUM CHLORIDE 9 MG/ML
INJECTION, SOLUTION INTRAVENOUS CONTINUOUS
Status: DISCONTINUED | OUTPATIENT
Start: 2021-09-10 | End: 2021-09-12

## 2021-09-10 RX ORDER — FAMOTIDINE 20 MG/1
20 TABLET, FILM COATED ORAL
Status: DISCONTINUED | OUTPATIENT
Start: 2021-09-10 | End: 2021-09-10 | Stop reason: HOSPADM

## 2021-09-10 RX ORDER — HEPARIN SODIUM 1000 [USP'U]/ML
INJECTION, SOLUTION INTRAVENOUS; SUBCUTANEOUS PRN
Status: DISCONTINUED | OUTPATIENT
Start: 2021-09-10 | End: 2021-09-10

## 2021-09-10 RX ORDER — OXYCODONE HYDROCHLORIDE 5 MG/1
5 TABLET ORAL EVERY 4 HOURS PRN
Status: DISCONTINUED | OUTPATIENT
Start: 2021-09-10 | End: 2021-09-17 | Stop reason: HOSPADM

## 2021-09-10 RX ORDER — POLYETHYLENE GLYCOL 3350 17 G/17G
17 POWDER, FOR SOLUTION ORAL DAILY
Status: DISCONTINUED | OUTPATIENT
Start: 2021-09-11 | End: 2021-09-17 | Stop reason: HOSPADM

## 2021-09-10 RX ORDER — DEXTROSE MONOHYDRATE, SODIUM CHLORIDE, AND POTASSIUM CHLORIDE 50; 1.49; 4.5 G/1000ML; G/1000ML; G/1000ML
INJECTION, SOLUTION INTRAVENOUS CONTINUOUS
Status: DISCONTINUED | OUTPATIENT
Start: 2021-09-10 | End: 2021-09-14

## 2021-09-10 RX ORDER — ASPIRIN 81 MG/1
81 TABLET, CHEWABLE ORAL
Status: COMPLETED | OUTPATIENT
Start: 2021-09-10 | End: 2021-09-10

## 2021-09-10 RX ORDER — SODIUM CHLORIDE, SODIUM LACTATE, POTASSIUM CHLORIDE, CALCIUM CHLORIDE 600; 310; 30; 20 MG/100ML; MG/100ML; MG/100ML; MG/100ML
INJECTION, SOLUTION INTRAVENOUS CONTINUOUS
Status: DISCONTINUED | OUTPATIENT
Start: 2021-09-10 | End: 2021-09-10 | Stop reason: HOSPADM

## 2021-09-10 RX ORDER — DEXTROSE MONOHYDRATE 25 G/50ML
25-50 INJECTION, SOLUTION INTRAVENOUS
Status: DISCONTINUED | OUTPATIENT
Start: 2021-09-10 | End: 2021-09-17 | Stop reason: HOSPADM

## 2021-09-10 RX ORDER — EPHEDRINE SULFATE 50 MG/ML
INJECTION, SOLUTION INTRAMUSCULAR; INTRAVENOUS; SUBCUTANEOUS PRN
Status: DISCONTINUED | OUTPATIENT
Start: 2021-09-10 | End: 2021-09-10

## 2021-09-10 RX ORDER — PANTOPRAZOLE SODIUM 40 MG/1
40 TABLET, DELAYED RELEASE ORAL DAILY
Status: DISCONTINUED | OUTPATIENT
Start: 2021-09-11 | End: 2021-09-17 | Stop reason: HOSPADM

## 2021-09-10 RX ADMIN — MIDAZOLAM 2 MG: 1 INJECTION INTRAMUSCULAR; INTRAVENOUS at 12:56

## 2021-09-10 RX ADMIN — POTASSIUM CHLORIDE, DEXTROSE MONOHYDRATE AND SODIUM CHLORIDE: 150; 5; 450 INJECTION, SOLUTION INTRAVENOUS at 19:05

## 2021-09-10 RX ADMIN — VECURONIUM BROMIDE 2 MG: 1 INJECTION, POWDER, LYOPHILIZED, FOR SOLUTION INTRAVENOUS at 12:49

## 2021-09-10 RX ADMIN — PROPOFOL 45 MCG/KG/MIN: 10 INJECTION, EMULSION INTRAVENOUS at 19:47

## 2021-09-10 RX ADMIN — PHENYLEPHRINE HYDROCHLORIDE 100 MCG: 10 INJECTION INTRAVENOUS at 13:00

## 2021-09-10 RX ADMIN — SODIUM CHLORIDE: 9 INJECTION, SOLUTION INTRAVENOUS at 15:53

## 2021-09-10 RX ADMIN — SODIUM CHLORIDE 1 UNITS/HR: 9 INJECTION, SOLUTION INTRAVENOUS at 23:56

## 2021-09-10 RX ADMIN — DEXMEDETOMIDINE HYDROCHLORIDE 0.2 MCG/KG/HR: 400 INJECTION INTRAVENOUS at 22:10

## 2021-09-10 RX ADMIN — VECURONIUM BROMIDE 3 MG: 1 INJECTION, POWDER, LYOPHILIZED, FOR SOLUTION INTRAVENOUS at 12:44

## 2021-09-10 RX ADMIN — PHENYLEPHRINE HYDROCHLORIDE 100 MCG: 10 INJECTION INTRAVENOUS at 16:05

## 2021-09-10 RX ADMIN — PHENYLEPHRINE HYDROCHLORIDE 100 MCG: 10 INJECTION INTRAVENOUS at 16:55

## 2021-09-10 RX ADMIN — SODIUM CHLORIDE: 9 INJECTION, SOLUTION INTRAVENOUS at 11:53

## 2021-09-10 RX ADMIN — CHLORHEXIDINE GLUCONATE 10 ML: 1.2 SOLUTION ORAL at 09:15

## 2021-09-10 RX ADMIN — FENTANYL CITRATE 50 MCG: 50 INJECTION, SOLUTION INTRAMUSCULAR; INTRAVENOUS at 11:18

## 2021-09-10 RX ADMIN — CARVEDILOL 12.5 MG: 12.5 TABLET, FILM COATED ORAL at 09:14

## 2021-09-10 RX ADMIN — CHLORHEXIDINE GLUCONATE 15 ML: 1.2 SOLUTION ORAL at 19:54

## 2021-09-10 RX ADMIN — ASPIRIN 81 MG CHEWABLE TABLET 162 MG: 81 TABLET CHEWABLE at 09:13

## 2021-09-10 RX ADMIN — ROCURONIUM BROMIDE 40 MG: 10 INJECTION INTRAVENOUS at 12:05

## 2021-09-10 RX ADMIN — ALBUMIN HUMAN: 0.05 INJECTION, SOLUTION INTRAVENOUS at 16:05

## 2021-09-10 RX ADMIN — PROTAMINE SULFATE 300 MG: 10 INJECTION, SOLUTION INTRAVENOUS at 15:46

## 2021-09-10 RX ADMIN — DEXMEDETOMIDINE HYDROCHLORIDE 0.3 MCG/KG/HR: 100 INJECTION, SOLUTION INTRAVENOUS at 12:04

## 2021-09-10 RX ADMIN — PHENYLEPHRINE HYDROCHLORIDE 100 MCG: 10 INJECTION INTRAVENOUS at 16:49

## 2021-09-10 RX ADMIN — SODIUM CHLORIDE, POTASSIUM CHLORIDE, SODIUM LACTATE AND CALCIUM CHLORIDE: 600; 310; 30; 20 INJECTION, SOLUTION INTRAVENOUS at 11:53

## 2021-09-10 RX ADMIN — CEFAZOLIN SODIUM 2 G: 2 INJECTION, SOLUTION INTRAVENOUS at 12:04

## 2021-09-10 RX ADMIN — HEPARIN SODIUM 39000 UNITS: 1000 INJECTION INTRAVENOUS; SUBCUTANEOUS at 13:41

## 2021-09-10 RX ADMIN — PROPOFOL 50 MG: 10 INJECTION, EMULSION INTRAVENOUS at 12:04

## 2021-09-10 RX ADMIN — VECURONIUM BROMIDE 2 MG: 1 INJECTION, POWDER, LYOPHILIZED, FOR SOLUTION INTRAVENOUS at 15:01

## 2021-09-10 RX ADMIN — Medication 2.5 MG: at 13:11

## 2021-09-10 RX ADMIN — HEPARIN SODIUM 5000 UNITS: 5000 INJECTION INTRAVENOUS; SUBCUTANEOUS at 06:05

## 2021-09-10 RX ADMIN — PHENYLEPHRINE HYDROCHLORIDE 100 MCG: 10 INJECTION INTRAVENOUS at 14:12

## 2021-09-10 RX ADMIN — FENTANYL CITRATE 100 MCG: 50 INJECTION, SOLUTION INTRAMUSCULAR; INTRAVENOUS at 16:33

## 2021-09-10 RX ADMIN — MIDAZOLAM HYDROCHLORIDE 1 MG: 1 INJECTION, SOLUTION INTRAMUSCULAR; INTRAVENOUS at 11:16

## 2021-09-10 RX ADMIN — SODIUM CHLORIDE, POTASSIUM CHLORIDE, SODIUM LACTATE AND CALCIUM CHLORIDE: 600; 310; 30; 20 INJECTION, SOLUTION INTRAVENOUS at 11:27

## 2021-09-10 RX ADMIN — FENTANYL CITRATE 100 MCG: 50 INJECTION, SOLUTION INTRAMUSCULAR; INTRAVENOUS at 17:33

## 2021-09-10 RX ADMIN — HYDROMORPHONE HYDROCHLORIDE 0.2 MG: 0.2 INJECTION, SOLUTION INTRAMUSCULAR; INTRAVENOUS; SUBCUTANEOUS at 20:24

## 2021-09-10 RX ADMIN — Medication 2.5 MG: at 13:17

## 2021-09-10 RX ADMIN — PANTOPRAZOLE SODIUM 40 MG: 40 INJECTION, POWDER, FOR SOLUTION INTRAVENOUS at 09:12

## 2021-09-10 RX ADMIN — CEFAZOLIN SODIUM 2 G: 2 INJECTION, SOLUTION INTRAVENOUS at 16:04

## 2021-09-10 RX ADMIN — CEFAZOLIN SODIUM 2 G: 2 INJECTION, SOLUTION INTRAVENOUS at 23:25

## 2021-09-10 RX ADMIN — FENTANYL CITRATE 250 MCG: 50 INJECTION, SOLUTION INTRAMUSCULAR; INTRAVENOUS at 12:04

## 2021-09-10 RX ADMIN — FENTANYL CITRATE 150 MCG: 50 INJECTION, SOLUTION INTRAMUSCULAR; INTRAVENOUS at 12:51

## 2021-09-10 RX ADMIN — EPINEPHRINE 0.03 MCG/KG/MIN: 1 INJECTION INTRAMUSCULAR; INTRAVENOUS; SUBCUTANEOUS at 15:32

## 2021-09-10 RX ADMIN — VECURONIUM BROMIDE 2 MG: 1 INJECTION, POWDER, LYOPHILIZED, FOR SOLUTION INTRAVENOUS at 14:18

## 2021-09-10 RX ADMIN — SODIUM CHLORIDE: 9 INJECTION, SOLUTION INTRAVENOUS at 20:30

## 2021-09-10 RX ADMIN — PHENYLEPHRINE HYDROCHLORIDE 100 MCG: 10 INJECTION INTRAVENOUS at 14:14

## 2021-09-10 RX ADMIN — PHENYLEPHRINE HYDROCHLORIDE 100 MCG: 10 INJECTION INTRAVENOUS at 15:49

## 2021-09-10 RX ADMIN — PROPOFOL 50 MCG/KG/MIN: 10 INJECTION, EMULSION INTRAVENOUS at 17:07

## 2021-09-10 RX ADMIN — PHENYLEPHRINE HYDROCHLORIDE 0.5 MCG/KG/MIN: 10 INJECTION INTRAVENOUS at 13:00

## 2021-09-10 RX ADMIN — AMINOCAPROIC ACID 5 G/HR: 250 INJECTION, SOLUTION INTRAVENOUS at 12:24

## 2021-09-10 RX ADMIN — VECURONIUM BROMIDE 1 MG: 1 INJECTION, POWDER, LYOPHILIZED, FOR SOLUTION INTRAVENOUS at 16:10

## 2021-09-10 RX ADMIN — PHENYLEPHRINE HYDROCHLORIDE 100 MCG: 10 INJECTION INTRAVENOUS at 16:02

## 2021-09-10 RX ADMIN — ROCURONIUM BROMIDE 10 MG: 10 INJECTION INTRAVENOUS at 12:44

## 2021-09-10 RX ADMIN — Medication 5 MG: at 13:07

## 2021-09-10 ASSESSMENT — LIFESTYLE VARIABLES: TOBACCO_USE: 1

## 2021-09-10 ASSESSMENT — ACTIVITIES OF DAILY LIVING (ADL)
ADLS_ACUITY_SCORE: 16

## 2021-09-10 ASSESSMENT — MIFFLIN-ST. JEOR: SCORE: 1682.83

## 2021-09-10 NOTE — ANESTHESIA PROCEDURE NOTES
Arterial Line Procedure Note  Pre-Procedure   Staff -        Anesthesiologist:  Nemyar Weaver MD       Performed By: Anesthesiologist       Location: pre-op       Pre-Anesthestic Checklist: patient identified, IV checked, site marked, risks and benefits discussed, informed consent, monitors and equipment checked, pre-op evaluation and at physician/surgeon's request  Timeout:       Correct Patient: Yes        Correct Procedure: Yes        Correct Site: Yes        Correct Position: Yes   Procedure   Procedure: arterial line       Laterality: right       Insertion Site: radial (Radial).  Sterile Prep        All elements of maximal sterile barrier technique followed       Patient Prep/Sterile Barriers: hand hygiene, sterile gloves, hat, mask, draped, sterile gown, draped       Skin prep: Chloraprep  Insertion/Injection        Technique: Seldinger Technique        Catheter Type/Size: 20 gauge, 1.75 in/4.5 cm quick cath (integral wire)  Narrative         Secured by: suture       Tegaderm dressing used.       Arterial waveform: Yes        IBP within 10% of NIBP: Yes

## 2021-09-10 NOTE — PLAN OF CARE
A&O x 4. VSS. RA. Tele: SR. Denies pain/SOB. NPO @ midnight for possible CABG today. Up independent. Will continue w/plan of care.

## 2021-09-10 NOTE — ANESTHESIA PREPROCEDURE EVALUATION
Anesthesia Pre-Procedure Evaluation    Patient: Dragan Prasad   MRN: [id number] : 1935        Preoperative Diagnosis: Coronary artery disease involving native coronary artery of native heart without angina pectoris [I25.10]   Procedure : Procedure(s):  CORONARY ARTERY BYPASS GRAFT (CABG)     Past Medical History:   Diagnosis Date     Basal cell carcinoma      Diverticula of colon      Respiratory complications       Past Surgical History:   Procedure Laterality Date     ARTHROSCOPY OF JOINT UNLISTED      right knee about      C APPENDECTOMY       CV CORONARY ANGIOGRAM N/A 2021    Procedure: Coronary Angiogram;  Surgeon: Loretta Rosario MD;  Location:  HEART CARDIAC CATH LAB     CV LEFT HEART CATH N/A 2021    Procedure: Left Heart Cath;  Surgeon: Loretta Rosario MD;  Location:  HEART CARDIAC CATH LAB     SURGICAL HISTORY OF -   02    Basal cell carcinoma w/positive margins, right  eyebrow & eyelid      No Known Allergies   Social History     Tobacco Use     Smoking status: Former Smoker     Types: Cigarettes     Quit date: 1986     Years since quittin.7     Smokeless tobacco: Never Used   Substance Use Topics     Alcohol use: No      Wt Readings from Last 1 Encounters:   09/10/21 97.1 kg (214 lb)        Anesthesia Evaluation            ROS/MED HX  ENT/Pulmonary:     (+) tobacco use, Past use,  (-) sleep apnea   Neurologic:       Cardiovascular: Comment: AAA    (+) Dyslipidemia hypertension--CAD ---    METS/Exercise Tolerance:     Hematologic:       Musculoskeletal:       GI/Hepatic:       Renal/Genitourinary:       Endo:       Psychiatric/Substance Use:       Infectious Disease:       Malignancy:   (+) Malignancy, History of Skin.    Other:            Physical Exam    Airway        Mallampati: III   TM distance: > 3 FB   Neck ROM: full   Mouth opening: > 3 cm    Respiratory Devices and Support         Dental       (+) upper dentures and lower  dentures      Cardiovascular   cardiovascular exam normal          Pulmonary   pulmonary exam normal                OUTSIDE LABS:  CBC:   Lab Results   Component Value Date    WBC 9.6 09/09/2021    WBC 7.6 09/08/2021    HGB 14.1 09/09/2021    HGB 14.9 09/08/2021    HCT 43.9 09/09/2021    HCT 45.4 09/08/2021     09/10/2021     09/09/2021     BMP:   Lab Results   Component Value Date     09/10/2021     09/09/2021    POTASSIUM 4.1 09/10/2021    POTASSIUM 4.3 09/09/2021    CHLORIDE 113 (H) 09/10/2021    CHLORIDE 112 (H) 09/09/2021    CO2 25 09/10/2021    CO2 26 09/09/2021    BUN 21 09/10/2021    BUN 20 09/09/2021    CR 1.38 (H) 09/10/2021    CR 1.39 (H) 09/09/2021    GLC 95 09/10/2021    GLC 95 09/09/2021     COAGS:   Lab Results   Component Value Date    PTT 32 09/07/2021    INR 1.05 09/07/2021     POC: No results found for: BGM, HCG, HCGS  HEPATIC:   Lab Results   Component Value Date    ALBUMIN 3.4 09/07/2021    PROTTOTAL 7.3 09/07/2021    ALT 57 09/07/2021    AST 44 09/07/2021    ALKPHOS 71 09/07/2021    BILITOTAL 0.5 09/07/2021     OTHER:   Lab Results   Component Value Date    PH 7.35 09/07/2021    A1C 5.8 (H) 09/07/2021    ARELI 8.2 (L) 09/10/2021    PHOS 2.8 09/07/2021    MAG 2.0 09/07/2021    TSH 1.43 03/18/2011       Anesthesia Plan    ASA Status:  4   NPO Status:  NPO Appropriate    Anesthesia Type: General.     - Airway: ETT   Induction: Intravenous.   Maintenance: Inhalation.   Techniques and Equipment:     - Lines/Monitors: Arterial Line, Central Line, MANINDER, CVP            MANINDER Absolute Contra-indication: NONE            MANINDER Relative Contra-indication: NONE     - Blood: Blood in Room     - Drips/Meds: Dexmed. infusion     Consents    Anesthesia Plan(s) and associated risks, benefits, and realistic alternatives discussed. Questions answered and patient/representative(s) expressed understanding.     - Discussed with:  Patient      - Extended Intubation/Ventilatory Support Discussed:  Yes.      - Patient is DNR/DNI Status: No    Use of blood products discussed: Yes.     - Consented: consented to blood products            Reason for refusal: other.     Postoperative Care    Pain management: IV analgesics, Oral pain medications.   PONV prophylaxis: Ondansetron (or other 5HT-3)     Comments:                Neymar Weaver MD

## 2021-09-10 NOTE — PROGRESS NOTES
Cardiac Surgery Progress Note    I visited Mr. Prasad and discussed our plan for surgical revascularization. He has severe coronary artery disease that was found on preop evaluation for his abdominal aortic aneurysm. His cardiac disease will take priority as he has significant disease. I discussed risks, benefits, alternatives and potential complications in detail. He asked appropriate questions and was satisfied with answers. Will proceed on 9/10 in the afternoon due to ICU room availability.    Thank you,    Zachary Pelayo MD  Cardiothoracic Surgery  [phone]

## 2021-09-10 NOTE — ANESTHESIA PROCEDURE NOTES
Central Line/PA Catheter Placement  Pre-Procedure   Staff -        Anesthesiologist:  Neymar Weaver MD       Performed By: Anesthesiologist       Location: pre-op       Pre-Anesthestic Checklist: patient identified, IV checked, site marked, risks and benefits discussed, informed consent, monitors and equipment checked, pre-op evaluation and at physician/surgeon's request  Timeout:       Correct Patient: Yes        Correct Procedure: Yes        Correct Site: Yes        Correct Position: Yes        Correct Laterality: Yes     Procedure   Procedure: central line, new line and elective       Laterality: right       Insertion Site: right, internal jugular.       Patient Position: Trendelenburg  Sterile Prep        All elements of maximal sterile barrier technique followed       Patient Prep/Sterile Barriers: draped, hand hygiene, gloves , hat , mask , draped, gown, sterile gel and probe cover       Skin prep: Chloraprep  Insertion/Injection        Local skin infiltrated with 5 mL of 1% lidocaine.        Technique: ultrasound guided and Seldinger Technique        1. Ultrasound was used to evaluate the access site.       2. Vein evaluated via ultrasound for patency/adequacy.       3. Using real-time ultrasound the needle/catheter was observed entering the artery/vein.       4. Permanent image was captured and entered into the patient's record.       5. The visualized structures were anatomically normal.       6. There were no apparent abnormal pathologic findings.       Catheter size: 9 Fr Cordis.  Narrative         Secured by: suture       Tegaderm and Biopatch dressing used.       blood aspirated from all lumens,        All lumens flushed: Yes       Verification method: Ultrasound, Placement to be verified post-op and X-ray (CXR in ICU)  Comments:  Ultrasound Interpretation, central venous    1. Ultrasound guidance was used to evaluate potential access sites.  2. Ultrasound was also used to verify the patency of  the vessel specified above.   3. Ultrasound was used to visualize the needle entering the vessel.   4. The visualized structures were anatomically normal.  5. There were no apparent abnormal pathological findings.  6. A permanent ultrasound image was saved in the patient's record.

## 2021-09-10 NOTE — ANESTHESIA CARE TRANSFER NOTE
Patient: Dragan Prasad    Procedure(s):  CORONARY ARTERY BYPASS GRAFTING X 3 WITH ENDOSCOPIC VEIN HARVEST LIMA-LAD SV-DISTAL RCA/ OM ON PUMP/MANINDER    Diagnosis: Coronary artery disease involving native coronary artery of native heart without angina pectoris [I25.10]  Diagnosis Additional Information: No value filed.    Anesthesia Type:   General     Note:    Oropharynx: endotracheal tube in place  Level of Consciousness: iatrogenic sedation      Independent Airway: airway patency not satisfactory and stable  Dentition: dentition unchanged  Vital Signs Stable: post-procedure vital signs reviewed and stable  Report to RN Given: handoff report given  Patient transferred to: ICU  Comments: Patient connected to SpO2, EKG, and arterial blood pressure transport monitors and accompanied by CRNA, anesthesiologist, surgeon (attending), surgeon (fellow) to ICU room. Patient ventilated by CRNA with ambu via ETT with O2 at 15 liters per minute during transport.     On arrival to ICU, endotracheal tube position unchanged, equal, bilateral breath sounds auscultated in ICU room, patient placed on ICU ventilator by respiratory therapist, teeth and oral mucosa intact and unchanged at handoff of care. At anesthesia handoff of care, clinical monitors and alarms on and functioning, report on patient's clinical status given to ICU RN, report on patient's clinical status given to intensivist, ICU staff questions answered.  ICU Handoff: Call for PAUSE to initiate/utilize ICU HANDOFF, Identified Patient, Identified Responsible Provider, Reviewed the Pertinent Medical History, Discussed Surgical Course, Reviewed Intra-OP Anesthesia Management and Issues during Anesthesia, Set Expectations for Post Procedure Period and Allowed Opportunity for Questions and Acknowledgement of Understanding      Vitals:  Vitals Value Taken Time   /83 09/10/21 1809   Temp     Pulse 86 09/10/21 1811   Resp 17 09/10/21 1811   SpO2 96 % 09/10/21 1811    Vitals shown include unvalidated device data.    Electronically Signed By: LAURA Fallon CRNA  September 10, 2021  6:12 PM

## 2021-09-10 NOTE — BRIEF OP NOTE
Brief Operative Note    Pre-operative diagnosis: Coronary artery disease involving native coronary artery of native heart without angina pectoris [I25.10]  Post-operative diagnosis Same as pre-operative diagnosis    Procedure: Procedure(s):  CORONARY ARTERY BYPASS GRAFTING X 3 WITH ENDOSCOPIC VEIN HARVEST LIMA-LAD SV-DISTAL RCA/ OM ON PUMP/MANINDER  Surgeon: Surgeon(s) and Role:     * Zachary Pelayo MD - Primary     * Kwasi Armendariz PA-C - Assisting     * Luis Carpenter MD - Fellow - Assisting  Anesthesia: General   Estimated blood loss: 1000 ml  Drains: 2 meds chest tubes, 1 left pleural chest tube  Specimens:   ID Type Source Tests Collected by Time Destination   A :  Blood Line, arterial CBC WITH PLATELETS, BASIC METABOLIC PANEL, FIBRINOGEN ACTIVITY, PARTIAL THROMBOPLASTIN TIME, INR Javier Warner APRN CRNA 9/10/2021  4:01 PM      Findings:   see op note.  Complications: None.  Implants: * No implants in log *

## 2021-09-10 NOTE — OP NOTE
OPERATIVE DATE: 9/10/2021    PRE-OPERATIVE DIAGNOSIS:  1) Severe multi vessel coronary artery disease  2) Abdominal aortic aneurysm  3) Basal cell carcinoma    POST-OPERATIVE DIAGNOSIS:  1) Severe multi vessel coronary artery disease  2) Abdominal aortic aneurysm  3) Basal cell carcinoma    PROCEDURE:  1) Coronary artery bypass grafting x 3.   - Left internal mammary artery to left anterior descending artery   - Reversed saphenous vein graft to distal RCA   - Reversed saphenous vein graft to obtuse marginal artery  2) Endoscopic vein harvest left lower extremity  3) Transesophageal echocardiogram    SURGEON: Zachary Pelayo MD    FELLOW: Luis Carpenter MD  ASSISTANT: Kwasi Armendariz PAC    ANESTHESIA: GETA    ESTIMATED BLOOD LOSS: 1000cc    OPERATIVE FINDINGS:  1) Ejection fraction: 50%  2) Left internal mammary artery 3mm and excellent flow.  3) Left greater saphenous vein 3.5mm and suitable for bypass.  4) Left anterior descending artery 2mm and pankaj of disease at anastomosis.  5) Distal RCA 2.5mm and free of disease at anastomosis.  6) Obtuse marginal artery 1.75mm and free of disease at anastomosis.    CARDIOPULMONARY BYPASS TIME: 85 minutes    AORTIC CROSS CLAMP TIME: 69 minutes    INDICATIONS:  Mr. Dragan Prasad is a 85 year old male admitted with abdominal aortic aneurysm who was found to have severe multi vessel coronary artery disease.  We were asked to evaluate for surgical revascularization.  Risks and benefits of the operation were explained to the patient and their family including, but not limited to, bleeding, infection, stroke and even death.  They understood these risks and agreed to proceed electively.    OPERATIVE REPORT:  The patient was transferred to the operating room and positioned supine on the OR table.  General anesthesia was initiated by the anesthesia team.  Endotracheal intubation and central venous access was performed by anesthesia.  The patients  neck, chest, abdomen and bilateral  lower extremities were clipped, prepped and draped in sterile fashion.  A pre-procedure time-out was performed confirming the correct patient, correct site and correct procedure.    Simultaneous median sternotomy and left lower extremity skin incisions were made.  Endoscopic vein harvest of the left greater saphenous vein was performed.  The vein was ligated at the proximal and distal ends, harvested from the leg, cannulated in reverse orientation, and flushed with heparinized saline.  Branches of the vein were triply clipped with metal clips.  The vein was then stored in heparinized saline.    Median sternotomy was made with reciprocating saw.  The bone was made hemostatic with cautery and bone wax.  A mammary retractor was placed.  The left pleural space was opened.  The left internal mammary artery was mobilized.  Branches of the artery were clipped with metal clips and divided with cautery.      The patient was given 300 mg/kg IV heparin.  The mammary pedicle was clamped with a tonsil clamp.  The mammary artery was divided with metzenbaum scissors.  There was excellent pulsatile flow from the mammary artery.  This was controlled with a bulldog clamp.  The distal aspect was tied with 0-ethibond tie and double clipped.  The proximal end was spatulated in preparation for anastomosis and flushed with papavarine.      Pledgeted 3-0 ethibond pursestring was made in the ascending aorta.  A 20F EOPA arterial cannula was placed here and connected to the bypass circuit.  A 2-0 ethibond pursestring was made in the right atrial appendage.  A 32-40F three stage venous cannula was placed here and connected to the cardiopulmonary bypass circuit.  A 4-0 prolene pursestring was made in the right atrium.  A stab incision was made here.  A retrograde cardioplegia catheter was placed and connected to the cardioplegia circuit.  Next the aorto-pulmonary window was developed.  A 4-0 prolene pursestring was made in the ascending aorta.   A DLP root vent / antegrade cardioplegia catheter was placed here and connected to the cardioplegia circuit.    Cardiopulmonary bypass was initiated with good flows.  Flow on the circuit was decreased.  A large aortic cross clamp was applied.  Flow on the circuit was resumed.  1000cc of antegrade cold blood cardioplegia was delivered with good diastolic arrest.  An additional 300cc of retrograde cold blood cardioplegia was used to test the retrograde.    We focused our attention on the distal bypass anastomoses next.    The following bypasses were constructed using reversed saphenous vein in end-to-side fashion with running 7-0 prolene:  1) Reversed saphenous vein graft to distal right coronary artery.  2) Reversed saphenous vein graft to obtuse marginal artery.  The anastomoses were tested by flushing with cold blood cardioplegia to ensure hemostasis.  An additional dose of retrograde cold blood cardioplegia was delivered between completion of each anastomosis.    We focused our attention on the proximal vein graft anastomoses next.  The vein grafts were sized to fit to the ascending aorta.  Two additional aortotomie were made with 11-blade knife.  The aortotomies were extended with 4mm punch.  The vein grafts were anastomosed to the ascending aorta in end-to-side fashion using running 6-0 prolene.    Next a rent was made in the left pericardium.  The left internal mammary artery was then anastomosed to the left anterior descending artery in end-to-side fashion using running 7-0 prolene.  The mammary pedicle clamp was removed.  The anastomosis was hemostatic.  The clamp was replaced.  The pedicle was tacked to the epicardium using 6-0 prolene.    A retrograde hot shot of warm blood cardiplegia was delivered.  The clamp on the mammary pedicle was removed.  A bulldog clamp was placed on the proximal vein grafts.  After completion of the hot shot, flow on the bypass circuit was decreased and the aortic cross clamp  was removed.  Flow on the bypass circuit was resumed.  The proximal vein grafts were de-aired using an insulin needle.  The bulldog clamp on the vein grafts was removed.  The retrograde cardioplegia catheter was removed.  The pursestring was tied.  The site was over-sewn with a 4-0 prolene.  The distal anastomoses were inspected and hemostatic.  Ventricular pacing wires were placed and delivered through the anterior abdominal wall and secured to the skin with 0-ethibond stitches.  The patient was shocked at 20J once for ventricular fibrillation and then the patient had sinus rhythm and was paced at 80 bpm.      The left pleural space was suctioned dry.  The lungs were ventilated bilaterally.  MANINDER showed no intra-cardiac air.  The DLP root vent / antegrade cardioplegia catheter was removed.  Intravenous calcium was administered.  Flow on the bypass circuit was weaned to off.  The patient was stable on low dose epinephrine and phenylephrine.  The venous cannula was removed.  Pursestring at this site was tied.  This was over-sewed with 0-ethibond.  The remaining pump blood volume was returned via the arterial cannula.  A test dose of protamine was administered.  The arterial cannula was removed.  The pursestrings at this site were tied.  This was oversewn with pledgeted 4-0 prolene.     The sternal retractor was removed.  Two 32F straight argyle mediastinal chest tubes were placed and one 28F left pleural chest tube was placed.  These were delivered through the anterior abdominal wall and secured to the skin with 0-ethibond stitches.      The wound was made hemostatic with cautery.  The subcutaneous tissue, sternal edges, thymic fat, pericardial edges and all anastomotic and cannulation sites were inspected and hemostatic.    The wound was irrigated with warm antibiotic saline.  The sternum was reapproximated with sternal wires.    The wound was made hemostatic then closed in layers using vicryl stitches.  The  subcutaneous tissue was closed with 2-0 vicryl stitches.  The skin was closed with 3-0 vicryl.  Dermabond skin glue was applied.  A sterile dressing was applied.      The patient was then transferred from the operating bed to an ICU bed and transferred to the ICU in critical, but stable, condition.    All needle, sponge and instrument counts were correct at the end of the case.    Zachary Pelayo MD  Cardiothoracic Surgery  Pager: [phone]

## 2021-09-10 NOTE — PROGRESS NOTES
[facility]    Medicine Progress Note - Hospitalist Service       Date of Admission:  9/7/2021    Assessment & Plan         84 y/o male with PMH of HTN, AAA, abnormal stress test- June 2021- admitted forfurthe management after he was found to have severe multi-vessels CAD.       Severe multi-vessels CAD (coronary artery disease)    Abnormal cardiovascular stress test    Unstable angina pectoris due to coronary arteriosclerosis (H)    Assessment: patient was undergoing pre-op eval for AAA needing EVAR; had an abnormal stress test in 6/2021; a diagnostic angio 09/07 showed 3 vessel CAD. Subsequently he developed severe hypotension SBP 40s and required dopamine drip and phenylephrine boluses, but did stabilize on dopamine drip 5mcg and was transfered to ICU. In the elevator he had vfib and was shocked once back into sinus rhythm. Now HD stable and [place] [facility] [place].  [PTA on ASA prn and Toprol XL 25 mg po daily]  - denies chest pain prior to this hospitalization  - seen by Cardiology  - started on ASA 81 mg po daily, Norvasc 5 mg po daily, Coreg 6.25 mg po BID-->increased to 12.5 mg po BID, Lipitor 40 mg po daily  - lipid profile- , HDL 40; HbA1c 5.8  - cr 1.19--1.39--1.38 today; BMP in am  - CVS consult apreciated  - plan for CABG today  - denies chest pain, no SOB  - no need for heparin drip for now since he is chest pain free    Ischemic cardiomyopathy  - EF 38% on stress test from 6/2021  - looks euvolemic, no leg swelling, lungs are clear to ausculattion  - started on Coreg as above      Benign essential hypertension  - PTA Toprol switched to Coreg   - started on Norvasc 5 mg po daily  - monitor BP      Abdominal aortic aneurysm (H)  - followed as outpatient by Dr Angeles  - CTA abd/pelvis- 5.2021- Infrarenal abdominal aortic aneurysm measuring up to 5.5 cm AP by 4.9 cm transverse, increased from previous 5.4 cm AP x 4.6 cm transverse. Large amount of intramural  thrombus  - EVAR will be deferred until after CV surgery.       Diet: NPO per Anesthesia Guidelines for Procedure/Surgery Except for: Meds    DVT Prophylaxis: Heparin   Maddox Catheter: Not present  Central Lines: None  Code Status: Full Code      Disposition Plan   Expected discharge: 09/14/2021   recommended to prior living arrangement pending postop- CABG course.     The patient's care was discussed with the Bedside Nurse and Patient.    Jackie Bañuelos MD  Hospitalist Service  [facility]  Securely message with the Vocera Web Console (learn more here)  Text page via [employer] Paging/Directory      Clinically Significant Risk Factors Present on Admission               ______________________________________________________________________    Interval History    No events overnight, no chest pain, no SOB  - no N/V, no abd pain  - no dizziness, no headache    Data reviewed today: I reviewed all medications, new labs and imaging results over the last 24 hours. I personally reviewed no images or EKG's today.    Physical Exam   Vital Signs: Temp: 98.3  F (36.8  C) Temp src: Oral BP: 122/73 Pulse: 64   Resp: 20 SpO2: 95 % O2 Device: None (Room air)    Weight: 214 lbs 0 oz    Constitutional: awake, alert, cooperative, no apparent distress.   Eyes: Lids and lashes normal, pupils equal, round and reactive to light   ENT: Normocephalic, without obvious abnormality, atraumatic, sinuses nontender on palpation   Hematologic / Lymphatic: no cervical lymphadenopathy   Respiratory: CTABL   Cardiovascular: RRR with no m/r/g   GI: Normal bowel sounds, soft, non-distended, non-tender.   Skin: normal skin color, texture, turgor   Musculoskeletal: There is no redness, warmth, or swelling of the joints. Full range of motion noted.   Neurologic: Awake, alert, oriented to name, place and time. Cranial nerves II-XII are grossly intact. Motor is 5 out of 5 bilaterally. Sensory is intact.   Neuropsychiatric: normal  mood and affect      Data   Recent Labs   Lab 09/10/21  0600 09/09/21  0750 09/09/21  0506 09/08/21  0904 09/07/21  1617 09/07/21  1306 09/07/21  0710   WBC  --   --  9.6 7.6  --  9.9 8.3   HGB  --   --  14.1 14.9  --  15.0 15.3   MCV  --   --  96 94  --  93 95     --  220 232  --  239 245   INR  --   --   --   --   --   --  1.05     --  139 140  --  142 139   POTASSIUM 4.1  --  4.3 4.4  --  4.3 4.3   CHLORIDE 113*  --  112* 114*  --  115* 111*   CO2 25  --  26 23  --  23 26   BUN 21  --  20 19  --  21 23   CR 1.38*  --  1.39* 1.19  --  1.25 1.36*   ANIONGAP 5  --  1* 3  --  4 2*   ARELI 8.2*  --  8.7 8.7  --  8.8 9.1   GLC 95 95 103* 142*  --  98 92   ALBUMIN  --   --   --   --   --  3.4  --    PROTTOTAL  --   --   --   --   --  7.3  --    BILITOTAL  --   --   --   --   --  0.5  --    ALKPHOS  --   --   --   --   --  71  --    ALT  --   --   --   --   --  57  --    AST  --   --   --   --   --  44  --    TROPONIN  --   --   --   --  0.362* 0.199*  --      Recent Results (from the past 24 hour(s))   XR Chest 2 Views    Narrative    CHEST TWO VIEWS  9/9/2021 2:10 PM     HISTORY: Chest surgery planning.    COMPARISON: June 12, 2019       Impression    IMPRESSION:  There are no acute infiltrates. The cardiac silhouette is  not enlarged. Pulmonary vasculature is unremarkable.     KENYATTA GARSIA MD         SYSTEM ID:  [id number]     Medications     niCARdipine Stopped (09/08/21 0958)       amLODIPine  5 mg Oral Daily     aspirin  162 mg Oral Pre-Op/Pre-procedure x 1 dose    Or     aspirin  81 mg Oral Pre-Op/Pre-procedure x 1 dose     aspirin  81 mg Oral Daily     atorvastatin  40 mg Oral QPM     carvedilol  12.5 mg Oral BID w/meals     chlorhexidine  10 mL Swish & Spit Once     famotidine  20 mg Oral Pre-Op/Pre-procedure x 1 dose     heparin ANTICOAGULANT  5,000 Units Subcutaneous Q8H     metoprolol tartrate  12.5 mg Oral Pre-Op/Pre-procedure x 1 dose     pantoprazole (PROTONIX) IV  40 mg Intravenous Daily with  breakfast

## 2021-09-10 NOTE — ANESTHESIA PROCEDURE NOTES
Airway       Patient location during procedure: OR ([facility] - Operating Room or Procedural Area)       Procedure Start/Stop Times: 9/10/2021 12:11 PM  Staff -        Anesthesiologist:  Ervin Cancino DO       CRNA: Javier Warner APRN CRNA       Performed By: CRNAIndications and Patient Condition       Indications for airway management: santiago-procedural       Induction type:intravenous       Mask difficulty assessment: 3 - difficult mask (inadequate, unstable, or two providers) +/- NMBA (thick beard)    Final Airway Details       Final airway type: endotracheal airway       Successful airway: ETT - single  Endotracheal Airway Details        ETT size (mm): 8.0       Cuffed: yes       Cuff volume (mL): 10       Adjucts: stylet       Position: Right       Measured from: lips       Secured at (cm): 24       Bite block used: None    Post intubation assessment        Number of attempts at approach: 1       Number of other approaches attempted: 0       Secured with: pink tape and plastic tape       Ease of procedure: easy       Dentition: Intact and Unchanged

## 2021-09-10 NOTE — CONSULTS
[facility]  Critical Care Service  Progress Note  Date of Service (when I saw the patient): 09/10/2021  Main Plans for Today    Ventilation management and extubation   Assessment & Plan   Dragan Prasad is a 85 year old male who was admitted on 9/7/2021 for planned Coronary angiogram during which he became hypotensive with V-fib arrest, admitted to ICU for 1 day for observation, stabilized. CABG advised for severe multivessel disease    Neuro  1. Encephalopathy, drug induced  2. Acute pain  3. Sedation  Plan:  -- Scheduled acetaminophen,   -- Propofol for sedation as needed until extubation  -- Delirium prevention as able    CV  1. S/p CAB x 3  2. HTN  3. HLD  Plan:  -- Per pathway  -- Cardiac meds per CV surgery    Resp:  1. Post operative ventilator management  2. Acute respiratory failure  Plan:  -- Current settings are:  Ventilation Mode: CMV/AC  (Continuous Mandatory Ventilation/ Assist Control)  Rate Set (breaths/minute): 16 breaths/min  Tidal Volume Set (mL): 500 mL  PEEP (cm H2O): 5 cmH2O  Oxygen Concentration (%): 60 %  Resp: 20    -- PST when hemodynamically stable    GI/Nutrition  1. No prior hx  Plan:  -- NPO  -- PPI    Renal  Mild renal insufficieny  Baseline creatinine appears to be 1.2  Plan:  --monitor function and electrolytes as needed with replacement per ICU protocols. - generally avoid nephrotoxic agents such as NSAID, IV contrast unless specifically required  -- adjust medications as needed for renal clearance  -- follow I/O's as appropriate.  -- maintain euvolemia    ID  1. No issues  Plan:  Cefazolin ppx    Endocrine  1. Stress Hyperglycemia  Plan:  --  Insulin gtt per protocol if indicated  -- Keep BG  <180 for optimal healing    Heme:  1. Acute blood loss anemia  2. Coagulopathy   Plan:  -- Monitor hemoglobin.  -- Transfuse to keep > 7.0    General cares:  DVT Prophylaxis: Heparin SQ and Pneumatic Compression Devices  GI Prophylaxis: PPI  Restraints: Restraints  for medical healing needed: YES  Family update by me today: Deferred  Current lines are required for patient management  Access:  Freda Campos  Time Spent on this Encounter   Billing:  I spent 30 minutes bedside and on the inpatient unit today managing the critical care of Dragan Prasad in relation to the issues listed in this note.  Interval History   Post CAB x 3  Physical Exam   Temp: 98.3  F (36.8  C) Temp src: Oral Temp  Min: 97.8  F (36.6  C)  Max: 98.3  F (36.8  C) BP: 122/73 Pulse: 64   Resp: 20 SpO2: 95 % O2 Device: None (Room air)    Vitals:    09/08/21 0000 09/09/21 0400 09/10/21 0610   Weight: 96.8 kg (213 lb 6.5 oz) 96.8 kg (213 lb 6.5 oz) 97.1 kg (214 lb)     I/O last 3 completed shifts:  In: -   Out: 150 [Urine:150]    GEN: Sedated  EYES: PERRL, Anicteric sclera.   HEENT:  Normocephalic, atraumatic, trachea midline, ETT secure  CV: RRR, no gallops, rubs, or murmurs  PULM/CHEST: Clear breath sounds bilaterally without rhonchi, crackles or wheeze, symmetric chest rise  GI: normal bowel sounds, soft, non-tender, no rebound tenderness or guarding, no masses  : chandler catheter in place, urine yellow and clear  EXTREMITIES: -ve peripheral edema, moving all extremities, peripheral pulses intact  NEURO: Cranial nerves II-XII grossly intact, no motor-sensory deficits noted  SKIN: No rashes, sores or ulcerations      Data   Recent Labs   Lab 09/10/21  1601 09/10/21  0600 09/09/21  0750 09/09/21  0506 09/08/21  0904 09/07/21  1617 09/07/21  1306 09/07/21  0710   WBC 18.7*  --   --  9.6 7.6  --  9.9 8.3   HGB 10.9*  --   --  14.1 14.9  --  15.0 15.3   MCV 96  --   --  96 94  --  93 95   * 199  --  220 232  --  239 245   INR 1.69*  --   --   --   --   --   --  1.05    143  --  139 140  --  142 139   POTASSIUM 5.1 4.1  --  4.3 4.4  --  4.3 4.3   CHLORIDE 116* 113*  --  112* 114*  --  115* 111*   CO2 24 25  --  26 23  --  23 26   BUN 18 21  --  20 19  --  21 23   CR 1.18 1.38*  --  1.39* 1.19  --   1.25 1.36*   ANIONGAP 4 5  --  1* 3  --  4 2*   ARELI 8.0* 8.2*  --  8.7 8.7  --  8.8 9.1   * 95 95 103* 142*  --  98 92   ALBUMIN  --   --   --   --   --   --  3.4  --    PROTTOTAL  --   --   --   --   --   --  7.3  --    BILITOTAL  --   --   --   --   --   --  0.5  --    ALKPHOS  --   --   --   --   --   --  71  --    ALT  --   --   --   --   --   --  57  --    AST  --   --   --   --   --   --  44  --    TROPONIN  --   --   --   --   --  0.362* 0.199*  --

## 2021-09-10 NOTE — PLAN OF CARE
A+Ox4, VSS on RA. SR. Up in room indep. Denies CP or SOB. Report called to Pre op, wallet and phone given to pt daughter.

## 2021-09-11 ENCOUNTER — APPOINTMENT (OUTPATIENT)
Dept: GENERAL RADIOLOGY | Facility: CLINIC | Age: 86
DRG: 235 | End: 2021-09-11
Attending: STUDENT IN AN ORGANIZED HEALTH CARE EDUCATION/TRAINING PROGRAM
Payer: MEDICARE

## 2021-09-11 ENCOUNTER — APPOINTMENT (OUTPATIENT)
Dept: OCCUPATIONAL THERAPY | Facility: CLINIC | Age: 86
DRG: 235 | End: 2021-09-11
Attending: STUDENT IN AN ORGANIZED HEALTH CARE EDUCATION/TRAINING PROGRAM
Payer: MEDICARE

## 2021-09-11 LAB
BASE EXCESS BLDA CALC-SCNC: -3.8 MMOL/L (ref -9–1.8)
CA-I BLD-MCNC: 4.3 MG/DL (ref 4.4–5.2)
ERYTHROCYTE [DISTWIDTH] IN BLOOD BY AUTOMATED COUNT: 12.9 % (ref 10–15)
GLUCOSE BLDC GLUCOMTR-MCNC: 126 MG/DL (ref 70–99)
GLUCOSE BLDC GLUCOMTR-MCNC: 133 MG/DL (ref 70–99)
GLUCOSE BLDC GLUCOMTR-MCNC: 134 MG/DL (ref 70–99)
GLUCOSE BLDC GLUCOMTR-MCNC: 135 MG/DL (ref 70–99)
GLUCOSE BLDC GLUCOMTR-MCNC: 137 MG/DL (ref 70–99)
GLUCOSE BLDC GLUCOMTR-MCNC: 143 MG/DL (ref 70–99)
GLUCOSE BLDC GLUCOMTR-MCNC: 151 MG/DL (ref 70–99)
GLUCOSE BLDC GLUCOMTR-MCNC: 152 MG/DL (ref 70–99)
GLUCOSE BLDC GLUCOMTR-MCNC: 156 MG/DL (ref 70–99)
GLUCOSE BLDC GLUCOMTR-MCNC: 187 MG/DL (ref 70–99)
HCO3 BLD-SCNC: 22 MMOL/L (ref 21–28)
HCT VFR BLD AUTO: 36.4 % (ref 40–53)
HGB BLD-MCNC: 11.5 G/DL (ref 13.3–17.7)
MAGNESIUM SERPL-MCNC: 2.2 MG/DL (ref 1.6–2.3)
MCH RBC QN AUTO: 30.7 PG (ref 26.5–33)
MCHC RBC AUTO-ENTMCNC: 31.6 G/DL (ref 31.5–36.5)
MCV RBC AUTO: 97 FL (ref 78–100)
O2/TOTAL GAS SETTING VFR VENT: 45 %
OXYHGB MFR BLD: 93 % (ref 92–100)
PCO2 BLD: 40 MM HG (ref 35–45)
PH BLD: 7.35 [PH] (ref 7.35–7.45)
PHOSPHATE SERPL-MCNC: 3.3 MG/DL (ref 2.5–4.5)
PLATELET # BLD AUTO: 145 10E3/UL (ref 150–450)
PO2 BLD: 72 MM HG (ref 80–105)
POTASSIUM BLD-SCNC: 4.7 MMOL/L (ref 3.4–5.3)
RBC # BLD AUTO: 3.75 10E6/UL (ref 4.4–5.9)
WBC # BLD AUTO: 11.9 10E3/UL (ref 4–11)

## 2021-09-11 PROCEDURE — 84132 ASSAY OF SERUM POTASSIUM: CPT | Performed by: INTERNAL MEDICINE

## 2021-09-11 PROCEDURE — 82330 ASSAY OF CALCIUM: CPT | Performed by: STUDENT IN AN ORGANIZED HEALTH CARE EDUCATION/TRAINING PROGRAM

## 2021-09-11 PROCEDURE — P9041 ALBUMIN (HUMAN),5%, 50ML: HCPCS | Performed by: STUDENT IN AN ORGANIZED HEALTH CARE EDUCATION/TRAINING PROGRAM

## 2021-09-11 PROCEDURE — 250N000013 HC RX MED GY IP 250 OP 250 PS 637: Performed by: PHYSICIAN ASSISTANT

## 2021-09-11 PROCEDURE — 71045 X-RAY EXAM CHEST 1 VIEW: CPT

## 2021-09-11 PROCEDURE — 97535 SELF CARE MNGMENT TRAINING: CPT | Mod: GO

## 2021-09-11 PROCEDURE — 250N000011 HC RX IP 250 OP 636: Performed by: SURGERY

## 2021-09-11 PROCEDURE — 84100 ASSAY OF PHOSPHORUS: CPT | Performed by: STUDENT IN AN ORGANIZED HEALTH CARE EDUCATION/TRAINING PROGRAM

## 2021-09-11 PROCEDURE — 250N000011 HC RX IP 250 OP 636: Performed by: PHYSICIAN ASSISTANT

## 2021-09-11 PROCEDURE — 250N000011 HC RX IP 250 OP 636: Performed by: STUDENT IN AN ORGANIZED HEALTH CARE EDUCATION/TRAINING PROGRAM

## 2021-09-11 PROCEDURE — 250N000012 HC RX MED GY IP 250 OP 636 PS 637: Performed by: PHYSICIAN ASSISTANT

## 2021-09-11 PROCEDURE — 97165 OT EVAL LOW COMPLEX 30 MIN: CPT | Mod: GO

## 2021-09-11 PROCEDURE — 93005 ELECTROCARDIOGRAM TRACING: CPT

## 2021-09-11 PROCEDURE — 97110 THERAPEUTIC EXERCISES: CPT | Mod: GO

## 2021-09-11 PROCEDURE — 120N000001 HC R&B MED SURG/OB

## 2021-09-11 PROCEDURE — 85027 COMPLETE CBC AUTOMATED: CPT | Performed by: STUDENT IN AN ORGANIZED HEALTH CARE EDUCATION/TRAINING PROGRAM

## 2021-09-11 PROCEDURE — 250N000013 HC RX MED GY IP 250 OP 250 PS 637: Performed by: STUDENT IN AN ORGANIZED HEALTH CARE EDUCATION/TRAINING PROGRAM

## 2021-09-11 PROCEDURE — 82805 BLOOD GASES W/O2 SATURATION: CPT | Performed by: STUDENT IN AN ORGANIZED HEALTH CARE EDUCATION/TRAINING PROGRAM

## 2021-09-11 PROCEDURE — 83735 ASSAY OF MAGNESIUM: CPT | Performed by: STUDENT IN AN ORGANIZED HEALTH CARE EDUCATION/TRAINING PROGRAM

## 2021-09-11 PROCEDURE — 99207 PR NO CHARGE LOS: CPT | Performed by: STUDENT IN AN ORGANIZED HEALTH CARE EDUCATION/TRAINING PROGRAM

## 2021-09-11 RX ORDER — NITROGLYCERIN 20 MG/100ML
10-200 INJECTION INTRAVENOUS CONTINUOUS
Status: DISCONTINUED | OUTPATIENT
Start: 2021-09-11 | End: 2021-09-11

## 2021-09-11 RX ADMIN — POLYETHYLENE GLYCOL 3350 17 G: 17 POWDER, FOR SOLUTION ORAL at 09:26

## 2021-09-11 RX ADMIN — SENNOSIDES AND DOCUSATE SODIUM 1 TABLET: 8.6; 5 TABLET ORAL at 20:13

## 2021-09-11 RX ADMIN — METOPROLOL TARTRATE 12.5 MG: 25 TABLET, FILM COATED ORAL at 20:13

## 2021-09-11 RX ADMIN — METHOCARBAMOL 500 MG: 500 TABLET ORAL at 03:44

## 2021-09-11 RX ADMIN — ACETAMINOPHEN 975 MG: 325 TABLET, FILM COATED ORAL at 02:43

## 2021-09-11 RX ADMIN — ASPIRIN 81 MG CHEWABLE TABLET 162 MG: 81 TABLET CHEWABLE at 09:26

## 2021-09-11 RX ADMIN — CEFAZOLIN SODIUM 2 G: 2 INJECTION, SOLUTION INTRAVENOUS at 17:55

## 2021-09-11 RX ADMIN — ATORVASTATIN CALCIUM 40 MG: 40 TABLET, FILM COATED ORAL at 20:13

## 2021-09-11 RX ADMIN — INSULIN ASPART 1 UNITS: 100 INJECTION, SOLUTION INTRAVENOUS; SUBCUTANEOUS at 12:41

## 2021-09-11 RX ADMIN — GABAPENTIN 100 MG: 100 CAPSULE ORAL at 20:26

## 2021-09-11 RX ADMIN — HEPARIN SODIUM 5000 UNITS: 5000 INJECTION INTRAVENOUS; SUBCUTANEOUS at 17:55

## 2021-09-11 RX ADMIN — OXYCODONE HYDROCHLORIDE 5 MG: 5 TABLET ORAL at 09:24

## 2021-09-11 RX ADMIN — METHOCARBAMOL 500 MG: 500 TABLET ORAL at 17:56

## 2021-09-11 RX ADMIN — PANTOPRAZOLE SODIUM 40 MG: 40 TABLET, DELAYED RELEASE ORAL at 09:26

## 2021-09-11 RX ADMIN — HYDROMORPHONE HYDROCHLORIDE 0.2 MG: 0.2 INJECTION, SOLUTION INTRAMUSCULAR; INTRAVENOUS; SUBCUTANEOUS at 00:03

## 2021-09-11 RX ADMIN — HYDROMORPHONE HYDROCHLORIDE 0.2 MG: 0.2 INJECTION, SOLUTION INTRAMUSCULAR; INTRAVENOUS; SUBCUTANEOUS at 10:03

## 2021-09-11 RX ADMIN — ALBUMIN HUMAN 500 ML: 0.05 INJECTION, SOLUTION INTRAVENOUS at 05:09

## 2021-09-11 RX ADMIN — CEFAZOLIN SODIUM 2 G: 2 INJECTION, SOLUTION INTRAVENOUS at 08:02

## 2021-09-11 RX ADMIN — ACETAMINOPHEN 975 MG: 325 TABLET, FILM COATED ORAL at 17:56

## 2021-09-11 RX ADMIN — METHOCARBAMOL 500 MG: 500 TABLET ORAL at 11:32

## 2021-09-11 RX ADMIN — ACETAMINOPHEN 975 MG: 325 TABLET, FILM COATED ORAL at 09:58

## 2021-09-11 RX ADMIN — NITROGLYCERIN 10 MCG/MIN: 20 INJECTION INTRAVENOUS at 00:32

## 2021-09-11 RX ADMIN — OXYCODONE HYDROCHLORIDE 5 MG: 5 TABLET ORAL at 20:26

## 2021-09-11 RX ADMIN — HYDROMORPHONE HYDROCHLORIDE 0.2 MG: 0.2 INJECTION, SOLUTION INTRAMUSCULAR; INTRAVENOUS; SUBCUTANEOUS at 07:08

## 2021-09-11 RX ADMIN — OXYCODONE HYDROCHLORIDE 5 MG: 5 TABLET ORAL at 05:25

## 2021-09-11 RX ADMIN — SENNOSIDES AND DOCUSATE SODIUM 1 TABLET: 8.6; 5 TABLET ORAL at 09:26

## 2021-09-11 ASSESSMENT — ACTIVITIES OF DAILY LIVING (ADL)
ADLS_ACUITY_SCORE: 18
PREVIOUS_RESPONSIBILITIES: MEAL PREP;HOUSEKEEPING;LAUNDRY;DRIVING
ADLS_ACUITY_SCORE: 18
ADLS_ACUITY_SCORE: 16
ADLS_ACUITY_SCORE: 16
ADLS_ACUITY_SCORE: 18
ADLS_ACUITY_SCORE: 18

## 2021-09-11 ASSESSMENT — MIFFLIN-ST. JEOR: SCORE: 1733.13

## 2021-09-11 NOTE — PROGRESS NOTES
Dragan Prasad is a 85 year old male who was admitted on 9/7/2021 for planned Coronary angiogram during which he became hypotensive with V-fib arrest, admitted to ICU, underwent CAB x 3. Now extubated per pathway, on NC, off all vasoactive support agents.     The ICU service will sign off. Please contact us if we can be of any assistance.     Naomi De La Mater

## 2021-09-11 NOTE — CONSULTS
CARDIAC SURGERY NUTRITION CONSULT    Received standing order to assess and educate patient.    Will follow and complete assessment once patient is extubated and/or is transferred to medical unit.    Patient will receive nutrition education during the Outpatient Cardiac Rehab Program (nutrition classes/dietitian counseling).    Awilda Agudelo RD, LD, [facility]   Clinical Dietitian - [facility]

## 2021-09-11 NOTE — PROGRESS NOTES
Patient seen and care plan discussed with Dr. Pelayo    [facility]  Cardiovascular and Thoracic Surgery Daily Note          Assessment and Plan:   POD#1 s/p Coronary artery bypass grafting x 3, Left internal mammary artery to left anterior descending artery, Reversed saphenous vein graft to distal RCA, Reversed saphenous vein graft to obtuse marginal artery, Endoscopic vein harvest left lower extremity, Transesophageal echocardiogram by Dr. Zachary Pelayo  -CVS: HR: 70s-80s. SBP: 90s-110s. Pre op EF: 45-50%. Weaned off pressors. ASA, BB with hold parameters, statin. Pacer wires capped. Chest tubes to suction  -Resp: Extubated within protocol. Saturating well on 4L. Continue to wean as able. Continue to encourage IS, cough, deep breathing, ambulation.   -Neuro:  Grossly intact. Pain controlled.   -Renal: good UO, up about 2kg from preoperative weight. Cr: 1.29. Will hold diuretics today.   Creatinine   Date Value Ref Range Status   09/10/2021 1.29 (H) 0.66 - 1.25 mg/dL Final   2017 1.03 0.66 - 1.25 mg/dL Final   ]  -GI: Tolerating diet. No BM. Continue bowel regimen  -:  Maddox in place, d/t limited mobility and need for accurate I&Os.  -Endo: pre op a1c: 5.8. Minimal insulin infusion requirements. Will transition to sliding scale insulin per protocol.   -FEN: replete lytes as needed, ADAT. Na: 144. K: 4.9  Orders Placed This Encounter      NPO for Medical/Clinical Reasons Except for: Meds      Advance Diet as Tolerated: Clear Liquid Diet      Advance Diet as Tolerated: Clear Liquid Diet; Low Saturated Fat Na <2400mg Diet      Advance Diet as Tolerated: Clear Liquid Diet      Advance Diet as Tolerated: Full Liquid Diet; Low Saturated Fat Na <2400mg Diet    -ID: Temp (24hrs), Av.6  F (37  C), Min:97.5  F (36.4  C), Max:99.5  F (37.5  C)   Completing perioperative abx. Leukocytosis likely related to stress from surgery.   WBC   Date Value Ref Range Status   2011 8.8 4.0 - 11.0  "10e9/L Final     WBC Count   Date Value Ref Range Status   09/11/2021 11.9 (H) 4.0 - 11.0 10e3/uL Final   ]  -Heme: plt: 145. Acute blood loss anemia and thrombocytopenia related to surgery.   Hemoglobin   Date Value Ref Range Status   09/11/2021 11.5 (L) 13.3 - 17.7 g/dL Final   03/09/2011 14.5 13.3 - 17.7 g/dL Final   ],   -Proph: PCD, ASA, BB, statin, PPI, sub q heparin  -Dispo: Transfer [facility]. Initiate therapies. Continue chest tubes and chandler catheter today. Wean oxygen as able. Continue to encourage IS, cough, deep breathing, ambulation.           Interval History:   No acute events overnight. Saturating well on 4L. Pain controlled. Tolerating diet. No BM, no Flatus         Medications:       acetaminophen  975 mg Oral Q8H     aspirin  162 mg Oral or NG Tube Daily     atorvastatin  40 mg Oral QPM     ceFAZolin  2 g Intravenous Q8H     gabapentin  100 mg Oral At Bedtime     heparin ANTICOAGULANT  5,000 Units Subcutaneous Q8H     insulin aspart  1-7 Units Subcutaneous TID AC     insulin aspart  1-5 Units Subcutaneous At Bedtime     pantoprazole  40 mg Oral or NG Tube Daily    Or     pantoprazole  40 mg Oral Daily     polyethylene glycol  17 g Oral Daily     senna-docusate  1 tablet Oral BID     sodium chloride (PF)  3 mL Intracatheter Q8H     [START ON 9/13/2021] acetaminophen, bisacodyl, glucose **OR** dextrose **OR** glucagon, EPINEPHrine, hydrALAZINE, HYDROmorphone **OR** HYDROmorphone, lidocaine 4%, lidocaine (buffered or not buffered), magnesium hydroxide, methocarbamol, naloxone **OR** naloxone **OR** naloxone **OR** naloxone, ondansetron **OR** ondansetron, oxyCODONE **OR** oxyCODONE, phenylephrine, BETA BLOCKER NOT PRESCRIBED, sodium chloride (PF)          Physical Exam:   Vitals were reviewed  Blood pressure 111/74, pulse 83, temperature (P) 98.9  F (37.2  C), temperature source (P) Oral, resp. rate 18, height 1.803 m (5' 11\"), weight 102.1 kg (225 lb 1.4 oz), SpO2 (!) 81 %.  Rhythm: NSR    Lungs: " diminished bases    Cardiovascular: RRR normal s1 and s2    Abdomen: soft NTND    Extremeties: minimal edema    Incision: CDI    CT: to suction    Weight:   Vitals:    09/07/21 0641 09/08/21 0000 09/09/21 0400 09/10/21 0610   Weight: 100.4 kg (221 lb 6.4 oz) 96.8 kg (213 lb 6.5 oz) 96.8 kg (213 lb 6.5 oz) 97.1 kg (214 lb)    09/11/21 0630   Weight: 102.1 kg (225 lb 1.4 oz)            Data:   Labs:   Lab Results   Component Value Date    WBC 11.9 09/11/2021    WBC 8.8 03/09/2011     Lab Results   Component Value Date    RBC 3.75 09/11/2021    RBC 4.72 03/09/2011     Lab Results   Component Value Date    HGB 11.5 09/11/2021    HGB 14.5 03/09/2011     Lab Results   Component Value Date    HCT 36.4 09/11/2021    HCT 43.5 03/09/2011     No components found for: MCT  Lab Results   Component Value Date    MCV 97 09/11/2021    MCV 92 03/09/2011     Lab Results   Component Value Date    MCH 30.7 09/11/2021    MCH 30.7 03/09/2011     Lab Results   Component Value Date    MCHC 31.6 09/11/2021    MCHC 33.3 03/09/2011     Lab Results   Component Value Date    RDW 12.9 09/11/2021    RDW 13.2 03/09/2011     Lab Results   Component Value Date     09/11/2021     03/09/2011       Last Basic Metabolic Panel:  Lab Results   Component Value Date     09/10/2021     06/06/2017      Lab Results   Component Value Date    POTASSIUM 4.7 09/11/2021    POTASSIUM 4.4 06/06/2017     Lab Results   Component Value Date    CHLORIDE 116 09/10/2021    CHLORIDE 108 06/06/2017     Lab Results   Component Value Date    ARELI 7.7 09/10/2021    ARELI 9.6 06/06/2017     Lab Results   Component Value Date    CO2 22 09/10/2021    CO2 23 06/06/2017     Lab Results   Component Value Date    BUN 19 09/10/2021    BUN 17 06/06/2017     Lab Results   Component Value Date    CR 1.29 09/10/2021    CR 1.03 06/06/2017     Lab Results   Component Value Date     09/11/2021     09/10/2021    GLC 89 06/06/2017       CXR:  9/11/21    IMPRESSION: Status post median sternotomy with right-sided central venous catheter, mediastinal drain, and left-sided chest tube remaining in place. Interval extubation. Improving lung volumes with persistent left basilar atelectasis, and small pleural   effusions. No CHF.    Corinna Slade PA-C  CV Surgery  Pager #[phone]

## 2021-09-11 NOTE — ANESTHESIA POSTPROCEDURE EVALUATION
Patient: Dragan Prasad    Procedure(s):  CORONARY ARTERY BYPASS GRAFTING X 3 WITH ENDOSCOPIC VEIN HARVEST LIMA-LAD SV-DISTAL RCA/ OM ON PUMP/MANINDER    Diagnosis:Coronary artery disease involving native coronary artery of native heart without angina pectoris [I25.10]  Diagnosis Additional Information: No value filed.    Anesthesia Type:  General    Note:  Disposition: Admission   Postop Pain Control: Uneventful            Sign Out: Well controlled pain   PONV: No   Neuro/Psych: Uneventful            Sign Out: Acceptable/Baseline neuro status   Airway/Respiratory: Uneventful            Sign Out: AIRWAY IN SITU/Resp. Support   CV/Hemodynamics: Uneventful            Sign Out: Acceptable CV status   Other NRE: NONE   DID A NON-ROUTINE EVENT OCCUR? No           Last vitals:  Vitals Value Taken Time   BP 94/63 09/10/21 1930   Temp 36.8  C (98.2  F) 09/10/21 1815   Pulse 81 09/10/21 1931   Resp 17 09/10/21 1931   SpO2 95 % 09/10/21 1931   Vitals shown include unvalidated device data.    Electronically Signed By: Neymar Weaver MD  September 10, 2021  7:33 PM

## 2021-09-11 NOTE — PLAN OF CARE
"vitals stable, required 500cc 5 %Alb. CT drainage small amt, thin dark red. Uop low but adequate. Remains up in recliner chair \"I can't stand the beds. Has been shifting weight & did stand to weigh.  "

## 2021-09-11 NOTE — PROGRESS NOTES
Received from OR, intubated, sedated.  Modest dose of Epi and Phenylephrine infusing, quickly weaned off to maintain SBP <120 per Dr. Pelayo (wishes staff to use cuff BP for titration of drips., Minimal chest tube output, adequate urine.  Not awake yet, daughter Minna called by this RN, voice message left with update .

## 2021-09-11 NOTE — PROVIDER NOTIFICATION
" Propofol weaned to off in attempts to wean pt. From vent,  pt. Abruptly   \" awoke\" started to reach for ETT, CT's, tonguing at ETT, very agitated, trying to sit up in bed, moves all extremeties very strong spontaneously however will not follow commands, does not open eyes. Bucking vent, thrashing head back & forth. Subglottic bloody Propofol restarted, Dr. Crane aware ? Use Precedex .  "

## 2021-09-11 NOTE — PROGRESS NOTES
"   09/11/21 1113   Quick Adds   Type of Visit Initial Occupational Therapy Evaluation   Living Environment   People in home alone   Current Living Arrangements other (see comments)   Home Accessibility stairs to enter home   Number of Stairs, Main Entrance 4   Stair Railings, Main Entrance railings on both sides of stairs;railings safe and in good condition   Transportation Anticipated family or friend will provide  (drives indep as well)   Living Environment Comments Pt lives in his RV on [address] - lives in [place] from Oct thru April. Has tub/shower however daughter states she can use his walk-in shower in house if needed.    Self-Care   Usual Activity Tolerance good   Current Activity Tolerance moderate   Regular Exercise Yes   Activity/Exercise Type strength training;other (see comments)   Exercise Amount/Frequency daily;30 mins;45 mins   Equipment Currently Used at Home none   Activity/Exercise/Self-Care Comment Indep ADL, IADL, mob no AD but ambulates very short distances only due to \"bad L knee\". Does standing push-ups on counter, weight lifting and uses a pedal bike daily.    Instrumental Activities of Daily Living (IADL)   Previous Responsibilities meal prep;housekeeping;laundry;driving  (take no meds)   Disability/Function   Hearing Difficulty or Deaf no   Wear Glasses or Blind yes   Vision Management readers only   Concentrating, Remembering or Making Decisions Difficulty no   Difficulty Communicating no   Difficulty Eating/Swallowing no   Walking or Climbing Stairs Difficulty yes   Walking or Climbing Stairs other (see comments)   Mobility Management avoids stairs when can, uses both rails to enter home (impaired L knee)   Dressing/Bathing Difficulty no   Toileting issues no   Doing Errands Independently Difficulty (such as shopping) no   Fall history within last six months no   General Information   Onset of Illness/Injury or Date of Surgery 09/10/21   Referring Physician Dr. 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 Underwent CABG x3 on 9/10/21.     Existing Precautions/Restrictions fall;sternal;oxygen therapy device and L/min  (2L)   Cognitive Status Examination   Orientation Status orientation to person, place and time   Affect/Mental Status (Cognitive) WNL   Follows Commands WNL   Visual Perception   Visual Impairment/Limitations corrective lenses for reading;WNL   Sensory   Sensory Quick Adds No deficits were identified   Pain Assessment   Patient Currently in Pain No   Transfers   Transfer Comments unable to get patient out of bed as on bedrest after Swanz-cath pulled. Per nursing, pt required Mod A of 1 for bed mob, CGA for sit-stand and bed to chair   Clinical Impression   Criteria for Skilled Therapeutic Interventions Met (OT) yes   OT Diagnosis decreased ADL/IADL performance   OT Problem List-Impairments impacting ADL problems related to;activity tolerance impaired;strength;post-surgical precautions   Assessment of Occupational Performance 3-5 Performance Deficits   Identified Performance Deficits functional mob, bathing, household mgmt, exercise skills, community mobility   Planned Therapy Interventions (OT) ADL retraining;IADL retraining;bed mobility training;transfer training;home program guidelines;progressive activity/exercise;risk factor education   Clinical Decision Making Complexity (OT) low complexity   Therapy Frequency (OT) 2x/day   Predicted Duration of Therapy 5 days   Risk & Benefits of therapy have been explained evaluation/treatment results reviewed;care plan/treatment goals reviewed;risks/benefits reviewed;current/potential barriers reviewed;participants voiced agreement with care plan;participants included;patient;daughter;son   OT Discharge Planning    OT  Discharge Recommendation (DC Rec) Home with assist;home with outpatient cardiac rehab   OT Rationale for DC Rec Recommend family assist with heavier household tasks and OPCR for progressive, monitored exercise program.    Total Evaluation Time (Minutes)   Total Evaluation Time (Minutes) 15

## 2021-09-11 NOTE — PROGRESS NOTES
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 Underwent CABG x3 on 9/10/21.  Has [facility] as primary service now. They will manage post-op cares. No other medical need for hospitalist service at this time. Discussed with [facility] PA.    - hospitalist service will sign off. Please re-consult if develops issues our service can assist with.    Miguel Power MD

## 2021-09-11 NOTE — PROGRESS NOTES
Up in chair for several hours, finds the beds uncomfortable, BP within parameters mostly less than 120 systolic.  Urine output modest will continue to observe.  Good pain relief with current regimen.  Daughter Bina and her spouse here this morning, aware of the plan of care.  Ready for transfer.  3 bags of personal belongings, including dentures, cell phone and  accompanied to patient to room

## 2021-09-11 NOTE — PROGRESS NOTES
Unable to find a good balance between agitation and sedation with propofol so will switch to dexmedetomidine.     -------------------------------------------------------  12:59 AM  Updated Note  ABG good on PS and calm on dexmedetomidine  P: extubate

## 2021-09-12 ENCOUNTER — APPOINTMENT (OUTPATIENT)
Dept: OCCUPATIONAL THERAPY | Facility: CLINIC | Age: 86
DRG: 235 | End: 2021-09-12
Payer: MEDICARE

## 2021-09-12 LAB
ANION GAP SERPL CALCULATED.3IONS-SCNC: 5 MMOL/L (ref 3–14)
BUN SERPL-MCNC: 28 MG/DL (ref 7–30)
CALCIUM SERPL-MCNC: 8.2 MG/DL (ref 8.5–10.1)
CHLORIDE BLD-SCNC: 109 MMOL/L (ref 94–109)
CO2 SERPL-SCNC: 23 MMOL/L (ref 20–32)
CREAT SERPL-MCNC: 1.4 MG/DL (ref 0.66–1.25)
ERYTHROCYTE [DISTWIDTH] IN BLOOD BY AUTOMATED COUNT: 13.2 % (ref 10–15)
GFR SERPL CREATININE-BSD FRML MDRD: 46 ML/MIN/1.73M2
GLUCOSE BLD-MCNC: 122 MG/DL (ref 70–99)
GLUCOSE BLDC GLUCOMTR-MCNC: 120 MG/DL (ref 70–99)
GLUCOSE BLDC GLUCOMTR-MCNC: 125 MG/DL (ref 70–99)
GLUCOSE BLDC GLUCOMTR-MCNC: 137 MG/DL (ref 70–99)
GLUCOSE BLDC GLUCOMTR-MCNC: 140 MG/DL (ref 70–99)
HCT VFR BLD AUTO: 37.3 % (ref 40–53)
HGB BLD-MCNC: 11.8 G/DL (ref 13.3–17.7)
MAGNESIUM SERPL-MCNC: 2.5 MG/DL (ref 1.6–2.3)
MCH RBC QN AUTO: 30.9 PG (ref 26.5–33)
MCHC RBC AUTO-ENTMCNC: 31.6 G/DL (ref 31.5–36.5)
MCV RBC AUTO: 98 FL (ref 78–100)
PHOSPHATE SERPL-MCNC: 2.5 MG/DL (ref 2.5–4.5)
PLATELET # BLD AUTO: 165 10E3/UL (ref 150–450)
POTASSIUM BLD-SCNC: 4.4 MMOL/L (ref 3.4–5.3)
RBC # BLD AUTO: 3.82 10E6/UL (ref 4.4–5.9)
SODIUM SERPL-SCNC: 137 MMOL/L (ref 133–144)
WBC # BLD AUTO: 14.9 10E3/UL (ref 4–11)

## 2021-09-12 PROCEDURE — 250N000013 HC RX MED GY IP 250 OP 250 PS 637: Performed by: PHYSICIAN ASSISTANT

## 2021-09-12 PROCEDURE — 80048 BASIC METABOLIC PNL TOTAL CA: CPT | Performed by: PHYSICIAN ASSISTANT

## 2021-09-12 PROCEDURE — 250N000011 HC RX IP 250 OP 636: Performed by: THORACIC SURGERY (CARDIOTHORACIC VASCULAR SURGERY)

## 2021-09-12 PROCEDURE — 93005 ELECTROCARDIOGRAM TRACING: CPT

## 2021-09-12 PROCEDURE — 258N000003 HC RX IP 258 OP 636: Performed by: THORACIC SURGERY (CARDIOTHORACIC VASCULAR SURGERY)

## 2021-09-12 PROCEDURE — 97110 THERAPEUTIC EXERCISES: CPT | Mod: GO

## 2021-09-12 PROCEDURE — 85027 COMPLETE CBC AUTOMATED: CPT | Performed by: PHYSICIAN ASSISTANT

## 2021-09-12 PROCEDURE — 120N000001 HC R&B MED SURG/OB

## 2021-09-12 PROCEDURE — 84100 ASSAY OF PHOSPHORUS: CPT | Performed by: PHYSICIAN ASSISTANT

## 2021-09-12 PROCEDURE — 250N000011 HC RX IP 250 OP 636: Performed by: PHYSICIAN ASSISTANT

## 2021-09-12 PROCEDURE — 83735 ASSAY OF MAGNESIUM: CPT | Performed by: PHYSICIAN ASSISTANT

## 2021-09-12 PROCEDURE — 97535 SELF CARE MNGMENT TRAINING: CPT | Mod: GO

## 2021-09-12 PROCEDURE — 36415 COLL VENOUS BLD VENIPUNCTURE: CPT | Performed by: PHYSICIAN ASSISTANT

## 2021-09-12 RX ORDER — GUAIFENESIN 600 MG/1
600 TABLET, EXTENDED RELEASE ORAL 2 TIMES DAILY
Status: DISCONTINUED | OUTPATIENT
Start: 2021-09-12 | End: 2021-09-17 | Stop reason: HOSPADM

## 2021-09-12 RX ORDER — METOPROLOL TARTRATE 25 MG/1
25 TABLET, FILM COATED ORAL 2 TIMES DAILY
Status: DISCONTINUED | OUTPATIENT
Start: 2021-09-12 | End: 2021-09-16

## 2021-09-12 RX ADMIN — HEPARIN SODIUM 5000 UNITS: 5000 INJECTION INTRAVENOUS; SUBCUTANEOUS at 04:47

## 2021-09-12 RX ADMIN — ACETAMINOPHEN 975 MG: 325 TABLET, FILM COATED ORAL at 19:47

## 2021-09-12 RX ADMIN — AMIODARONE HYDROCHLORIDE 150 MG: 1.5 INJECTION, SOLUTION INTRAVENOUS at 21:51

## 2021-09-12 RX ADMIN — HYDRALAZINE HYDROCHLORIDE 10 MG: 20 INJECTION INTRAMUSCULAR; INTRAVENOUS at 05:06

## 2021-09-12 RX ADMIN — ATORVASTATIN CALCIUM 40 MG: 40 TABLET, FILM COATED ORAL at 19:47

## 2021-09-12 RX ADMIN — OXYCODONE HYDROCHLORIDE 10 MG: 5 TABLET ORAL at 16:43

## 2021-09-12 RX ADMIN — OXYCODONE HYDROCHLORIDE 5 MG: 5 TABLET ORAL at 22:09

## 2021-09-12 RX ADMIN — GUAIFENESIN 600 MG: 600 TABLET ORAL at 19:52

## 2021-09-12 RX ADMIN — SENNOSIDES AND DOCUSATE SODIUM 1 TABLET: 8.6; 5 TABLET ORAL at 09:17

## 2021-09-12 RX ADMIN — METHOCARBAMOL 500 MG: 500 TABLET ORAL at 09:18

## 2021-09-12 RX ADMIN — ACETAMINOPHEN 975 MG: 325 TABLET, FILM COATED ORAL at 04:38

## 2021-09-12 RX ADMIN — OXYCODONE HYDROCHLORIDE 10 MG: 5 TABLET ORAL at 00:36

## 2021-09-12 RX ADMIN — POLYETHYLENE GLYCOL 3350 17 G: 17 POWDER, FOR SOLUTION ORAL at 09:17

## 2021-09-12 RX ADMIN — METHOCARBAMOL 500 MG: 500 TABLET ORAL at 19:46

## 2021-09-12 RX ADMIN — HEPARIN SODIUM 5000 UNITS: 5000 INJECTION INTRAVENOUS; SUBCUTANEOUS at 19:48

## 2021-09-12 RX ADMIN — METOPROLOL TARTRATE 25 MG: 25 TABLET, FILM COATED ORAL at 09:17

## 2021-09-12 RX ADMIN — METHOCARBAMOL 500 MG: 500 TABLET ORAL at 00:35

## 2021-09-12 RX ADMIN — AMIODARONE HYDROCHLORIDE 1 MG/MIN: 50 INJECTION, SOLUTION INTRAVENOUS at 22:04

## 2021-09-12 RX ADMIN — METOPROLOL TARTRATE 25 MG: 25 TABLET, FILM COATED ORAL at 19:47

## 2021-09-12 RX ADMIN — OXYCODONE HYDROCHLORIDE 10 MG: 5 TABLET ORAL at 04:37

## 2021-09-12 RX ADMIN — GUAIFENESIN 600 MG: 600 TABLET ORAL at 09:17

## 2021-09-12 RX ADMIN — GABAPENTIN 100 MG: 100 CAPSULE ORAL at 22:09

## 2021-09-12 RX ADMIN — PANTOPRAZOLE SODIUM 40 MG: 40 TABLET, DELAYED RELEASE ORAL at 09:17

## 2021-09-12 RX ADMIN — ASPIRIN 325 MG: 325 TABLET, COATED ORAL at 09:17

## 2021-09-12 ASSESSMENT — ACTIVITIES OF DAILY LIVING (ADL)
ADLS_ACUITY_SCORE: 18

## 2021-09-12 ASSESSMENT — MIFFLIN-ST. JEOR: SCORE: 1729.13

## 2021-09-12 NOTE — PROGRESS NOTES
Patient seen and care plan discussed with Dr. Pelayo    [facility]  Cardiovascular and Thoracic Surgery Daily Note          Assessment and Plan:   POD#2 s/p Coronary artery bypass grafting x 3, Left internal mammary artery to left anterior descending artery, Reversed saphenous vein graft to distal RCA, Reversed saphenous vein graft to obtuse marginal artery, Endoscopic vein harvest left lower extremity, Transesophageal echocardiogram by Dr. Zachary Pelayo  -[facility]: HR: 70s-90s. SBP: 110s-130s. Pre op EF: 45-50%. Weaned off pressors. ASA, BB increased to 25mg BID today. Pacer wires capped. Chest tubes with too much output to remove today-- will continue to suction.   -Resp: Extubated within protocol. Saturating well on 3L. Continue to wean as able. Continue to encourage IS, cough, deep breathing, ambulation. Mucinex added for productive cough.  -Neuro:  Grossly intact. Pain controlled  -Renal: good UOP. Up about 1kg from preoperative weight. Cr: 1.40<1.29. Mild GHASSAN on CKD. Baseline creatinine appears to be about 1.2-1.3. BUN 28. Will continue to hold diuretics today and continue to monitor  -GI: Tolerating diet. No BM. Continue bowel regimen  -:  Remove chandler today.  -Endo: pre op a1c: 5.8. Minimal insulin infusion requirements. Will transition to sliding scale insulin per protocol.   -FEN: replace electrolytes as needed. Na: 137. K: 4.4  Orders Placed This Encounter      Advance Diet as Tolerated: Regular Diet Adult; Regular Diet Adult; Low Saturated Fat Na <2400mg Diet    -ID: Temp (24hrs), Av.5  F (36.9  C), Min:98  F (36.7  C), Max:99  F (37.2  C)  WBC: 14.9<8.8. Completed perioperative abx. Leukocytosis likely related to stress from surgery, will continue to monitor.   -Heme: Hgb: 11.8. plt: 165. Acute blood loss anemia and thrombocytopenia related to surgery  -Proph: PCD, ASA, BB, statin, PPI, sub q heparin  -Dispo: [facility]. Continue therapies. Remove chandler today. Continue chest tubes  "today. Wean oxygen as able. Continue to encourage IS, cough, deep breathing, ambulation.           Interval History:   No acute events overnight. Saturating well on 3L. Pain controlled. Tolerating diet. No BM.          Medications:       acetaminophen  975 mg Oral Q8H     aspirin  325 mg Oral Daily     atorvastatin  40 mg Oral QPM     gabapentin  100 mg Oral At Bedtime     guaiFENesin  600 mg Oral BID     heparin ANTICOAGULANT  5,000 Units Subcutaneous Q8H     insulin aspart  1-7 Units Subcutaneous TID AC     insulin aspart  1-5 Units Subcutaneous At Bedtime     metoprolol tartrate  25 mg Oral BID     pantoprazole  40 mg Oral or NG Tube Daily    Or     pantoprazole  40 mg Oral Daily     polyethylene glycol  17 g Oral Daily     senna-docusate  1 tablet Oral BID     sodium chloride (PF)  3 mL Intracatheter Q8H     [START ON 9/13/2021] acetaminophen, bisacodyl, glucose **OR** dextrose **OR** glucagon, hydrALAZINE, HYDROmorphone **OR** HYDROmorphone, lidocaine 4%, lidocaine (buffered or not buffered), magnesium hydroxide, methocarbamol, naloxone **OR** naloxone **OR** naloxone **OR** naloxone, ondansetron **OR** ondansetron, oxyCODONE **OR** oxyCODONE, BETA BLOCKER NOT PRESCRIBED, sodium chloride (PF)          Physical Exam:   Vitals were reviewed  Blood pressure 131/84, pulse 82, temperature 98.6  F (37  C), temperature source Oral, resp. rate 16, height 1.803 m (5' 11\"), weight 101.7 kg (224 lb 3.3 oz), SpO2 96 %.  Rhythm: NSR    Lungs: diminished bases    Cardiovascular: RRR normal s1 and s2    Abdomen: soft NTND    Extremeties: minimal edema    Incision: CDI    CT: to suction    Weight:   Vitals:    09/08/21 0000 09/09/21 0400 09/10/21 0610 09/11/21 0630   Weight: 96.8 kg (213 lb 6.5 oz) 96.8 kg (213 lb 6.5 oz) 97.1 kg (214 lb) 102.1 kg (225 lb 1.4 oz)    09/12/21 0500   Weight: 101.7 kg (224 lb 3.3 oz)            Data:   Labs:   Lab Results   Component Value Date    WBC 14.9 09/12/2021    WBC 8.8 03/09/2011     Lab " Results   Component Value Date    RBC 3.82 09/12/2021    RBC 4.72 03/09/2011     Lab Results   Component Value Date    HGB 11.8 09/12/2021    HGB 14.5 03/09/2011     Lab Results   Component Value Date    HCT 37.3 09/12/2021    HCT 43.5 03/09/2011     No components found for: MCT  Lab Results   Component Value Date    MCV 98 09/12/2021    MCV 92 03/09/2011     Lab Results   Component Value Date    MCH 30.9 09/12/2021    MCH 30.7 03/09/2011     Lab Results   Component Value Date    MCHC 31.6 09/12/2021    MCHC 33.3 03/09/2011     Lab Results   Component Value Date    RDW 13.2 09/12/2021    RDW 13.2 03/09/2011     Lab Results   Component Value Date     09/12/2021     03/09/2011       Last Basic Metabolic Panel:  Lab Results   Component Value Date     09/12/2021     06/06/2017      Lab Results   Component Value Date    POTASSIUM 4.4 09/12/2021    POTASSIUM 4.4 06/06/2017     Lab Results   Component Value Date    CHLORIDE 109 09/12/2021    CHLORIDE 108 06/06/2017     Lab Results   Component Value Date    ARELI 8.2 09/12/2021    ARELI 9.6 06/06/2017     Lab Results   Component Value Date    CO2 23 09/12/2021    CO2 23 06/06/2017     Lab Results   Component Value Date    BUN 28 09/12/2021    BUN 17 06/06/2017     Lab Results   Component Value Date    CR 1.40 09/12/2021    CR 1.03 06/06/2017     Lab Results   Component Value Date     09/12/2021     09/12/2021    GLC 89 06/06/2017       CXR: 9/11/21    IMPRESSION: Status post median sternotomy with right-sided central venous catheter, mediastinal drain, and left-sided chest tube remaining in place. Interval extubation. Improving lung volumes with persistent left basilar atelectasis, and small pleural   effusions. No CHF.    Corinna Slade PA-C  [facility]  Pager # [phone]

## 2021-09-12 NOTE — PLAN OF CARE
VSS except BP increasing overnight, hydralazine given x1 for BP>140. Sating 90s on 3L NC. Pain managed with oxycodone, tylenol, & robaxin. LS diminished, congested cough - using acapella & IS. Maddox patent, borderline output, encouraging fluids. Low fat diet. BS checks. Assist of 1 with GB./walker. Sternal incision CDI. Pacer wires capped. Chest tube to suction. Sternal precautions maintained. Slept in chair overnight, not sleeping much due to coughing. Tele SR with 1st degree AV block.

## 2021-09-12 NOTE — CONSULTS
"NUTRITION ASSESSMENT      REASON FOR ASSESSMENT:  Cardiac Surgery Nutrition Consult    CURRENT DIET / INTAKE:  Regular     Patient tolerating diet - had eggs, potatoes, and OJ this morning.     ANTHROPOMETRICS:   Ht: 5'11\"  Wt: 100.4 kg (221#)(9/7)  BMI: 30.8 kg/m^2  IBW: 78.2 kg   Weight Status: Obesity Grade I BMI 30-34.9  %IBW: 128%    MALNUTRITION:  Patient does not meet two of the following criteria necessary for diagnosing malnutrition:  significant weight loss, reduced intake, subcutaneous fat loss, muscle loss or fluid retention. Nutrition Focused Physical Assessment (NFPA) not appropriate at this time.    NUTRITION DIAGNOSIS:   Increased nutrient needs (protein) R/t recent cardiac surgery AEB need for oral nutritional supplement     INTERVENTIONS:    Nutrition Prescription:  LSF, < 2400 mg Na   Ensure BID between meals     Implementation:  Ordered Ensure as above    Goals:  Patient to consume ~75% at meals in the next 3 - 5 days    Follow Up/Monitoring (InPatient):  Food and Fluid intake -  Monitor for adequacy    Follow Up/Monitoring (OutPatient):  Patient will participate in out-patient cardiac rehab and attend nutrition classes during the program    Awilda Agudelo RD, LD, CNSC   Clinical Dietitian - [facility]     "

## 2021-09-12 NOTE — PROGRESS NOTES
"SPIRITUAL HEALTH SERVICES Progress Note  [facility]    Visited pt due to length of stay. I introduced myself and SH services and pt reported that he is \"not up for a visit today\" but welcomes future SH visits \"because I'll be here for a while\". I let him know I will try to check back in with him in a few days.    [facility] remains available.      Lashawn Mock  Chaplain Resident    "

## 2021-09-13 ENCOUNTER — APPOINTMENT (OUTPATIENT)
Dept: OCCUPATIONAL THERAPY | Facility: CLINIC | Age: 86
DRG: 235 | End: 2021-09-13
Payer: MEDICARE

## 2021-09-13 LAB
ANION GAP SERPL CALCULATED.3IONS-SCNC: 7 MMOL/L (ref 3–14)
ATRIAL RATE - MUSE: 56 BPM
ATRIAL RATE - MUSE: 69 BPM
BUN SERPL-MCNC: 37 MG/DL (ref 7–30)
CALCIUM SERPL-MCNC: 8.2 MG/DL (ref 8.5–10.1)
CHLORIDE BLD-SCNC: 107 MMOL/L (ref 94–109)
CO2 SERPL-SCNC: 24 MMOL/L (ref 20–32)
CREAT SERPL-MCNC: 1.51 MG/DL (ref 0.66–1.25)
DIASTOLIC BLOOD PRESSURE - MUSE: NORMAL MMHG
DIASTOLIC BLOOD PRESSURE - MUSE: NORMAL MMHG
ERYTHROCYTE [DISTWIDTH] IN BLOOD BY AUTOMATED COUNT: 13.2 % (ref 10–15)
GFR SERPL CREATININE-BSD FRML MDRD: 42 ML/MIN/1.73M2
GLUCOSE BLD-MCNC: 130 MG/DL (ref 70–99)
GLUCOSE BLDC GLUCOMTR-MCNC: 110 MG/DL (ref 70–99)
GLUCOSE BLDC GLUCOMTR-MCNC: 111 MG/DL (ref 70–99)
GLUCOSE BLDC GLUCOMTR-MCNC: 116 MG/DL (ref 70–99)
GLUCOSE BLDC GLUCOMTR-MCNC: 120 MG/DL (ref 70–99)
HCT VFR BLD AUTO: 36 % (ref 40–53)
HGB BLD-MCNC: 11.5 G/DL (ref 13.3–17.7)
INTERPRETATION ECG - MUSE: NORMAL
INTERPRETATION ECG - MUSE: NORMAL
MAGNESIUM SERPL-MCNC: 2.7 MG/DL (ref 1.6–2.3)
MCH RBC QN AUTO: 31.2 PG (ref 26.5–33)
MCHC RBC AUTO-ENTMCNC: 31.9 G/DL (ref 31.5–36.5)
MCV RBC AUTO: 98 FL (ref 78–100)
P AXIS - MUSE: 39 DEGREES
P AXIS - MUSE: 48 DEGREES
PHOSPHATE SERPL-MCNC: 2.2 MG/DL (ref 2.5–4.5)
PLATELET # BLD AUTO: 188 10E3/UL (ref 150–450)
POTASSIUM BLD-SCNC: 4.3 MMOL/L (ref 3.4–5.3)
PR INTERVAL - MUSE: 216 MS
PR INTERVAL - MUSE: 230 MS
QRS DURATION - MUSE: 92 MS
QRS DURATION - MUSE: 92 MS
QT - MUSE: 396 MS
QT - MUSE: 422 MS
QTC - MUSE: 407 MS
QTC - MUSE: 424 MS
R AXIS - MUSE: 1 DEGREES
R AXIS - MUSE: 10 DEGREES
RBC # BLD AUTO: 3.69 10E6/UL (ref 4.4–5.9)
SODIUM SERPL-SCNC: 138 MMOL/L (ref 133–144)
SYSTOLIC BLOOD PRESSURE - MUSE: NORMAL MMHG
SYSTOLIC BLOOD PRESSURE - MUSE: NORMAL MMHG
T AXIS - MUSE: 72 DEGREES
T AXIS - MUSE: 78 DEGREES
VENTRICULAR RATE- MUSE: 56 BPM
VENTRICULAR RATE- MUSE: 69 BPM
WBC # BLD AUTO: 15.1 10E3/UL (ref 4–11)

## 2021-09-13 PROCEDURE — 999N000157 HC STATISTIC RCP TIME EA 10 MIN

## 2021-09-13 PROCEDURE — 250N000009 HC RX 250: Performed by: PHYSICIAN ASSISTANT

## 2021-09-13 PROCEDURE — 97110 THERAPEUTIC EXERCISES: CPT | Mod: GO | Performed by: OCCUPATIONAL THERAPIST

## 2021-09-13 PROCEDURE — 36415 COLL VENOUS BLD VENIPUNCTURE: CPT | Performed by: PHYSICIAN ASSISTANT

## 2021-09-13 PROCEDURE — 250N000011 HC RX IP 250 OP 636: Performed by: PHYSICIAN ASSISTANT

## 2021-09-13 PROCEDURE — 250N000013 HC RX MED GY IP 250 OP 250 PS 637: Performed by: PHYSICIAN ASSISTANT

## 2021-09-13 PROCEDURE — 120N000001 HC R&B MED SURG/OB

## 2021-09-13 PROCEDURE — 80048 BASIC METABOLIC PNL TOTAL CA: CPT | Performed by: PHYSICIAN ASSISTANT

## 2021-09-13 PROCEDURE — 94640 AIRWAY INHALATION TREATMENT: CPT

## 2021-09-13 PROCEDURE — 97535 SELF CARE MNGMENT TRAINING: CPT | Mod: GO | Performed by: OCCUPATIONAL THERAPIST

## 2021-09-13 PROCEDURE — 83735 ASSAY OF MAGNESIUM: CPT | Performed by: SURGERY

## 2021-09-13 PROCEDURE — 84100 ASSAY OF PHOSPHORUS: CPT | Performed by: SURGERY

## 2021-09-13 PROCEDURE — 85027 COMPLETE CBC AUTOMATED: CPT | Performed by: PHYSICIAN ASSISTANT

## 2021-09-13 RX ORDER — LEVALBUTEROL 1.25 MG/.5ML
1.25 SOLUTION, CONCENTRATE RESPIRATORY (INHALATION) EVERY 6 HOURS PRN
Status: DISCONTINUED | OUTPATIENT
Start: 2021-09-13 | End: 2021-09-13

## 2021-09-13 RX ORDER — LEVALBUTEROL 1.25 MG/.5ML
1.25 SOLUTION, CONCENTRATE RESPIRATORY (INHALATION)
Status: DISCONTINUED | OUTPATIENT
Start: 2021-09-13 | End: 2021-09-17 | Stop reason: HOSPADM

## 2021-09-13 RX ORDER — AMIODARONE HYDROCHLORIDE 200 MG/1
400 TABLET ORAL 2 TIMES DAILY
Status: DISCONTINUED | OUTPATIENT
Start: 2021-09-13 | End: 2021-09-17 | Stop reason: HOSPADM

## 2021-09-13 RX ADMIN — GUAIFENESIN 600 MG: 600 TABLET ORAL at 20:43

## 2021-09-13 RX ADMIN — METOPROLOL TARTRATE 25 MG: 25 TABLET, FILM COATED ORAL at 20:43

## 2021-09-13 RX ADMIN — SENNOSIDES AND DOCUSATE SODIUM 1 TABLET: 8.6; 5 TABLET ORAL at 20:42

## 2021-09-13 RX ADMIN — ATORVASTATIN CALCIUM 40 MG: 40 TABLET, FILM COATED ORAL at 20:42

## 2021-09-13 RX ADMIN — HEPARIN SODIUM 5000 UNITS: 5000 INJECTION INTRAVENOUS; SUBCUTANEOUS at 12:20

## 2021-09-13 RX ADMIN — METOPROLOL TARTRATE 25 MG: 25 TABLET, FILM COATED ORAL at 08:21

## 2021-09-13 RX ADMIN — HEPARIN SODIUM 5000 UNITS: 5000 INJECTION INTRAVENOUS; SUBCUTANEOUS at 20:42

## 2021-09-13 RX ADMIN — SENNOSIDES AND DOCUSATE SODIUM 1 TABLET: 8.6; 5 TABLET ORAL at 08:21

## 2021-09-13 RX ADMIN — POLYETHYLENE GLYCOL 3350 17 G: 17 POWDER, FOR SOLUTION ORAL at 08:21

## 2021-09-13 RX ADMIN — MAGNESIUM HYDROXIDE 30 ML: 400 SUSPENSION ORAL at 16:48

## 2021-09-13 RX ADMIN — ASPIRIN 325 MG: 325 TABLET, COATED ORAL at 08:21

## 2021-09-13 RX ADMIN — PANTOPRAZOLE SODIUM 40 MG: 40 TABLET, DELAYED RELEASE ORAL at 08:21

## 2021-09-13 RX ADMIN — GUAIFENESIN 600 MG: 600 TABLET ORAL at 08:21

## 2021-09-13 RX ADMIN — LEVALBUTEROL HYDROCHLORIDE 1.25 MG: 1.25 SOLUTION, CONCENTRATE RESPIRATORY (INHALATION) at 16:27

## 2021-09-13 RX ADMIN — ACETAMINOPHEN 975 MG: 325 TABLET, FILM COATED ORAL at 12:20

## 2021-09-13 RX ADMIN — ACETAMINOPHEN 650 MG: 325 TABLET, FILM COATED ORAL at 20:42

## 2021-09-13 RX ADMIN — HEPARIN SODIUM 5000 UNITS: 5000 INJECTION INTRAVENOUS; SUBCUTANEOUS at 04:16

## 2021-09-13 RX ADMIN — GABAPENTIN 100 MG: 100 CAPSULE ORAL at 22:46

## 2021-09-13 RX ADMIN — AMIODARONE HYDROCHLORIDE 400 MG: 200 TABLET ORAL at 20:43

## 2021-09-13 ASSESSMENT — MIFFLIN-ST. JEOR: SCORE: 1742.13

## 2021-09-13 ASSESSMENT — ACTIVITIES OF DAILY LIVING (ADL)
ADLS_ACUITY_SCORE: 18

## 2021-09-13 NOTE — CODE STATUS AND ADVANCE DIRECTIVES
"Brief house NP note:    Called re: unwitnessed fall. On exam patient was in bathroom. States he slid to the ground and \"knows how to fall.\" Denies injury or LOC. No imaging indicated.     Please page with additional concerns,     LAURA Benavides, CNP  Hospitalist - [facility]  Text Page  [phone])    No charge  "

## 2021-09-13 NOTE — PROVIDER NOTIFICATION
MD Notification    Notified Person: MD    Notified Person Name: Dr. Ansari with [facility]    Notification Date/Time: 9/12 @ 2045    Notification Interaction: Page with call back    Purpose of Notification: Patient went into afib RVR after slipping out of bed. EKG confirmed AFib with PVCs. -115. /78. Denies chest pain. Given 25mg metprolol PO dose early. Please advise.    Orders Received: New orders for Amiodarone gtt with bolus.    Comments:

## 2021-09-13 NOTE — PROGRESS NOTES
Patient seen and care plan discussed with Dr. Miller and Dr. Coty Machuca [facility]  Cardiovascular and Thoracic Surgery Daily Note          Assessment and Plan:   POD#3 s/p Coronary artery bypass grafting x 3, Left internal mammary artery to left anterior descending artery, Reversed saphenous vein graft to distal RCA, Reversed saphenous vein graft to obtuse marginal artery, Endoscopic vein harvest left lower extremity, Transesophageal echocardiogram by Dr. Zachary Pelayo  -[facility]: HR: 60s-90s, SBP: 100s-150s. Pre op EF: 45-50%. Went into a fib with RVR overnight and treated with amiodarone and converted to NSR. Plan to transition to oral amiodarone 400mg BID. ASA, BB, statin. Pacer wires capped. Chest tube with too much output to remove today-- will transition to waterseal.   -Resp: Extubated within protocol. Saturating well on 2L. Continue to wean as able. Continue to encourage IS, cough, deep breathing, ambulation. Added mucinex and nebulizers for mucus and cough  -Neuro:  Grossly intact. Pain controlled.   -Renal: good UOP. Up about 1kg from preoperative weight. CR: 1.51<1.40<1.29. Mild GHASSAN on CKD. Baseline creatinine appears to be about 1.2-1.3. BUN 37. Will continue to hold diuretics today and continue to monitor.   -GI:  Tolerating diet. + BM. Continue bowel regimen  -:  Voiding well on own  -Endo: pre op A1c: 5.8. Minimal insulin infusion requirements. Continue sliding scale insulin.   -FEN: replace electrolytes as needed. Na: 138. K: 4.3  Orders Placed This Encounter      Advance Diet as Tolerated: Regular Diet Adult; Regular Diet Adult; Low Saturated Fat Na <2400mg Diet    -ID: Temp (24hrs), Av.8  F (36.6  C), Min:97.7  F (36.5  C), Max:98.1  F (36.7  C)  WBC: 15.1<14.96<8.8. Completed perioperative abx. Leukocytosis likely related to stress from surgery, will continue to monitor.   -Heme: Hgb: 11.5. plt: 188. Acute blood loss anemia and thrombocytopenia related to surgery.   -Proph:  "PCD, ASA, BB, statin, PPI, sub q heparin  -Dispo: [facility]. Transition to oral amiodarone. Continue chest tubes today. Continue therapies. Continue to encourage IS, cough, deep breathing, ambulation.           Interval History:   Patient had unwitnessed fall overnight. Also went into a fib with RVR overnight and was treated with amiodarone- converted to NSR this am.          Medications:       acetaminophen  975 mg Oral Q8H     aspirin  325 mg Oral Daily     atorvastatin  40 mg Oral QPM     gabapentin  100 mg Oral At Bedtime     guaiFENesin  600 mg Oral BID     heparin ANTICOAGULANT  5,000 Units Subcutaneous Q8H     insulin aspart  1-7 Units Subcutaneous TID AC     insulin aspart  1-5 Units Subcutaneous At Bedtime     metoprolol tartrate  25 mg Oral BID     pantoprazole  40 mg Oral or NG Tube Daily    Or     pantoprazole  40 mg Oral Daily     polyethylene glycol  17 g Oral Daily     senna-docusate  1 tablet Oral BID     sodium chloride (PF)  3 mL Intracatheter Q8H     acetaminophen, bisacodyl, glucose **OR** dextrose **OR** glucagon, hydrALAZINE, HYDROmorphone **OR** HYDROmorphone, lidocaine 4%, lidocaine (buffered or not buffered), magnesium hydroxide, methocarbamol, naloxone **OR** naloxone **OR** naloxone **OR** naloxone, ondansetron **OR** ondansetron, oxyCODONE **OR** oxyCODONE, BETA BLOCKER NOT PRESCRIBED, sodium chloride (PF)          Physical Exam:   Vitals were reviewed  Blood pressure (!) 150/86, pulse 70, temperature 97.7  F (36.5  C), temperature source Oral, resp. rate 16, height 1.803 m (5' 11\"), weight 103 kg (227 lb 1.2 oz), SpO2 97 %.  Rhythm: NSR    Lungs: diminished bases    Cardiovascular: RRR normal s1 and s2    Abdomen: soft NTND    Extremeties: 1+ lower extremity edema    Incision: CDI    CT: to waterseal    Weight:   Vitals:    09/09/21 0400 09/10/21 0610 09/11/21 0630 09/12/21 0500   Weight: 96.8 kg (213 lb 6.5 oz) 97.1 kg (214 lb) 102.1 kg (225 lb 1.4 oz) 101.7 kg (224 lb 3.3 oz)    09/13/21 " 0341   Weight: 103 kg (227 lb 1.2 oz)            Data:   Labs:   Lab Results   Component Value Date    WBC 15.1 09/13/2021    WBC 8.8 03/09/2011     Lab Results   Component Value Date    RBC 3.69 09/13/2021    RBC 4.72 03/09/2011     Lab Results   Component Value Date    HGB 11.5 09/13/2021    HGB 14.5 03/09/2011     Lab Results   Component Value Date    HCT 36.0 09/13/2021    HCT 43.5 03/09/2011     No components found for: MCT  Lab Results   Component Value Date    MCV 98 09/13/2021    MCV 92 03/09/2011     Lab Results   Component Value Date    MCH 31.2 09/13/2021    MCH 30.7 03/09/2011     Lab Results   Component Value Date    MCHC 31.9 09/13/2021    MCHC 33.3 03/09/2011     Lab Results   Component Value Date    RDW 13.2 09/13/2021    RDW 13.2 03/09/2011     Lab Results   Component Value Date     09/13/2021     03/09/2011       Last Basic Metabolic Panel:  Lab Results   Component Value Date     09/13/2021     06/06/2017      Lab Results   Component Value Date    POTASSIUM 4.3 09/13/2021    POTASSIUM 4.4 06/06/2017     Lab Results   Component Value Date    CHLORIDE 107 09/13/2021    CHLORIDE 108 06/06/2017     Lab Results   Component Value Date    ARELI 8.2 09/13/2021    ARELI 9.6 06/06/2017     Lab Results   Component Value Date    CO2 24 09/13/2021    CO2 23 06/06/2017     Lab Results   Component Value Date    BUN 37 09/13/2021    BUN 17 06/06/2017     Lab Results   Component Value Date    CR 1.51 09/13/2021    CR 1.03 06/06/2017     Lab Results   Component Value Date     09/13/2021    GLC 89 06/06/2017       Corinna Slade PA-C  [facility]  Pager # [phone]

## 2021-09-13 NOTE — SIGNIFICANT EVENT
Patient had a fall.Patient was sitting at the edge of the bed when RN went into room to see patient sitting on the edge of the bed. While speaking with bed 2 loud noise was heard when RN saw patient on the floor on hands and knees. Other RNs were notified. Patient explains he slid to the ground and denies injury or LOC. Before RNs could assist patient had already picked up chest tubes and chandler and was walking to the bathroom. House NP notified and assessed patient.  Tele tech notified primary RN that patient went into Afib at the time of the fall. Unsure if fall was caused by sudden onset of afib or afib was caused by the fall.

## 2021-09-13 NOTE — PLAN OF CARE
A&Ox4, forgetful. VSS. Wore 2L overnight, dipped to 87% while sleeping. Tele Afib/aflutter, converted to SR around 6am. Pain managed with prn medications. CTs to suction. Pacer wires capped. Maddox patent, pulled at 6am, due to void. AMio gtt infusing. Regular 2Gm NA diet. Blood sugar check. Up with assist of 1. +BS, +Gas, due for bowel movement, patient wants to try suppository in AM.

## 2021-09-13 NOTE — PLAN OF CARE
Pt. Alert and oriented x4. Vital signs stable on RA. Assist of 1 with GB. Tolerating 2GM sodium diet. Lung sounds diminished. Bowel sounds active, + flatus. BM before surgery, adequate urine output. Sternal incision with some swelling and ecchymosis. LLE harvest site with diffuse bruising through inner thigh. CT to water seal with moderate serosanguineous output, no airleak. Pain managed with Tylenol. Denies nausea. Tele SR. Pt on amio gtt until 2215.

## 2021-09-14 ENCOUNTER — APPOINTMENT (OUTPATIENT)
Dept: OCCUPATIONAL THERAPY | Facility: CLINIC | Age: 86
DRG: 235 | End: 2021-09-14
Payer: MEDICARE

## 2021-09-14 LAB
ANION GAP SERPL CALCULATED.3IONS-SCNC: 7 MMOL/L (ref 3–14)
ATRIAL RATE - MUSE: 119 BPM
ATRIAL RATE - MUSE: 78 BPM
ATRIAL RATE - MUSE: 83 BPM
BASE EXCESS BLDA CALC-SCNC: -0.4 MMOL/L (ref -9.6–2)
BASE EXCESS BLDA CALC-SCNC: -1 MMOL/L (ref -9.6–2)
BASE EXCESS BLDA CALC-SCNC: -2 MMOL/L (ref -9.6–2)
BASE EXCESS BLDA CALC-SCNC: -2.1 MMOL/L (ref -9.6–2)
BASE EXCESS BLDA CALC-SCNC: -3 MMOL/L (ref -9.6–2)
BASE EXCESS BLDA CALC-SCNC: -3.2 MMOL/L (ref -9.6–2)
BASE EXCESS BLDV CALC-SCNC: 0.4 MMOL/L (ref -8.1–1.9)
BUN SERPL-MCNC: 32 MG/DL (ref 7–30)
CA-I BLD-MCNC: 4.1 MG/DL (ref 4.4–5.2)
CA-I BLD-MCNC: 4.1 MG/DL (ref 4.4–5.2)
CA-I BLD-MCNC: 4.2 MG/DL (ref 4.4–5.2)
CA-I BLD-MCNC: 4.2 MG/DL (ref 4.4–5.2)
CA-I BLD-MCNC: 4.6 MG/DL (ref 4.4–5.2)
CA-I BLD-MCNC: 4.7 MG/DL (ref 4.4–5.2)
CA-I BLD-MCNC: 4.9 MG/DL (ref 4.4–5.2)
CALCIUM SERPL-MCNC: 8.3 MG/DL (ref 8.5–10.1)
CHLORIDE BLD-SCNC: 109 MMOL/L (ref 94–109)
CO2 SERPL-SCNC: 23 MMOL/L (ref 20–32)
CREAT SERPL-MCNC: 1.37 MG/DL (ref 0.66–1.25)
DIASTOLIC BLOOD PRESSURE - MUSE: NORMAL MMHG
ERYTHROCYTE [DISTWIDTH] IN BLOOD BY AUTOMATED COUNT: 13 % (ref 10–15)
GFR SERPL CREATININE-BSD FRML MDRD: 47 ML/MIN/1.73M2
GLUCOSE BLD-MCNC: 100 MG/DL (ref 70–99)
GLUCOSE BLD-MCNC: 104 MG/DL (ref 70–99)
GLUCOSE BLD-MCNC: 104 MG/DL (ref 70–99)
GLUCOSE BLD-MCNC: 111 MG/DL (ref 70–99)
GLUCOSE BLD-MCNC: 120 MG/DL (ref 70–99)
GLUCOSE BLD-MCNC: 124 MG/DL (ref 70–99)
GLUCOSE BLD-MCNC: 126 MG/DL (ref 70–99)
GLUCOSE BLD-MCNC: 139 MG/DL (ref 70–99)
GLUCOSE BLDC GLUCOMTR-MCNC: 122 MG/DL (ref 70–99)
HCO3 BLDA-SCNC: 23 MMOL/L (ref 21–28)
HCO3 BLDA-SCNC: 23 MMOL/L (ref 21–28)
HCO3 BLDA-SCNC: 24 MMOL/L (ref 21–28)
HCO3 BLDA-SCNC: 24 MMOL/L (ref 21–28)
HCO3 BLDA-SCNC: 25 MMOL/L (ref 21–28)
HCO3 BLDA-SCNC: 25 MMOL/L (ref 21–28)
HCO3 BLDV-SCNC: 27 MMOL/L (ref 21–28)
HCT VFR BLD AUTO: 36.1 % (ref 40–53)
HGB BLD-MCNC: 10.7 G/DL (ref 13.3–17.7)
HGB BLD-MCNC: 10.8 G/DL (ref 13.3–17.7)
HGB BLD-MCNC: 11.1 G/DL (ref 13.3–17.7)
HGB BLD-MCNC: 11.3 G/DL (ref 13.3–17.7)
HGB BLD-MCNC: 11.5 G/DL (ref 13.3–17.7)
HGB BLD-MCNC: 11.5 G/DL (ref 13.3–17.7)
HGB BLD-MCNC: 13 G/DL (ref 13.3–17.7)
HGB BLD-MCNC: 13.8 G/DL (ref 13.3–17.7)
INTERPRETATION ECG - MUSE: NORMAL
LACTATE BLD-SCNC: 0.8 MMOL/L
LACTATE BLD-SCNC: 0.9 MMOL/L
LACTATE BLD-SCNC: 1 MMOL/L
LACTATE BLD-SCNC: 1 MMOL/L
LACTATE BLD-SCNC: 1.1 MMOL/L
LACTATE BLD-SCNC: 1.2 MMOL/L
LACTATE BLD-SCNC: 1.3 MMOL/L
MCH RBC QN AUTO: 30.7 PG (ref 26.5–33)
MCHC RBC AUTO-ENTMCNC: 31.9 G/DL (ref 31.5–36.5)
MCV RBC AUTO: 97 FL (ref 78–100)
O2/TOTAL GAS SETTING VFR VENT: 100 %
O2/TOTAL GAS SETTING VFR VENT: 100 %
O2/TOTAL GAS SETTING VFR VENT: 60 %
O2/TOTAL GAS SETTING VFR VENT: 70 %
O2/TOTAL GAS SETTING VFR VENT: 70 %
O2/TOTAL GAS SETTING VFR VENT: 80 %
O2/TOTAL GAS SETTING VFR VENT: 80 %
P AXIS - MUSE: 30 DEGREES
P AXIS - MUSE: 40 DEGREES
P AXIS - MUSE: NORMAL DEGREES
PCO2 BLDA: 43 MM HG (ref 35–45)
PCO2 BLDA: 44 MM HG (ref 35–45)
PCO2 BLDA: 45 MM HG (ref 35–45)
PCO2 BLDA: 45 MM HG (ref 35–45)
PCO2 BLDA: 46 MM HG (ref 35–45)
PCO2 BLDA: 46 MM HG (ref 35–45)
PCO2 BLDV: 50 MM HG (ref 40–50)
PH BLDA: 7.31 [PH] (ref 7.35–7.45)
PH BLDA: 7.33 [PH] (ref 7.35–7.45)
PH BLDA: 7.34 [PH] (ref 7.35–7.45)
PH BLDA: 7.35 [PH] (ref 7.35–7.45)
PH BLDV: 7.34 [PH] (ref 7.32–7.43)
PLATELET # BLD AUTO: 205 10E3/UL (ref 150–450)
PLATELET # BLD AUTO: 228 10E3/UL (ref 150–450)
PO2 BLDA: 198 MM HG (ref 80–105)
PO2 BLDA: 275 MM HG (ref 80–105)
PO2 BLDA: 287 MM HG (ref 80–105)
PO2 BLDA: 362 MM HG (ref 80–105)
PO2 BLDA: 370 MM HG (ref 80–105)
PO2 BLDA: 382 MM HG (ref 80–105)
PO2 BLDV: 50 MM HG (ref 25–47)
POTASSIUM BLD-SCNC: 3.7 MMOL/L (ref 3.4–5.3)
POTASSIUM BLD-SCNC: 4.3 MMOL/L (ref 3.5–5)
POTASSIUM BLD-SCNC: 4.6 MMOL/L (ref 3.5–5)
POTASSIUM BLD-SCNC: 5 MMOL/L (ref 3.5–5)
POTASSIUM BLD-SCNC: 5.3 MMOL/L (ref 3.5–5)
POTASSIUM BLD-SCNC: 5.4 MMOL/L (ref 3.5–5)
POTASSIUM BLD-SCNC: 5.5 MMOL/L (ref 3.5–5)
POTASSIUM BLD-SCNC: 5.8 MMOL/L (ref 3.5–5)
PR INTERVAL - MUSE: 218 MS
PR INTERVAL - MUSE: 234 MS
PR INTERVAL - MUSE: NORMAL MS
QRS DURATION - MUSE: 80 MS
QRS DURATION - MUSE: 80 MS
QRS DURATION - MUSE: 90 MS
QT - MUSE: 340 MS
QT - MUSE: 372 MS
QT - MUSE: 372 MS
QTC - MUSE: 401 MS
QTC - MUSE: 424 MS
QTC - MUSE: 437 MS
R AXIS - MUSE: 0 DEGREES
R AXIS - MUSE: 1 DEGREES
R AXIS - MUSE: 33 DEGREES
RBC # BLD AUTO: 3.74 10E6/UL (ref 4.4–5.9)
SODIUM BLD-SCNC: 141 MMOL/L (ref 133–144)
SODIUM BLD-SCNC: 142 MMOL/L (ref 133–144)
SODIUM BLD-SCNC: 143 MMOL/L (ref 133–144)
SODIUM BLD-SCNC: 143 MMOL/L (ref 133–144)
SODIUM SERPL-SCNC: 139 MMOL/L (ref 133–144)
SYSTOLIC BLOOD PRESSURE - MUSE: NORMAL MMHG
T AXIS - MUSE: -22 DEGREES
T AXIS - MUSE: 10 DEGREES
T AXIS - MUSE: 21 DEGREES
VENTRICULAR RATE- MUSE: 78 BPM
VENTRICULAR RATE- MUSE: 83 BPM
VENTRICULAR RATE- MUSE: 84 BPM
WBC # BLD AUTO: 12.2 10E3/UL (ref 4–11)

## 2021-09-14 PROCEDURE — 85049 AUTOMATED PLATELET COUNT: CPT | Performed by: PHYSICIAN ASSISTANT

## 2021-09-14 PROCEDURE — 94640 AIRWAY INHALATION TREATMENT: CPT | Mod: 76

## 2021-09-14 PROCEDURE — 120N000001 HC R&B MED SURG/OB

## 2021-09-14 PROCEDURE — 250N000013 HC RX MED GY IP 250 OP 250 PS 637: Performed by: PHYSICIAN ASSISTANT

## 2021-09-14 PROCEDURE — 94640 AIRWAY INHALATION TREATMENT: CPT

## 2021-09-14 PROCEDURE — 250N000009 HC RX 250: Performed by: PHYSICIAN ASSISTANT

## 2021-09-14 PROCEDURE — 97535 SELF CARE MNGMENT TRAINING: CPT | Mod: GO

## 2021-09-14 PROCEDURE — 250N000011 HC RX IP 250 OP 636: Performed by: PHYSICIAN ASSISTANT

## 2021-09-14 PROCEDURE — 97110 THERAPEUTIC EXERCISES: CPT | Mod: GO

## 2021-09-14 PROCEDURE — 80048 BASIC METABOLIC PNL TOTAL CA: CPT | Performed by: PHYSICIAN ASSISTANT

## 2021-09-14 PROCEDURE — 36415 COLL VENOUS BLD VENIPUNCTURE: CPT | Performed by: PHYSICIAN ASSISTANT

## 2021-09-14 RX ADMIN — LEVALBUTEROL HYDROCHLORIDE 1.25 MG: 1.25 SOLUTION, CONCENTRATE RESPIRATORY (INHALATION) at 14:40

## 2021-09-14 RX ADMIN — ACETAMINOPHEN 650 MG: 325 TABLET, FILM COATED ORAL at 16:11

## 2021-09-14 RX ADMIN — POLYETHYLENE GLYCOL 3350 17 G: 17 POWDER, FOR SOLUTION ORAL at 09:24

## 2021-09-14 RX ADMIN — GUAIFENESIN 600 MG: 600 TABLET ORAL at 09:23

## 2021-09-14 RX ADMIN — METHOCARBAMOL 500 MG: 500 TABLET ORAL at 16:11

## 2021-09-14 RX ADMIN — ACETAMINOPHEN 650 MG: 325 TABLET, FILM COATED ORAL at 02:59

## 2021-09-14 RX ADMIN — ASPIRIN 325 MG: 325 TABLET, COATED ORAL at 09:23

## 2021-09-14 RX ADMIN — HEPARIN SODIUM 5000 UNITS: 5000 INJECTION INTRAVENOUS; SUBCUTANEOUS at 13:20

## 2021-09-14 RX ADMIN — AMIODARONE HYDROCHLORIDE 400 MG: 200 TABLET ORAL at 09:23

## 2021-09-14 RX ADMIN — ACETAMINOPHEN 650 MG: 325 TABLET, FILM COATED ORAL at 20:50

## 2021-09-14 RX ADMIN — ACETAMINOPHEN 650 MG: 325 TABLET, FILM COATED ORAL at 09:22

## 2021-09-14 RX ADMIN — AMIODARONE HYDROCHLORIDE 400 MG: 200 TABLET ORAL at 20:43

## 2021-09-14 RX ADMIN — SENNOSIDES AND DOCUSATE SODIUM 1 TABLET: 8.6; 5 TABLET ORAL at 20:44

## 2021-09-14 RX ADMIN — HEPARIN SODIUM 5000 UNITS: 5000 INJECTION INTRAVENOUS; SUBCUTANEOUS at 20:43

## 2021-09-14 RX ADMIN — ATORVASTATIN CALCIUM 40 MG: 40 TABLET, FILM COATED ORAL at 20:43

## 2021-09-14 RX ADMIN — METHOCARBAMOL 500 MG: 500 TABLET ORAL at 09:23

## 2021-09-14 RX ADMIN — SENNOSIDES AND DOCUSATE SODIUM 1 TABLET: 8.6; 5 TABLET ORAL at 09:23

## 2021-09-14 RX ADMIN — METHOCARBAMOL 500 MG: 500 TABLET ORAL at 02:59

## 2021-09-14 RX ADMIN — LEVALBUTEROL HYDROCHLORIDE 1.25 MG: 1.25 SOLUTION, CONCENTRATE RESPIRATORY (INHALATION) at 07:41

## 2021-09-14 RX ADMIN — GUAIFENESIN 600 MG: 600 TABLET ORAL at 20:43

## 2021-09-14 RX ADMIN — PANTOPRAZOLE SODIUM 40 MG: 40 TABLET, DELAYED RELEASE ORAL at 09:24

## 2021-09-14 RX ADMIN — METOPROLOL TARTRATE 25 MG: 25 TABLET, FILM COATED ORAL at 20:44

## 2021-09-14 RX ADMIN — HEPARIN SODIUM 5000 UNITS: 5000 INJECTION INTRAVENOUS; SUBCUTANEOUS at 05:38

## 2021-09-14 RX ADMIN — GABAPENTIN 100 MG: 100 CAPSULE ORAL at 20:43

## 2021-09-14 RX ADMIN — METOPROLOL TARTRATE 25 MG: 25 TABLET, FILM COATED ORAL at 09:24

## 2021-09-14 ASSESSMENT — ACTIVITIES OF DAILY LIVING (ADL)
ADLS_ACUITY_SCORE: 18

## 2021-09-14 ASSESSMENT — MIFFLIN-ST. JEOR: SCORE: 1738.13

## 2021-09-14 NOTE — PROGRESS NOTES
[facility]  Cardiovascular and Thoracic Surgery Daily Note          Assessment and Plan:   POD#4 s/p Coronary artery bypass grafting x 3, Left internal mammary artery to left anterior descending artery, Reversed saphenous vein graft to distal RCA, Reversed saphenous vein graft to obtuse marginal artery, Endoscopic vein harvest left lower extremity, Transesophageal echocardiogram by Dr. Zachary Pelayo on 9/10/21  -CVS: HR 60s-80s, BP 100s-140s. Pre op EF: 45-50%. Went into a fib with RVR on  and treated with amiodarone and converted to NSR. Transitioned to amiodarone 400mg BID. Aspirin 324 mg, lopressor 25 mg bid, atorvastatin 40 mg. CT/TPWs to stay in place for output. Heparin subcutaneous tid  -Resp: Extubated POD #1. Saturating well on RA. Working with IS and flutter valve. Continue  mucinex and nebulizers for mucus and cough  -Neuro:  Moving all extremities. Answering all questions appropriately.   -Renal: good UOP. Up about 3 kg from preoperative weight. Cr/BUN 1.37/32 (1.51/37) Mild GHASSAN on CKD. Baseline creatinine appears to be about 1.2-1.3. Will continue to hold diuretics.  -GI:  Tolerating diet. + BM. Continue bowel regimen  -:  Voiding well on own  -Endo: pre op A1c: 5.8.  Discontinue insulin sliding scale.   -FEN: replace electrolytes as needed. Na 139, K 3.7, Mg 2.7  Orders Placed This Encounter      Advance Diet as Tolerated: Regular Diet Adult; Regular Diet Adult; Low Saturated Fat Na <2400mg Diet    -ID: Temp (24hrs), Av.8  F (36.6  C), Min:97.7  F (36.5  C), Max:98.1  F (36.7  C), finished perioperative antibiotics, no fevers  WBC: 12.2. Completed perioperative abx. Leukocytosis likely related to stress from surgery, will continue to monitor.   -Heme: Hgb: 11.5. plt: 228. Acute blood loss anemia and thrombocytopenia related to surgery.   -Proph: PCD, ASA, BB, statin, PPI, sub q heparin  -Dispo: Encouraged IS/TCDB/amb. Sternal precautions. Will remove chest tubes/TPWs. CXR  "in am. Check labs in the am tomorrow. Looking at possible discharge to TCU in about 2 days. Labs in am. Continue to monitor.           Interval History:   States that pain is being controlled. Denies any hallucinations. Breathing is ok. Working with IS and flutter valve. States that his breath goes away quickly when he is ambulating. Denies any popping/clicking in his breast bone. Ambulating with rehab. Was able to get some sleep.          Medications:       amiodarone  400 mg Oral BID     aspirin  325 mg Oral Daily     atorvastatin  40 mg Oral QPM     gabapentin  100 mg Oral At Bedtime     guaiFENesin  600 mg Oral BID     heparin ANTICOAGULANT  5,000 Units Subcutaneous Q8H     insulin aspart  1-7 Units Subcutaneous TID AC     insulin aspart  1-5 Units Subcutaneous At Bedtime     levalbuterol  1.25 mg Nebulization 4x daily     metoprolol tartrate  25 mg Oral BID     pantoprazole  40 mg Oral or NG Tube Daily    Or     pantoprazole  40 mg Oral Daily     polyethylene glycol  17 g Oral Daily     senna-docusate  1 tablet Oral BID     sodium chloride (PF)  3 mL Intracatheter Q8H     acetaminophen, bisacodyl, glucose **OR** dextrose **OR** glucagon, hydrALAZINE, HYDROmorphone **OR** HYDROmorphone, lidocaine 4%, lidocaine (buffered or not buffered), magnesium hydroxide, methocarbamol, naloxone **OR** naloxone **OR** naloxone **OR** naloxone, ondansetron **OR** ondansetron, oxyCODONE **OR** oxyCODONE, BETA BLOCKER NOT PRESCRIBED, sodium chloride (PF)          Physical Exam:   Vitals were reviewed  Blood pressure 137/84, pulse 79, temperature 97.4  F (36.3  C), temperature source Oral, resp. rate 16, height 1.803 m (5' 11\"), weight 102.6 kg (226 lb 3.1 oz), SpO2 92 %.  Gen: up in chair, comfortable, -O2    Lungs: decreased bases, IS 1250 ml    Cardiovascular: RRR, telemetry SR 70s, +edema LE    Abdomen: BS+, NT/ND, soft    Derm: sternal incision D/I   L LE incisions D/I    CT decreased serous output, -leak    Echo (9/2/21) - " EF 47-51%   Borderline concentric LVH   Mild lateral wall hypokinesis    Mild to mod inferolateral wall hypokinesis   Mild AV stenosis   Grade 1 or early diastolic dysfunction   Mild mitral regurg   Trace tricuspid regurg    Carotid US (9/7/21) - less than 50% stenosis of bilateral ICA    Weight:   Vitals:    09/10/21 0610 09/11/21 0630 09/12/21 0500 09/13/21 0341   Weight: 97.1 kg (214 lb) 102.1 kg (225 lb 1.4 oz) 101.7 kg (224 lb 3.3 oz) 103 kg (227 lb 1.2 oz)    09/14/21 0500   Weight: 102.6 kg (226 lb 3.1 oz)            Data:   Labs:   Lab Results   Component Value Date    WBC 15.1 09/13/2021    WBC 8.8 03/09/2011     Lab Results   Component Value Date    RBC 3.69 09/13/2021    RBC 4.72 03/09/2011     Lab Results   Component Value Date    HGB 11.5 09/13/2021    HGB 14.5 03/09/2011     Lab Results   Component Value Date    HCT 36.0 09/13/2021    HCT 43.5 03/09/2011     No components found for: MCT  Lab Results   Component Value Date    MCV 98 09/13/2021    MCV 92 03/09/2011     Lab Results   Component Value Date    MCH 31.2 09/13/2021    MCH 30.7 03/09/2011     Lab Results   Component Value Date    MCHC 31.9 09/13/2021    MCHC 33.3 03/09/2011     Lab Results   Component Value Date    RDW 13.2 09/13/2021    RDW 13.2 03/09/2011     Lab Results   Component Value Date     09/13/2021     03/09/2011       Last Basic Metabolic Panel:  Lab Results   Component Value Date     09/13/2021     06/06/2017      Lab Results   Component Value Date    POTASSIUM 4.3 09/13/2021    POTASSIUM 4.4 06/06/2017     Lab Results   Component Value Date    CHLORIDE 107 09/13/2021    CHLORIDE 108 06/06/2017     Lab Results   Component Value Date    ARELI 8.2 09/13/2021    ARELI 9.6 06/06/2017     Lab Results   Component Value Date    CO2 24 09/13/2021    CO2 23 06/06/2017     Lab Results   Component Value Date    BUN 37 09/13/2021    BUN 17 06/06/2017     Lab Results   Component Value Date    CR 1.51 09/13/2021    CR 1.03  06/06/2017     Lab Results   Component Value Date     09/13/2021    GLC 89 06/06/2017       Pt seen with Dr Ansari and Dr Pelayo and plans made.    Kwasi Armendariz PA-C  [phone]

## 2021-09-14 NOTE — CONSULTS
PERCY  D: PERCY spoke with CV surgery Kwasi HANSON. Pt is interested in OPCR and not TCU. Pt will receive assistance at home and attend OPCR.     P: SW to assist with any SW related concerns    ALEXANDREA Dahl     [facility]

## 2021-09-14 NOTE — PLAN OF CARE
VSS, LS clear, NSR.  NGO, c/o frequent cough that increases his pain and makes it difficult to get sleep.  Transitioned to PO amio last velma.  CTx3, moderate output, no crepitus or AL. Adequate void, due for BM.  Up x1.  Robaxin and Tylenol for pain.

## 2021-09-15 ENCOUNTER — APPOINTMENT (OUTPATIENT)
Dept: OCCUPATIONAL THERAPY | Facility: CLINIC | Age: 86
DRG: 235 | End: 2021-09-15
Payer: MEDICARE

## 2021-09-15 LAB
ANION GAP SERPL CALCULATED.3IONS-SCNC: 4 MMOL/L (ref 3–14)
BUN SERPL-MCNC: 28 MG/DL (ref 7–30)
CALCIUM SERPL-MCNC: 8.3 MG/DL (ref 8.5–10.1)
CHLORIDE BLD-SCNC: 110 MMOL/L (ref 94–109)
CO2 SERPL-SCNC: 25 MMOL/L (ref 20–32)
CREAT SERPL-MCNC: 1.32 MG/DL (ref 0.66–1.25)
ERYTHROCYTE [DISTWIDTH] IN BLOOD BY AUTOMATED COUNT: 13.1 % (ref 10–15)
GFR SERPL CREATININE-BSD FRML MDRD: 49 ML/MIN/1.73M2
GLUCOSE BLD-MCNC: 98 MG/DL (ref 70–99)
HCT VFR BLD AUTO: 33.8 % (ref 40–53)
HGB BLD-MCNC: 11 G/DL (ref 13.3–17.7)
MAGNESIUM SERPL-MCNC: 2.4 MG/DL (ref 1.6–2.3)
MCH RBC QN AUTO: 31 PG (ref 26.5–33)
MCHC RBC AUTO-ENTMCNC: 32.5 G/DL (ref 31.5–36.5)
MCV RBC AUTO: 95 FL (ref 78–100)
PHOSPHATE SERPL-MCNC: 2.1 MG/DL (ref 2.5–4.5)
PLATELET # BLD AUTO: 244 10E3/UL (ref 150–450)
POTASSIUM BLD-SCNC: 4 MMOL/L (ref 3.4–5.3)
RBC # BLD AUTO: 3.55 10E6/UL (ref 4.4–5.9)
SODIUM SERPL-SCNC: 139 MMOL/L (ref 133–144)
WBC # BLD AUTO: 12.2 10E3/UL (ref 4–11)

## 2021-09-15 PROCEDURE — 85027 COMPLETE CBC AUTOMATED: CPT | Performed by: PHYSICIAN ASSISTANT

## 2021-09-15 PROCEDURE — 97110 THERAPEUTIC EXERCISES: CPT | Mod: GO | Performed by: OCCUPATIONAL THERAPIST

## 2021-09-15 PROCEDURE — 83735 ASSAY OF MAGNESIUM: CPT | Performed by: SURGERY

## 2021-09-15 PROCEDURE — 82374 ASSAY BLOOD CARBON DIOXIDE: CPT | Performed by: PHYSICIAN ASSISTANT

## 2021-09-15 PROCEDURE — 250N000013 HC RX MED GY IP 250 OP 250 PS 637: Performed by: PHYSICIAN ASSISTANT

## 2021-09-15 PROCEDURE — 250N000011 HC RX IP 250 OP 636: Performed by: PHYSICIAN ASSISTANT

## 2021-09-15 PROCEDURE — 84100 ASSAY OF PHOSPHORUS: CPT | Performed by: SURGERY

## 2021-09-15 PROCEDURE — 120N000001 HC R&B MED SURG/OB

## 2021-09-15 PROCEDURE — 97535 SELF CARE MNGMENT TRAINING: CPT | Mod: GO | Performed by: OCCUPATIONAL THERAPIST

## 2021-09-15 PROCEDURE — 250N000013 HC RX MED GY IP 250 OP 250 PS 637: Performed by: SURGERY

## 2021-09-15 PROCEDURE — 36415 COLL VENOUS BLD VENIPUNCTURE: CPT | Performed by: PHYSICIAN ASSISTANT

## 2021-09-15 RX ADMIN — GUAIFENESIN 600 MG: 600 TABLET ORAL at 20:35

## 2021-09-15 RX ADMIN — HEPARIN SODIUM 5000 UNITS: 5000 INJECTION INTRAVENOUS; SUBCUTANEOUS at 13:45

## 2021-09-15 RX ADMIN — HEPARIN SODIUM 5000 UNITS: 5000 INJECTION INTRAVENOUS; SUBCUTANEOUS at 05:32

## 2021-09-15 RX ADMIN — ASPIRIN 325 MG: 325 TABLET, COATED ORAL at 09:53

## 2021-09-15 RX ADMIN — POTASSIUM & SODIUM PHOSPHATES POWDER PACK 280-160-250 MG 1 PACKET: 280-160-250 PACK at 11:29

## 2021-09-15 RX ADMIN — POTASSIUM & SODIUM PHOSPHATES POWDER PACK 280-160-250 MG 1 PACKET: 280-160-250 PACK at 17:35

## 2021-09-15 RX ADMIN — METHOCARBAMOL 500 MG: 500 TABLET ORAL at 13:45

## 2021-09-15 RX ADMIN — PANTOPRAZOLE SODIUM 40 MG: 40 TABLET, DELAYED RELEASE ORAL at 09:53

## 2021-09-15 RX ADMIN — GABAPENTIN 100 MG: 100 CAPSULE ORAL at 21:24

## 2021-09-15 RX ADMIN — POTASSIUM & SODIUM PHOSPHATES POWDER PACK 280-160-250 MG 1 PACKET: 280-160-250 PACK at 21:24

## 2021-09-15 RX ADMIN — METOPROLOL TARTRATE 25 MG: 25 TABLET, FILM COATED ORAL at 20:35

## 2021-09-15 RX ADMIN — AMIODARONE HYDROCHLORIDE 400 MG: 200 TABLET ORAL at 20:35

## 2021-09-15 RX ADMIN — AMIODARONE HYDROCHLORIDE 400 MG: 200 TABLET ORAL at 09:53

## 2021-09-15 RX ADMIN — GUAIFENESIN 600 MG: 600 TABLET ORAL at 09:53

## 2021-09-15 RX ADMIN — METOPROLOL TARTRATE 25 MG: 25 TABLET, FILM COATED ORAL at 09:53

## 2021-09-15 RX ADMIN — ACETAMINOPHEN 650 MG: 325 TABLET, FILM COATED ORAL at 13:45

## 2021-09-15 RX ADMIN — SENNOSIDES AND DOCUSATE SODIUM 1 TABLET: 8.6; 5 TABLET ORAL at 09:53

## 2021-09-15 RX ADMIN — HEPARIN SODIUM 5000 UNITS: 5000 INJECTION INTRAVENOUS; SUBCUTANEOUS at 20:35

## 2021-09-15 RX ADMIN — ATORVASTATIN CALCIUM 40 MG: 40 TABLET, FILM COATED ORAL at 20:36

## 2021-09-15 RX ADMIN — METHOCARBAMOL 500 MG: 500 TABLET ORAL at 05:32

## 2021-09-15 ASSESSMENT — ACTIVITIES OF DAILY LIVING (ADL)
ADLS_ACUITY_SCORE: 18

## 2021-09-15 ASSESSMENT — MIFFLIN-ST. JEOR: SCORE: 1733.13

## 2021-09-15 NOTE — PROGRESS NOTES
[facility]  Cardiovascular and Thoracic Surgery Daily Note          Assessment and Plan:   POD#5 s/p Coronary artery bypass grafting x 3, Left internal mammary artery to left anterior descending artery, Reversed saphenous vein graft to distal RCA, Reversed saphenous vein graft to obtuse marginal artery, Endoscopic vein harvest left lower extremity, Transesophageal echocardiogram by Dr. Zachary Pelayo on 9/10/21  -CVS: HR70s, BP 130s-140s. Pre op EF: 45-50%. Went into a fib with RVR on  and treated with amiodarone and converted to NSR. Transitioned to amiodarone 400mg BID. Aspirin 324 mg, lopressor 25 mg bid, atorvastatin 40 mg. CT/TPWs to stay in place for output to waterseal today. Heparin subcutaneous tid  -Resp: Extubated POD #1. Saturating well on RA. Working with IS and flutter valve. Continue  mucinex and nebulizers for mucus and cough  -Neuro:  Moving all extremities. Answering all questions appropriately.   -Renal: good UOP. Up about 2<3 kg from preoperative weight. Cr/BUN 1.32/28 (1.51/37) Mild GHASSAN on CKD. Baseline creatinine appears to be about 1.2-1.3. Will continue to hold diuretics.  -GI:  Tolerating diet. + BM. Continue bowel regimen  -:  Voiding well on own  -Endo: pre op A1c: 5.8.  Discontinue insulin sliding scale.   -FEN: replace electrolytes as needed. Na 139, K 3.7, Mg 2.7  Orders Placed This Encounter      Advance Diet as Tolerated: Regular Diet Adult; Regular Diet Adult; Low Saturated Fat Na <2400mg Diet  -ID: WBC: 12.2, Temp (24hrs), Av.8  F (36.6  C), Min:97.7  F (36.5  C), Max:98.1  F (36.7  C), finished perioperative antibiotics, no fevers  -Heme: Hgb: 11.0. plt: 244. Acute blood loss anemia and thrombocytopenia related to surgery.   -Proph: PCD, ASA, BB, statin, PPI, sub q heparin  -Dispo: Encouraged IS/TCDB/amb. Sternal precautions. CXR pending. Discharge to home. Continue to monitor.           Interval History:   Lying in bed, breathing stable, tolerating  "diet, working with therapies, slept ok last night         Medications:       amiodarone  400 mg Oral BID     aspirin  325 mg Oral Daily     atorvastatin  40 mg Oral QPM     gabapentin  100 mg Oral At Bedtime     guaiFENesin  600 mg Oral BID     heparin ANTICOAGULANT  5,000 Units Subcutaneous Q8H     levalbuterol  1.25 mg Nebulization 4x daily     metoprolol tartrate  25 mg Oral BID     pantoprazole  40 mg Oral Daily     polyethylene glycol  17 g Oral Daily     senna-docusate  1 tablet Oral BID     sodium chloride (PF)  3 mL Intracatheter Q8H     @MEDRN-@          Physical Exam:   Vitals were reviewed  Blood pressure (!) 177/94, pulse 85, temperature 98.1  F (36.7  C), temperature source Oral, resp. rate 18, height 1.803 m (5' 11\"), weight 102.1 kg (225 lb 1.4 oz), SpO2 95 %.  Rhythm: NSR    Lungs: course    Cardiovascular: s1/s2, no m/r/g    Abdomen: s/nt/nd    Extremeties: no LE edema    Incision: cdi    CT: na    Weight:   Vitals:    09/11/21 0630 09/12/21 0500 09/13/21 0341 09/14/21 0500   Weight: 102.1 kg (225 lb 1.4 oz) 101.7 kg (224 lb 3.3 oz) 103 kg (227 lb 1.2 oz) 102.6 kg (226 lb 3.1 oz)    09/15/21 0622   Weight: 102.1 kg (225 lb 1.4 oz)            Data:   Labs:   Lab Results   Component Value Date    WBC 12.2 09/15/2021    WBC 8.8 03/09/2011     Lab Results   Component Value Date    RBC 3.55 09/15/2021    RBC 4.72 03/09/2011     Lab Results   Component Value Date    HGB 11.0 09/15/2021    HGB 14.5 03/09/2011     Lab Results   Component Value Date    HCT 33.8 09/15/2021    HCT 43.5 03/09/2011     No components found for: MCT  Lab Results   Component Value Date    MCV 95 09/15/2021    MCV 92 03/09/2011     Lab Results   Component Value Date    MCH 31.0 09/15/2021    MCH 30.7 03/09/2011     Lab Results   Component Value Date    MCHC 32.5 09/15/2021    MCHC 33.3 03/09/2011     Lab Results   Component Value Date    RDW 13.1 09/15/2021    RDW 13.2 03/09/2011     Lab Results   Component Value Date     " 09/15/2021     03/09/2011       Last Basic Metabolic Panel:  Lab Results   Component Value Date     09/15/2021     06/06/2017      Lab Results   Component Value Date    POTASSIUM 4.0 09/15/2021    POTASSIUM 4.4 06/06/2017     Lab Results   Component Value Date    CHLORIDE 110 09/15/2021    CHLORIDE 108 06/06/2017     Lab Results   Component Value Date    ARELI 8.3 09/15/2021    ARELI 9.6 06/06/2017     Lab Results   Component Value Date    CO2 25 09/15/2021    CO2 23 06/06/2017     Lab Results   Component Value Date    BUN 28 09/15/2021    BUN 17 06/06/2017     Lab Results   Component Value Date    CR 1.32 09/15/2021    CR 1.03 06/06/2017     Lab Results   Component Value Date    GLC 98 09/15/2021    GLC 89 06/06/2017       Seen and discussed with Dr. Paul Lezama PA-C  Pager #: [phone]

## 2021-09-15 NOTE — PLAN OF CARE
A&O x4. VSS. Up with SBA. Tolerating regular diet. Tele SR. IV SL. Sternal incision with some edema and ecchymosis. Bruising on leg from harvest site.  Pain controlled with Tylenol and Robaxin. Plan to monitor today and possibly discharge Thursday. Continue to monitor.

## 2021-09-15 NOTE — PLAN OF CARE
A&O x4, forgetful. VSS on RA. Up with assist x1/SBA with walker/gait belt.   LS clear, shortness of breath with activity, frequent cough which is his biggest complaint of pain- managed with tylenol and robaxin  BS+. Flatus+. Voids frequently.   Incisions approximated, ecchymosis, some edema at top of sternal incision.   CT x3 and pacer wires removed at 1440 today.

## 2021-09-16 ENCOUNTER — APPOINTMENT (OUTPATIENT)
Dept: OCCUPATIONAL THERAPY | Facility: CLINIC | Age: 86
DRG: 235 | End: 2021-09-16
Payer: MEDICARE

## 2021-09-16 LAB
ANION GAP SERPL CALCULATED.3IONS-SCNC: 1 MMOL/L (ref 3–14)
BUN SERPL-MCNC: 24 MG/DL (ref 7–30)
CALCIUM SERPL-MCNC: 8.1 MG/DL (ref 8.5–10.1)
CHLORIDE BLD-SCNC: 109 MMOL/L (ref 94–109)
CO2 SERPL-SCNC: 30 MMOL/L (ref 20–32)
CREAT SERPL-MCNC: 1.47 MG/DL (ref 0.66–1.25)
ERYTHROCYTE [DISTWIDTH] IN BLOOD BY AUTOMATED COUNT: 13.2 % (ref 10–15)
GFR SERPL CREATININE-BSD FRML MDRD: 43 ML/MIN/1.73M2
GLUCOSE BLD-MCNC: 95 MG/DL (ref 70–99)
HCT VFR BLD AUTO: 35.4 % (ref 40–53)
HGB BLD-MCNC: 11.1 G/DL (ref 13.3–17.7)
MAGNESIUM SERPL-MCNC: 2.3 MG/DL (ref 1.6–2.3)
MCH RBC QN AUTO: 30.3 PG (ref 26.5–33)
MCHC RBC AUTO-ENTMCNC: 31.4 G/DL (ref 31.5–36.5)
MCV RBC AUTO: 97 FL (ref 78–100)
PHOSPHATE SERPL-MCNC: 2.9 MG/DL (ref 2.5–4.5)
PLATELET # BLD AUTO: 282 10E3/UL (ref 150–450)
POTASSIUM BLD-SCNC: 3.9 MMOL/L (ref 3.4–5.3)
RBC # BLD AUTO: 3.66 10E6/UL (ref 4.4–5.9)
SODIUM SERPL-SCNC: 140 MMOL/L (ref 133–144)
WBC # BLD AUTO: 12.7 10E3/UL (ref 4–11)

## 2021-09-16 PROCEDURE — 250N000013 HC RX MED GY IP 250 OP 250 PS 637: Performed by: PHYSICIAN ASSISTANT

## 2021-09-16 PROCEDURE — 36415 COLL VENOUS BLD VENIPUNCTURE: CPT | Performed by: PHYSICIAN ASSISTANT

## 2021-09-16 PROCEDURE — 999N000157 HC STATISTIC RCP TIME EA 10 MIN

## 2021-09-16 PROCEDURE — 94640 AIRWAY INHALATION TREATMENT: CPT | Mod: 76

## 2021-09-16 PROCEDURE — 84100 ASSAY OF PHOSPHORUS: CPT | Performed by: SURGERY

## 2021-09-16 PROCEDURE — 85014 HEMATOCRIT: CPT | Performed by: PHYSICIAN ASSISTANT

## 2021-09-16 PROCEDURE — 94640 AIRWAY INHALATION TREATMENT: CPT

## 2021-09-16 PROCEDURE — 80048 BASIC METABOLIC PNL TOTAL CA: CPT | Performed by: PHYSICIAN ASSISTANT

## 2021-09-16 PROCEDURE — 250N000009 HC RX 250: Performed by: PHYSICIAN ASSISTANT

## 2021-09-16 PROCEDURE — 120N000001 HC R&B MED SURG/OB

## 2021-09-16 PROCEDURE — 97110 THERAPEUTIC EXERCISES: CPT | Mod: GO

## 2021-09-16 PROCEDURE — 83735 ASSAY OF MAGNESIUM: CPT | Performed by: SURGERY

## 2021-09-16 PROCEDURE — 250N000011 HC RX IP 250 OP 636: Performed by: PHYSICIAN ASSISTANT

## 2021-09-16 RX ORDER — METOPROLOL TARTRATE 25 MG/1
25 TABLET, FILM COATED ORAL ONCE
Status: DISCONTINUED | OUTPATIENT
Start: 2021-09-16 | End: 2021-09-16

## 2021-09-16 RX ORDER — METOPROLOL SUCCINATE 50 MG/1
50 TABLET, EXTENDED RELEASE ORAL 2 TIMES DAILY
Status: DISCONTINUED | OUTPATIENT
Start: 2021-09-17 | End: 2021-09-17 | Stop reason: HOSPADM

## 2021-09-16 RX ORDER — METOPROLOL SUCCINATE 50 MG/1
50 TABLET, EXTENDED RELEASE ORAL ONCE
Status: COMPLETED | OUTPATIENT
Start: 2021-09-16 | End: 2021-09-16

## 2021-09-16 RX ADMIN — HEPARIN SODIUM 5000 UNITS: 5000 INJECTION INTRAVENOUS; SUBCUTANEOUS at 13:47

## 2021-09-16 RX ADMIN — HEPARIN SODIUM 5000 UNITS: 5000 INJECTION INTRAVENOUS; SUBCUTANEOUS at 04:32

## 2021-09-16 RX ADMIN — METOPROLOL SUCCINATE 50 MG: 50 TABLET, EXTENDED RELEASE ORAL at 20:28

## 2021-09-16 RX ADMIN — LEVALBUTEROL HYDROCHLORIDE 1.25 MG: 1.25 SOLUTION, CONCENTRATE RESPIRATORY (INHALATION) at 15:26

## 2021-09-16 RX ADMIN — ATORVASTATIN CALCIUM 40 MG: 40 TABLET, FILM COATED ORAL at 20:26

## 2021-09-16 RX ADMIN — AMIODARONE HYDROCHLORIDE 400 MG: 200 TABLET ORAL at 20:29

## 2021-09-16 RX ADMIN — LEVALBUTEROL HYDROCHLORIDE 1.25 MG: 1.25 SOLUTION, CONCENTRATE RESPIRATORY (INHALATION) at 20:05

## 2021-09-16 RX ADMIN — HEPARIN SODIUM 5000 UNITS: 5000 INJECTION INTRAVENOUS; SUBCUTANEOUS at 20:29

## 2021-09-16 RX ADMIN — METOPROLOL TARTRATE 25 MG: 25 TABLET, FILM COATED ORAL at 08:16

## 2021-09-16 RX ADMIN — PANTOPRAZOLE SODIUM 40 MG: 40 TABLET, DELAYED RELEASE ORAL at 08:16

## 2021-09-16 RX ADMIN — LEVALBUTEROL HYDROCHLORIDE 1.25 MG: 1.25 SOLUTION, CONCENTRATE RESPIRATORY (INHALATION) at 11:29

## 2021-09-16 RX ADMIN — GUAIFENESIN 600 MG: 600 TABLET ORAL at 08:16

## 2021-09-16 RX ADMIN — GUAIFENESIN 600 MG: 600 TABLET ORAL at 20:26

## 2021-09-16 RX ADMIN — ACETAMINOPHEN 650 MG: 325 TABLET, FILM COATED ORAL at 08:16

## 2021-09-16 RX ADMIN — METHOCARBAMOL 500 MG: 500 TABLET ORAL at 06:35

## 2021-09-16 RX ADMIN — GABAPENTIN 100 MG: 100 CAPSULE ORAL at 22:55

## 2021-09-16 RX ADMIN — AMIODARONE HYDROCHLORIDE 400 MG: 200 TABLET ORAL at 08:16

## 2021-09-16 RX ADMIN — SENNOSIDES AND DOCUSATE SODIUM 1 TABLET: 8.6; 5 TABLET ORAL at 20:30

## 2021-09-16 RX ADMIN — ASPIRIN 325 MG: 325 TABLET, COATED ORAL at 08:16

## 2021-09-16 RX ADMIN — LEVALBUTEROL HYDROCHLORIDE 1.25 MG: 1.25 SOLUTION, CONCENTRATE RESPIRATORY (INHALATION) at 08:39

## 2021-09-16 ASSESSMENT — ACTIVITIES OF DAILY LIVING (ADL)
ADLS_ACUITY_SCORE: 9
ADLS_ACUITY_SCORE: 7
ADLS_ACUITY_SCORE: 9
ADLS_ACUITY_SCORE: 18

## 2021-09-16 ASSESSMENT — MIFFLIN-ST. JEOR: SCORE: 1733.13

## 2021-09-16 NOTE — PLAN OF CARE
1900h-0700h: Patient AOx4, forgetful at times. O2Sat 94% on RA. Bilateral wheezes on upper lobes; patient refused nebulization. Explained to patient the importance of nebs to address his wheezes, as per patient it makes him cough a lot & he wanted to sleep. Had a shower tonight. Sternum incision closed w/ liq bandage, GIOVANA. CT sites leaking w/ serous drainage, cleansed w/ NS & gauze dressing applied.  L groin & L lateral aspect of mid calf incision w/ liq bandage, GIOVANA. Pitting edema 3+ on BLE, palpable dorsal pulses, denies pain or numbness.  Multiple bruises on CHARO & LL extremies. Tolerating regular diet. Ambulates w/ FWW, refuses to use GB. Dyspnea on exertion. Voiding adequately. Normoactive BS x4, refused senna at night.  Tele: NSR     0600h:: BMP & CBC.  0638h: Chest pain 3/10 when coughing. PRN robaxin given.

## 2021-09-16 NOTE — PLAN OF CARE
A&O x4 EX forgetful. VSS on RA EX BP elevated at times after activity. Tele NSR. Up SBA. LS clear, diminished at bases with dry cough, Doing IS several times throughout the day. BS+. Reports he had a BM today. Voiding. Incisions approximated, edema a top of sternal incision, scant serosanguinous drainage appears to be from very bottom of incision above previous CT site.  CT were removed yesterday afternoon, due for shower this evening.   Pain with coughing.

## 2021-09-16 NOTE — PROGRESS NOTES
[facility]  Cardiovascular and Thoracic Surgery Daily Note          Assessment and Plan:   POD#6 s/p Coronary artery bypass grafting x 3, Left internal mammary artery to left anterior descending artery, Reversed saphenous vein graft to distal RCA, Reversed saphenous vein graft to obtuse marginal artery, Endoscopic vein harvest left lower extremity, Transesophageal echocardiogram by Dr. Zachary Pelayo on 9/10/21  -CVS: HR 70s, BP 120s-130s. Pre op EF: 45-50%. Went into a fib with RVR on  and treated with amiodarone and converted to NSR. Amiodarone 400mg BID. Aspirin 324 mg, lopressor 25 mg bid, atorvastatin 40 mg. Heparin subcutaneous tid  -Resp: Extubated POD #1. Saturating well on RA. Working with IS and flutter valve. Continue mucinex and nebulizers for mucus and cough  -Neuro:  Moving all extremities. Answering all questions appropriately.   -Renal: good UOP. Up about 2<3 kg from preoperative weight. Cr/BUN 1.32/28 (1.51/37) Mild GHASSAN on CKD. Baseline creatinine appears to be about 1.2-1.3. Will continue to hold diuretics.  -GI:  Tolerating diet. + BM. Continue bowel regimen  -:  Voiding well on own  -Endo: pre op A1c: 5.8.  Discontinue insulin sliding scale.   -FEN: replace electrolytes as needed. Na 140, K 3.9, Mg 2.3  Orders Placed This Encounter      Advance Diet as Tolerated: Regular Diet Adult; Regular Diet Adult; Low Saturated Fat Na <2400mg Diet  -ID: WBC: 12.7, Temp (24hrs), Av.3  F (36.8  C), Min:98.1  F (36.7  C), Max:98.8  F (37.1  C), finished perioperative antibiotics, no fevers  -Heme: Hgb: 11.1. plt: 282. Acute blood loss anemia and thrombocytopenia related to surgery.   -Proph: PCD, ASA, BB, statin, PPI, sub q heparin  -Dispo: Encouraged IS/TCDB/amb. Sternal precautions. Discharge to home. Continue to monitor.           Interval History:   Sitting up in chair, legs ace wrapped by myself, encouraged to elevate, breathing stable, tolerating diet          Medications:  "      amiodarone  400 mg Oral BID     aspirin  325 mg Oral Daily     atorvastatin  40 mg Oral QPM     gabapentin  100 mg Oral At Bedtime     guaiFENesin  600 mg Oral BID     heparin ANTICOAGULANT  5,000 Units Subcutaneous Q8H     levalbuterol  1.25 mg Nebulization 4x daily     metoprolol tartrate  25 mg Oral BID     pantoprazole  40 mg Oral Daily     polyethylene glycol  17 g Oral Daily     senna-docusate  1 tablet Oral BID     sodium chloride (PF)  3 mL Intracatheter Q8H     [facility]@          Physical Exam:   Vitals were reviewed  Blood pressure 121/73, pulse 74, temperature 98.4  F (36.9  C), temperature source Oral, resp. rate 20, height 1.803 m (5' 11\"), weight 102.1 kg (225 lb 1.4 oz), SpO2 93 %.  Rhythm: NSR    Lungs: course    Cardiovascular: s1/s2, no m/r/g    Abdomen: s/nt/nd    Extremeties: no LE edema    Incision: cdi    CT: na    Weight:   Vitals:    09/12/21 0500 09/13/21 0341 09/14/21 0500 09/15/21 0622   Weight: 101.7 kg (224 lb 3.3 oz) 103 kg (227 lb 1.2 oz) 102.6 kg (226 lb 3.1 oz) 102.1 kg (225 lb 1.4 oz)    09/16/21 0530   Weight: 102.1 kg (225 lb 1.4 oz)            Data:   Labs:   Lab Results   Component Value Date    WBC 12.7 09/16/2021    WBC 8.8 03/09/2011     Lab Results   Component Value Date    RBC 3.66 09/16/2021    RBC 4.72 03/09/2011     Lab Results   Component Value Date    HGB 11.1 09/16/2021    HGB 14.5 03/09/2011     Lab Results   Component Value Date    HCT 35.4 09/16/2021    HCT 43.5 03/09/2011     No components found for: MCT  Lab Results   Component Value Date    MCV 97 09/16/2021    MCV 92 03/09/2011     Lab Results   Component Value Date    MCH 30.3 09/16/2021    MCH 30.7 03/09/2011     Lab Results   Component Value Date    MCHC 31.4 09/16/2021    MCHC 33.3 03/09/2011     Lab Results   Component Value Date    RDW 13.2 09/16/2021    RDW 13.2 03/09/2011     Lab Results   Component Value Date     09/16/2021     03/09/2011       Last Basic Metabolic Panel:  Lab " Results   Component Value Date     09/16/2021     06/06/2017      Lab Results   Component Value Date    POTASSIUM 3.9 09/16/2021    POTASSIUM 4.4 06/06/2017     Lab Results   Component Value Date    CHLORIDE 109 09/16/2021    CHLORIDE 108 06/06/2017     Lab Results   Component Value Date    ARELI 8.1 09/16/2021    ARELI 9.6 06/06/2017     Lab Results   Component Value Date    CO2 30 09/16/2021    CO2 23 06/06/2017     Lab Results   Component Value Date    BUN 24 09/16/2021    BUN 17 06/06/2017     Lab Results   Component Value Date    CR 1.47 09/16/2021    CR 1.03 06/06/2017     Lab Results   Component Value Date    GLC 95 09/16/2021    GLC 89 06/06/2017     Seen and discussed with AWILDA SamuelsC  Pager #: [phone]

## 2021-09-16 NOTE — PLAN OF CARE
A&O x 4. VSS on room air. Tele: SR with occ. PVCs. SBA with walker, refusing gait belt. Reg low sat fat diet, tolerating well. PIV saline locked. Sternal and LLE incisions WDL, some ecchymosis. Denies pain, except for when coughing. Cough improving with Mucinex and nebs, some NGO noted. Voiding spontaneously. BS active, +BM. Hope to discharge home tomorrow. Continue plan of care.

## 2021-09-17 ENCOUNTER — APPOINTMENT (OUTPATIENT)
Dept: OCCUPATIONAL THERAPY | Facility: CLINIC | Age: 86
DRG: 235 | End: 2021-09-17
Payer: MEDICARE

## 2021-09-17 ENCOUNTER — APPOINTMENT (OUTPATIENT)
Dept: GENERAL RADIOLOGY | Facility: CLINIC | Age: 86
DRG: 235 | End: 2021-09-17
Attending: PHYSICIAN ASSISTANT
Payer: MEDICARE

## 2021-09-17 VITALS
SYSTOLIC BLOOD PRESSURE: 142 MMHG | DIASTOLIC BLOOD PRESSURE: 85 MMHG | OXYGEN SATURATION: 95 % | BODY MASS INDEX: 31.98 KG/M2 | WEIGHT: 228.4 LBS | HEIGHT: 71 IN | HEART RATE: 66 BPM | TEMPERATURE: 98 F | RESPIRATION RATE: 16 BRPM

## 2021-09-17 LAB
ALBUMIN UR-MCNC: 30 MG/DL
ALBUMIN UR-MCNC: 70 MG/DL
ANION GAP SERPL CALCULATED.3IONS-SCNC: 3 MMOL/L (ref 3–14)
APPEARANCE UR: CLEAR
APPEARANCE UR: CLEAR
BILIRUB UR QL STRIP: NEGATIVE
BILIRUB UR QL STRIP: NEGATIVE
BUN SERPL-MCNC: 20 MG/DL (ref 7–30)
CALCIUM SERPL-MCNC: 8.4 MG/DL (ref 8.5–10.1)
CHLORIDE BLD-SCNC: 109 MMOL/L (ref 94–109)
CO2 SERPL-SCNC: 28 MMOL/L (ref 20–32)
COLOR UR AUTO: YELLOW
COLOR UR AUTO: YELLOW
CREAT SERPL-MCNC: 1.35 MG/DL (ref 0.66–1.25)
ERYTHROCYTE [DISTWIDTH] IN BLOOD BY AUTOMATED COUNT: 13.3 % (ref 10–15)
GFR SERPL CREATININE-BSD FRML MDRD: 48 ML/MIN/1.73M2
GLUCOSE BLD-MCNC: 119 MG/DL (ref 70–99)
GLUCOSE UR STRIP-MCNC: NEGATIVE MG/DL
GLUCOSE UR STRIP-MCNC: NEGATIVE MG/DL
HCT VFR BLD AUTO: 34.5 % (ref 40–53)
HGB BLD-MCNC: 10.8 G/DL (ref 13.3–17.7)
HGB UR QL STRIP: ABNORMAL
HGB UR QL STRIP: ABNORMAL
KETONES UR STRIP-MCNC: NEGATIVE MG/DL
KETONES UR STRIP-MCNC: NEGATIVE MG/DL
LEUKOCYTE ESTERASE UR QL STRIP: NEGATIVE
LEUKOCYTE ESTERASE UR QL STRIP: NEGATIVE
MAGNESIUM SERPL-MCNC: 2.2 MG/DL (ref 1.6–2.3)
MCH RBC QN AUTO: 30.3 PG (ref 26.5–33)
MCHC RBC AUTO-ENTMCNC: 31.3 G/DL (ref 31.5–36.5)
MCV RBC AUTO: 97 FL (ref 78–100)
MUCOUS THREADS #/AREA URNS LPF: PRESENT /LPF
MUCOUS THREADS #/AREA URNS LPF: PRESENT /LPF
NITRATE UR QL: NEGATIVE
NITRATE UR QL: NEGATIVE
PH UR STRIP: 5.5 [PH] (ref 5–7)
PH UR STRIP: 5.5 [PH] (ref 5–7)
PHOSPHATE SERPL-MCNC: 3.2 MG/DL (ref 2.5–4.5)
PLATELET # BLD AUTO: 307 10E3/UL (ref 150–450)
POTASSIUM BLD-SCNC: 4 MMOL/L (ref 3.4–5.3)
RBC # BLD AUTO: 3.56 10E6/UL (ref 4.4–5.9)
RBC URINE: 17 /HPF
RBC URINE: 8 /HPF
SODIUM SERPL-SCNC: 140 MMOL/L (ref 133–144)
SP GR UR STRIP: 1.02 (ref 1–1.03)
SP GR UR STRIP: 1.02 (ref 1–1.03)
SQUAMOUS EPITHELIAL: <1 /HPF
UROBILINOGEN UR STRIP-MCNC: NORMAL MG/DL
UROBILINOGEN UR STRIP-MCNC: NORMAL MG/DL
WBC # BLD AUTO: 13.4 10E3/UL (ref 4–11)
WBC URINE: 5 /HPF
WBC URINE: 7 /HPF

## 2021-09-17 PROCEDURE — 80048 BASIC METABOLIC PNL TOTAL CA: CPT | Performed by: PHYSICIAN ASSISTANT

## 2021-09-17 PROCEDURE — 81001 URINALYSIS AUTO W/SCOPE: CPT | Performed by: SURGERY

## 2021-09-17 PROCEDURE — 97110 THERAPEUTIC EXERCISES: CPT | Mod: GO

## 2021-09-17 PROCEDURE — 81001 URINALYSIS AUTO W/SCOPE: CPT | Performed by: PHYSICIAN ASSISTANT

## 2021-09-17 PROCEDURE — 999N000157 HC STATISTIC RCP TIME EA 10 MIN

## 2021-09-17 PROCEDURE — 71046 X-RAY EXAM CHEST 2 VIEWS: CPT

## 2021-09-17 PROCEDURE — 85027 COMPLETE CBC AUTOMATED: CPT | Performed by: PHYSICIAN ASSISTANT

## 2021-09-17 PROCEDURE — 250N000013 HC RX MED GY IP 250 OP 250 PS 637: Performed by: PHYSICIAN ASSISTANT

## 2021-09-17 PROCEDURE — 84100 ASSAY OF PHOSPHORUS: CPT | Performed by: SURGERY

## 2021-09-17 PROCEDURE — 250N000011 HC RX IP 250 OP 636: Performed by: PHYSICIAN ASSISTANT

## 2021-09-17 PROCEDURE — 36415 COLL VENOUS BLD VENIPUNCTURE: CPT | Performed by: PHYSICIAN ASSISTANT

## 2021-09-17 PROCEDURE — 94640 AIRWAY INHALATION TREATMENT: CPT

## 2021-09-17 PROCEDURE — 250N000009 HC RX 250: Performed by: PHYSICIAN ASSISTANT

## 2021-09-17 PROCEDURE — 83735 ASSAY OF MAGNESIUM: CPT | Performed by: SURGERY

## 2021-09-17 RX ORDER — OXYCODONE HYDROCHLORIDE 5 MG/1
5 TABLET ORAL EVERY 6 HOURS PRN
Qty: 30 TABLET | Refills: 0 | Status: SHIPPED | OUTPATIENT
Start: 2021-09-17 | End: 2023-08-03

## 2021-09-17 RX ORDER — AMIODARONE HYDROCHLORIDE 200 MG/1
200 TABLET ORAL DAILY
Qty: 30 TABLET | Refills: 0 | Status: SHIPPED | OUTPATIENT
Start: 2021-09-20 | End: 2022-06-03

## 2021-09-17 RX ORDER — METHOCARBAMOL 500 MG/1
500 TABLET, FILM COATED ORAL EVERY 6 HOURS PRN
Qty: 40 TABLET | Refills: 0 | Status: SHIPPED | OUTPATIENT
Start: 2021-09-17 | End: 2023-08-03

## 2021-09-17 RX ORDER — GABAPENTIN 100 MG/1
100 CAPSULE ORAL
Qty: 30 CAPSULE | Refills: 0 | Status: SHIPPED | OUTPATIENT
Start: 2021-09-17 | End: 2023-08-03

## 2021-09-17 RX ORDER — ACETAMINOPHEN 325 MG/1
650 TABLET ORAL EVERY 4 HOURS PRN
Qty: 40 TABLET | Refills: 0 | Status: SHIPPED | OUTPATIENT
Start: 2021-09-17 | End: 2023-08-03

## 2021-09-17 RX ORDER — FAMOTIDINE 20 MG/1
20 TABLET, FILM COATED ORAL 2 TIMES DAILY
Qty: 28 TABLET | Refills: 0 | Status: SHIPPED | OUTPATIENT
Start: 2021-09-17 | End: 2021-10-01

## 2021-09-17 RX ORDER — SIMETHICONE 80 MG
160 TABLET,CHEWABLE ORAL ONCE
Status: COMPLETED | OUTPATIENT
Start: 2021-09-17 | End: 2021-09-17

## 2021-09-17 RX ORDER — POLYETHYLENE GLYCOL 3350 17 G/17G
17 POWDER, FOR SOLUTION ORAL DAILY
Qty: 510 G | Refills: 0 | Status: SHIPPED | OUTPATIENT
Start: 2021-09-17 | End: 2023-08-03

## 2021-09-17 RX ORDER — ASPIRIN 325 MG
325 TABLET, DELAYED RELEASE (ENTERIC COATED) ORAL DAILY
Qty: 30 TABLET | Refills: 0 | Status: SHIPPED | OUTPATIENT
Start: 2021-09-18 | End: 2022-06-03 | Stop reason: DRUGHIGH

## 2021-09-17 RX ORDER — METOPROLOL SUCCINATE 50 MG/1
50 TABLET, EXTENDED RELEASE ORAL 2 TIMES DAILY
Qty: 60 TABLET | Refills: 0 | Status: SHIPPED | OUTPATIENT
Start: 2021-09-17 | End: 2022-06-03

## 2021-09-17 RX ORDER — GUAIFENESIN 600 MG/1
600 TABLET, EXTENDED RELEASE ORAL 2 TIMES DAILY
Qty: 28 TABLET | Refills: 0 | Status: SHIPPED | OUTPATIENT
Start: 2021-09-17 | End: 2021-10-01

## 2021-09-17 RX ORDER — AMOXICILLIN 250 MG
1 CAPSULE ORAL 2 TIMES DAILY
Qty: 20 TABLET | Refills: 0 | Status: SHIPPED | OUTPATIENT
Start: 2021-09-17 | End: 2023-08-03

## 2021-09-17 RX ORDER — AMIODARONE HYDROCHLORIDE 400 MG/1
400 TABLET ORAL 2 TIMES DAILY
Qty: 4 TABLET | Refills: 0 | Status: SHIPPED | OUTPATIENT
Start: 2021-09-17 | End: 2022-06-03

## 2021-09-17 RX ORDER — ATORVASTATIN CALCIUM 40 MG/1
40 TABLET, FILM COATED ORAL EVERY EVENING
Qty: 60 TABLET | Refills: 3 | Status: SHIPPED | OUTPATIENT
Start: 2021-09-17 | End: 2023-08-03

## 2021-09-17 RX ADMIN — SIMETHICONE 160 MG: 80 TABLET, CHEWABLE ORAL at 11:22

## 2021-09-17 RX ADMIN — ASPIRIN 325 MG: 325 TABLET, COATED ORAL at 08:29

## 2021-09-17 RX ADMIN — GUAIFENESIN 600 MG: 600 TABLET ORAL at 08:29

## 2021-09-17 RX ADMIN — LEVALBUTEROL HYDROCHLORIDE 1.25 MG: 1.25 SOLUTION, CONCENTRATE RESPIRATORY (INHALATION) at 07:57

## 2021-09-17 RX ADMIN — HEPARIN SODIUM 5000 UNITS: 5000 INJECTION INTRAVENOUS; SUBCUTANEOUS at 05:30

## 2021-09-17 RX ADMIN — ACETAMINOPHEN 650 MG: 325 TABLET, FILM COATED ORAL at 01:50

## 2021-09-17 RX ADMIN — METOPROLOL SUCCINATE 50 MG: 50 TABLET, EXTENDED RELEASE ORAL at 08:29

## 2021-09-17 RX ADMIN — PANTOPRAZOLE SODIUM 40 MG: 40 TABLET, DELAYED RELEASE ORAL at 08:29

## 2021-09-17 RX ADMIN — AMIODARONE HYDROCHLORIDE 400 MG: 200 TABLET ORAL at 08:29

## 2021-09-17 RX ADMIN — METHOCARBAMOL 500 MG: 500 TABLET ORAL at 01:50

## 2021-09-17 ASSESSMENT — ACTIVITIES OF DAILY LIVING (ADL)
ADLS_ACUITY_SCORE: 9

## 2021-09-17 ASSESSMENT — MIFFLIN-ST. JEOR: SCORE: 1748.13

## 2021-09-17 NOTE — PROGRESS NOTES
Meet with pt and daughter, went over stop light tool and discharge instructions. All questions answered and pt and daughter verbalized understanding. Numbers given for care coordinator if pt/family has questions.

## 2021-09-17 NOTE — PLAN OF CARE
Occupational Therapy Discharge Summary    Reason for therapy discharge:    Discharged to home with outpatient therapy.    Progress towards therapy goal(s). See goals on Care Plan in [facility] electronic health record for goal details.  Goals partially met.  Barriers to achieving goals:   discharge from facility.    Therapy recommendation(s):    Continued therapy is recommended.  Rationale/Recommendations:  Assist in taxing IADLs including home management tasks, laundry, transportation etc. OP CR for monitored and progressive physical activity and education. .

## 2021-09-17 NOTE — DISCHARGE SUMMARY
"Discharge Summary    Dragan Prasad MRN# [id number]   YOB: 1935 Age: 85 year old     Date of Admission:  9/7/2021  Date of Discharge:  9/17/2021  Admitting Physician:  Loretta Rosario MD  Discharge Physician:  Zachary Pelayo, *  Discharging Service:  Cardiovascular and Thoracic Surgery     Home clinic: [place]   Primary Provider: Altagracia Patel          Admission Diagnoses:   Abdominal aortic aneurysm (AAA) without rupture (H) [I71.4]  Encounter for pre-operative cardiovascular clearance [Z01.810]  Abnormal cardiovascular stress test [R94.39]  Unstable angina pectoris due to coronary arteriosclerosis (H) [I25.110]          Discharge Diagnosis:   Patient Active Problem List   Diagnosis     Elevated blood pressure reading without diagnosis of hypertension     Family history of other cardiovascular diseases     Thoracic or lumbosacral neuritis or radiculitis, unspecified     Abdominal pain, right upper quadrant     Abdominal aortic aneurysm (H)     Benign neoplasm of skin     CAD (coronary artery disease)     CARDIOVASCULAR SCREENING; LDL GOAL LESS THAN 100     Encounter for pre-operative cardiovascular clearance     Abnormal cardiovascular stress test     Unstable angina pectoris due to coronary arteriosclerosis (H)     Benign essential hypertension     S/P CABG (coronary artery bypass graft)     Fluid overload     Transient hyperglycemia post procedure                Discharge Disposition:   Discharged to home           Condition on Discharge:   Discharge condition: Stable   Discharge vitals: Blood pressure (!) 142/85, pulse 66, temperature 98  F (36.7  C), temperature source Oral, resp. rate 16, height 1.803 m (5' 11\"), weight 103.6 kg (228 lb 6.3 oz), SpO2 95 %.     Code status on discharge: Full Code           Procedures:   On 9/10/21 Mr. Prasad successfully underwent Coronary artery bypass grafting x 3. Left internal mammary artery to left anterior descending artery, " Reversed saphenous vein graft to distal RCA, Reversed saphenous vein graft to obtuse marginal artery, Endoscopic vein harvest left lower extremity, Transesophageal echocardiogram by Dr. Pelayo.          Medications Prior to Admission:     Facility-Administered Medications Prior to Admission   Medication Dose Route Frequency Provider Last Rate Last Admin     nitroGLYcerin (NITROSTAT) sublingual tablet 0.4 mg  0.4 mg Sublingual Q5 Min PRN Naye Sinclair MD         Medications Prior to Admission   Medication Sig Dispense Refill Last Dose     [DISCONTINUED] ASPIRIN 325 MG PO TBEC Takes once in awhile  3 9/7/2021 at 0600     [DISCONTINUED] metoprolol succinate ER (TOPROL-XL) 25 MG 24 hr tablet Take 1 tablet (25 mg) by mouth daily 60 tablet 3 9/7/2021 at 0600             Discharge Medications:     Current Discharge Medication List      START taking these medications    Details   acetaminophen (TYLENOL) 325 MG tablet Take 2 tablets (650 mg) by mouth every 4 hours as needed for mild pain  Qty: 40 tablet, Refills: 0    Associated Diagnoses: S/P CABG (coronary artery bypass graft)      !! amiodarone (PACERONE) 200 MG tablet Take 1 tablet (200 mg) by mouth daily  Qty: 30 tablet, Refills: 0    Associated Diagnoses: S/P CABG (coronary artery bypass graft)      !! amiodarone (PACERONE) 400 MG tablet Take 1 tablet (400 mg) by mouth 2 times daily for 2 days  Qty: 4 tablet, Refills: 0    Associated Diagnoses: S/P CABG (coronary artery bypass graft)      atorvastatin (LIPITOR) 40 MG tablet Take 1 tablet (40 mg) by mouth every evening  Qty: 60 tablet, Refills: 3    Associated Diagnoses: S/P CABG (coronary artery bypass graft)      famotidine (PEPCID) 20 MG tablet Take 1 tablet (20 mg) by mouth 2 times daily for 14 days  Qty: 28 tablet, Refills: 0    Associated Diagnoses: S/P CABG (coronary artery bypass graft)      gabapentin (NEURONTIN) 100 MG capsule Take 1 capsule (100 mg) by mouth nightly as needed (nerve pain)  Qty: 30  capsule, Refills: 0    Associated Diagnoses: S/P CABG (coronary artery bypass graft)      guaiFENesin (MUCINEX) 600 MG 12 hr tablet Take 1 tablet (600 mg) by mouth 2 times daily for 14 days  Qty: 28 tablet, Refills: 0    Associated Diagnoses: S/P CABG (coronary artery bypass graft)      methocarbamol (ROBAXIN) 500 MG tablet Take 1 tablet (500 mg) by mouth every 6 hours as needed for muscle spasms  Qty: 40 tablet, Refills: 0    Associated Diagnoses: S/P CABG (coronary artery bypass graft)      oxyCODONE (ROXICODONE) 5 MG tablet Take 1 tablet (5 mg) by mouth every 6 hours as needed for moderate to severe pain  Qty: 30 tablet, Refills: 0    Associated Diagnoses: S/P CABG (coronary artery bypass graft)      polyethylene glycol (MIRALAX) 17 GM/Dose powder Take 17 g by mouth daily  Qty: 510 g, Refills: 0    Associated Diagnoses: S/P CABG (coronary artery bypass graft)      senna-docusate (SENOKOT-S/PERICOLACE) 8.6-50 MG tablet Take 1 tablet by mouth 2 times daily  Qty: 20 tablet, Refills: 0    Associated Diagnoses: S/P CABG (coronary artery bypass graft)       !! - Potential duplicate medications found. Please discuss with provider.      CONTINUE these medications which have CHANGED    Details   aspirin (ASA) 325 MG EC tablet Take 1 tablet (325 mg) by mouth daily  Qty: 30 tablet, Refills: 0    Associated Diagnoses: S/P CABG (coronary artery bypass graft)      metoprolol succinate ER (TOPROL-XL) 50 MG 24 hr tablet Take 1 tablet (50 mg) by mouth 2 times daily  Qty: 60 tablet, Refills: 0    Associated Diagnoses: S/P CABG (coronary artery bypass graft)                   Consultations:   Nutrition, Intensivist, Cardiology, thoracic              Brief History of Illness:   Mr. Dragan Prasad is a 85 year old male admitted with abdominal aortic aneurysm who was found to have severe multi vessel coronary artery disease.  We were asked to evaluate for surgical revascularization.  Risks and benefits of the operation were explained  to the patient and their family including, but not limited to, bleeding, infection, stroke and even death.  They understood these risks and agreed to proceed electively.          Hospital Course:   On 9/10/21 Mr. Prasad successfully underwent Coronary artery bypass grafting x 3. Left internal mammary artery to left anterior descending artery, Reversed saphenous vein graft to distal RCA, Reversed saphenous vein graft to obtuse marginal artery, Endoscopic vein harvest left lower extremity, Transesophageal echocardiogram by Dr. Pelayo.  Was extubated within 24 hours of surgery. Once he was weaned off hemodynamic stabilizing gtt's, transferred to the surgical floor.   Had some transient hyperglycemia treated with an insulin gtt, transitioned to sliding scale insulin and has no need at discharge.  He has   Had some fluid overload treated with diuretic medication. At discharge he is 3 kg above his pre-op weight and is not sent with diuretics as he is auto diuresing and has slt bump in his creatinine. He has CKD baseline with Crea: 1.3 and had a mild GHASSAN but is stable at discharge (Crea 1.36).   Heart rhythm-went into a.fib was placed on amiodarone and converted to NSR. He remained in NSR for duration of hospitalization. He progressed well with cardiac rehab. He is discharged to home on pod# 7 with the help of their family.              Significant Results:   Lab Results   Component Value Date    WBC 13.4 09/17/2021    WBC 8.8 03/09/2011     Lab Results   Component Value Date    RBC 3.56 09/17/2021    RBC 4.72 03/09/2011     Lab Results   Component Value Date    HGB 10.8 09/17/2021    HGB 14.5 03/09/2011     Lab Results   Component Value Date    HCT 34.5 09/17/2021    HCT 43.5 03/09/2011     No components found for: MCT  Lab Results   Component Value Date    MCV 97 09/17/2021    MCV 92 03/09/2011     Lab Results   Component Value Date    MCH 30.3 09/17/2021    MCH 30.7 03/09/2011     Lab Results   Component Value Date     MCHC 31.3 09/17/2021    MCHC 33.3 03/09/2011     Lab Results   Component Value Date    RDW 13.3 09/17/2021    RDW 13.2 03/09/2011     Lab Results   Component Value Date     09/17/2021     03/09/2011       Last Basic Metabolic Panel:  Lab Results   Component Value Date     09/17/2021     06/06/2017      Lab Results   Component Value Date    POTASSIUM 4.0 09/17/2021    POTASSIUM 4.4 06/06/2017     Lab Results   Component Value Date    CHLORIDE 109 09/17/2021    CHLORIDE 108 06/06/2017     Lab Results   Component Value Date    ARELI 8.4 09/17/2021    ARELI 9.6 06/06/2017     Lab Results   Component Value Date    CO2 28 09/17/2021    CO2 23 06/06/2017     Lab Results   Component Value Date    BUN 20 09/17/2021    BUN 17 06/06/2017     Lab Results   Component Value Date    CR 1.35 09/17/2021    CR 1.03 06/06/2017     Lab Results   Component Value Date     09/17/2021    GLC 89 06/06/2017                  Pending Results:   None           Discharge Instructions and Follow-Up:   Discharge diet: Regular   Discharge activity: Daily weights: Call if weight gain 2-3 lbs over 24 hours or if greater than 5 lbs in 1 week.  No lifting more than 10 lbs for 8-12 weeks.  No driving for 1 month.  Call for pain medication refill.  Call for temperature greater than 101.0  Daily shower with antibacterial soap.   Discharge follow-up: *Follow up primary care provider in 7-10 days after discharge in order to review your medication, vital signs, obtain any necessary lab work your doctor may want, and to update them on your hospitalization and medical issues.   *Follow up with Mery/Corinna/Kwasi Ace with Dr Miller/Pierce/Coty, heart surgeon, at [facility] at [facility] [address] on 9/29/21 at 3:00 PM. If any questions or concerns call [phone].  You will see us once at this visit and then if everything is going well you will not need to see us again.  You will follow long term with  your cardiologist.   *Follow up with Dr Sinclair, cardiologist, on 11/24/21 at 11:00 am in [facility]. This is who you will follow with long term about your heart issues. [phone].    Outpatient therapy: Cardiac rehab   Home Care agency: None    Supplies and equipment: None   Lines and drains: None    Wound care: Wash incision daily with antibacterial soap   Other instructions: None

## 2021-09-17 NOTE — PROGRESS NOTES
"BRIEF NUTRITION FOLLOW-UP        CURRENT DIET AND INTAKE:  Diet:  Regular + Ensure between meals              Chart reviewed  Spoke with pt this morning  Reports enjoying his omelet for breakfast (also had English muleilain and aurelia)  Notes that he is taking the Ensure - \"wasn't able to take them at first with my poor appetite, so have saved some in my room\"  Has been eating %    ANTHROPOMETRICS:  Vitals:    09/07/21 0641 09/08/21 0000 09/09/21 0400 09/10/21 0610   Weight: 100.4 kg (221 lb 6.4 oz) 96.8 kg (213 lb 6.5 oz) 96.8 kg (213 lb 6.5 oz) 97.1 kg (214 lb)    09/11/21 0630 09/12/21 0500 09/13/21 0341 09/14/21 0500   Weight: 102.1 kg (225 lb 1.4 oz) 101.7 kg (224 lb 3.3 oz) 103 kg (227 lb 1.2 oz) 102.6 kg (226 lb 3.1 oz)    09/15/21 0622 09/16/21 0530 09/17/21 0532   Weight: 102.1 kg (225 lb 1.4 oz) 102.1 kg (225 lb 1.4 oz) 103.6 kg (228 lb 6.3 oz)         LABS:  Labs noted    MALNUTRITION:  Patient does not meet two of the criteria necessary for diagnosing malnutrition.       NUTRITION INTERVENTION:  Nutrition Diagnosis:  No nutrition diagnosis at this time.    Implementation:  Will continue with Ensure between meals for added protein    FOLLOW UP/MONITORING:   Will re-evaluate in 7 - 10 days, or sooner, if re-consulted.          "

## 2021-09-17 NOTE — PLAN OF CARE
A&O x 4, AVSS, Sinus rhythm. 93% on room air. Pt has a congested cough with occasional exp wheezes. IS encouraged. Nebs given and mucinex. Incision CDI. 3+ bilateral LE' S edema. Ace wrap in place. Tylenol for pain. Up with SBA.

## 2021-09-17 NOTE — PROGRESS NOTES
CV Surgery    S: Seen sitting up in chair, drinking coffee, breathing better-less wheezing episodes, tolerating diet, up walking, ready to discharge home, slt inc in WBC-CXR this am    O: B/P: 159/85, T: 98.4, P: 79, R: 16  General: NAD  Heart: s1/s2, no m/r/g  Lungs: course  Abd: s/nt/nd  Sternum: cdi  Extremities: 1-2+ LE edema    Lab Results   Component Value Date    WBC 13.4 09/17/2021    WBC 8.8 03/09/2011     Lab Results   Component Value Date    RBC 3.56 09/17/2021    RBC 4.72 03/09/2011     Lab Results   Component Value Date    HGB 10.8 09/17/2021    HGB 14.5 03/09/2011     Lab Results   Component Value Date    HCT 34.5 09/17/2021    HCT 43.5 03/09/2011     No components found for: MCT  Lab Results   Component Value Date    MCV 97 09/17/2021    MCV 92 03/09/2011     Lab Results   Component Value Date    MCH 30.3 09/17/2021    MCH 30.7 03/09/2011     Lab Results   Component Value Date    MCHC 31.3 09/17/2021    MCHC 33.3 03/09/2011     Lab Results   Component Value Date    RDW 13.3 09/17/2021    RDW 13.2 03/09/2011     Lab Results   Component Value Date     09/17/2021     03/09/2011       Last Basic Metabolic Panel:  Lab Results   Component Value Date     09/17/2021     06/06/2017      Lab Results   Component Value Date    POTASSIUM 4.0 09/17/2021    POTASSIUM 4.4 06/06/2017     Lab Results   Component Value Date    CHLORIDE 109 09/17/2021    CHLORIDE 108 06/06/2017     Lab Results   Component Value Date    ARELI 8.4 09/17/2021    ARELI 9.6 06/06/2017     Lab Results   Component Value Date    CO2 28 09/17/2021    CO2 23 06/06/2017     Lab Results   Component Value Date    BUN 20 09/17/2021    BUN 17 06/06/2017     Lab Results   Component Value Date    CR 1.35 09/17/2021    CR 1.03 06/06/2017     Lab Results   Component Value Date     09/17/2021    GLC 89 06/06/2017       A/P: POD#7 s/p Coronary artery bypass grafting x 3, Left internal mammary artery to left anterior descending  artery, Reversed saphenous vein graft to distal RCA, Reversed saphenous vein graft to obtuse marginal artery, Endoscopic vein harvest left lower extremity, Transesophageal echocardiogram by Dr. Zachary Pelayo on 9/10/21    Seen and discussed with Dr. Pierce Lezama PA-C  Pager [phone]

## 2021-09-18 DIAGNOSIS — Z71.89 OTHER SPECIFIED COUNSELING: ICD-10-CM

## 2021-09-19 ENCOUNTER — PATIENT OUTREACH (OUTPATIENT)
Dept: CARE COORDINATION | Facility: CLINIC | Age: 86
End: 2021-09-19

## 2021-09-19 NOTE — PROGRESS NOTES
Clinic Care Coordination Contact  [facility]: Post-Discharge Note  SITUATION                                                      Admission:    Admission Date: 09/07/21   Reason for Admission: Abdominal aortic aneurysm (AAA) without rupture, Encounter for pre-operative cardiovascular clearance  Discharge:   Discharge Date: 09/17/21  Discharge Diagnosis: Abdominal aortic aneurysm (AAA) without rupture, Encounter for pre-operative cardiovascular clearance    BACKGROUND                                                      On 9/10/21 Mr. Prasad successfully underwent Coronary artery bypass grafting x 3. Left internal mammary artery to left anterior descending artery, Reversed saphenous vein graft to distal RCA, Reversed saphenous vein graft to obtuse marginal artery, Endoscopic vein harvest left lower extremity, Transesophageal echocardiogram by Dr. Pelayo.  Was extubated within 24 hours of surgery.   Had some transient hyperglycemia treated with an insulin gtt, transitioned to   sliding scale insulin and has  need at discharge.  Had some fluid overload treated with diuretic medication. At discharge he/she   is  above their pre-op weight and is sent with  days of diuretics.   Heart rhythm remained stable. He/she progressed well with cardiac   rehab. They are discharged  on pod#  with the help of their family.     ASSESSMENT           Discharge Assessment  How are you doing now that you are home?: ok  How are your symptoms? (Red Flag symptoms escalate to triage hotline per guidelines): Improved  Do you feel your condition is stable enough to be safe at home until your provider visit?: Yes  Does the patient have their discharge instructions? : Yes  Does the patient have questions regarding their discharge instructions? : No  Were you started on any new medications or were there changes to any of your previous medications? : Yes  Does the patient have all of their medications?: Yes  Do you have questions regarding  any of your medications? : No  Discharge follow-up appointment scheduled within 14 calendar days? : Yes  Discharge Follow Up Appointment Date: 09/28/21        PLAN                                                      Outpatient Plan:  *Follow up primary care provider in 7-10 days after discharge in order to review your medication, vital signs, obtain any necessary lab work your doctor may want, and to update them on your hospitalization and medical issues.   *Follow up with Mery/Corinna/Kwasi Ace with Dr Miller/Pierce/Coty, heart surgeon, at [facility] at [facility] [address] on 9/29/21 at 3:00 PM. If any questions or concerns call [phone].  You will see us once at this visit and then if everything is going well you will not need to see us again.  You will follow long term with your cardiologist.   *Follow up with Dr Sinclair, cardiologist, on 11/24/21 at 11:00 am in [facility]. This is who you will follow with long term about your heart issues. [phone].     Future Appointments   Date Time Provider Department Center   9/28/2021  1:00 PM [place] [facility]   9/29/2021  3:00 PM [facility]   11/24/2021 11:00 AM Naye Sinclair MD [facility]         For any urgent concerns, please contact our 24 hour nurse triage line: [phone] ([phone])         Jane NINO Community Health Worker  Clinic Care Coordination  [facility]  Phone: [phone]

## 2021-09-20 PROBLEM — E87.70 FLUID OVERLOAD: Status: ACTIVE | Noted: 2021-09-20

## 2021-09-20 PROBLEM — Z95.1 S/P CABG (CORONARY ARTERY BYPASS GRAFT): Status: ACTIVE | Noted: 2021-09-20

## 2021-09-20 PROBLEM — R73.9 TRANSIENT HYPERGLYCEMIA POST PROCEDURE: Status: ACTIVE | Noted: 2021-09-20

## 2021-09-21 ENCOUNTER — TELEPHONE (OUTPATIENT)
Dept: OTHER | Facility: CLINIC | Age: 86
End: 2021-09-21

## 2021-09-21 NOTE — TELEPHONE ENCOUNTER
48 hour post op call    ACTIVITY  How are you doing with activity? He is doing well. Does get short of breath easily.   Are you continuing to use your IS 3-4 times a day and do you have any shortness of breath. Yes and he is slowly improving.     Are you weighing yourself daily and has it been stable?. He is loosing weight and his urination has picked up the last few days. Still 7 lbs above pre op weight. We shon continue to monitor his weight and evaluate if gains weight.     PAIN  How is your pain, what are you doing for your pain? It is manageable with tylenol and Robaxin. Does not like the Oxycodone. He is having trouble sleeping. Recommended Tyl pm or Melatonin.     Are you still doing sternal precautions? Yes, sternal precautions reinforced.    Do you hear any clicking when you are moving or taking a deep breath? No     INCISION:   It looks pretty good.     ASSISTANCE  Do you have someone at home to help you with your daily activities and transportation needs? His daughter's are taking turns staying with him.       MEDICATIONS  I would like to review your discharge medications and answer any questions you may have.   reviewed in detail.      Are you on a blood thinner/Coumadin  No, He did have post op A-Fib that converted with Amiodarone. Instructed if re occurs to call for possible need of anticoagulation initiation. He has an apple watch and HR has been in the 60's. /78    Who is managing your Coumadin dosing/INR? Na     FOLLOW UP       Scheduled for cardiac rehab? 9/24   Scheduled to see our PA or Surgeon? 9/29  Scheduled to see your cardiologist ? Dr. Sinclair 11/24  Scheduled to see ANGEL/Core requested EP apt  in 4-6 weeks. .   Scheduled to see your primary care physician? Does not have one. Recommendations for [place] or [facility] given.   Do you have our contact information? Yes

## 2021-09-24 ENCOUNTER — HOSPITAL ENCOUNTER (OUTPATIENT)
Dept: CARDIAC REHAB | Facility: CLINIC | Age: 86
End: 2021-09-24
Attending: SURGERY
Payer: MEDICARE

## 2021-09-24 DIAGNOSIS — Z95.1 S/P CABG (CORONARY ARTERY BYPASS GRAFT): Primary | ICD-10-CM

## 2021-09-24 DIAGNOSIS — Z95.1 S/P CABG (CORONARY ARTERY BYPASS GRAFT): ICD-10-CM

## 2021-09-24 PROCEDURE — 93797 PHYS/QHP OP CAR RHAB WO ECG: CPT | Mod: XU

## 2021-09-24 PROCEDURE — 93798 PHYS/QHP OP CAR RHAB W/ECG: CPT

## 2021-09-24 RX ORDER — FUROSEMIDE 20 MG
20 TABLET ORAL DAILY
Qty: 3 TABLET | Refills: 0 | Status: SHIPPED | OUTPATIENT
Start: 2021-09-24 | End: 2022-06-03

## 2021-09-24 NOTE — PROGRESS NOTES
CVTS    Received call from cardiac rehab regarding patient ongoing lower extremity edema with some shortness of breath.  Discussed with Dr. Pelayo.   Will plan for gentle diuresis with 20mg lasix for 3 days and will recheck a BMP prior to our clinic appointment next week. Will need to be cautious with recent GHASSAN on CKD.     Corinna Slade PA-C  CV surgery

## 2021-09-29 ENCOUNTER — HOSPITAL ENCOUNTER (OUTPATIENT)
Dept: CARDIAC REHAB | Facility: CLINIC | Age: 86
End: 2021-09-29
Attending: SURGERY
Payer: MEDICARE

## 2021-09-29 ENCOUNTER — OFFICE VISIT (OUTPATIENT)
Dept: CARDIOLOGY | Facility: CLINIC | Age: 86
End: 2021-09-29
Payer: MEDICARE

## 2021-09-29 ENCOUNTER — LAB (OUTPATIENT)
Dept: LAB | Facility: CLINIC | Age: 86
End: 2021-09-29
Attending: PHYSICIAN ASSISTANT
Payer: MEDICARE

## 2021-09-29 ENCOUNTER — HOSPITAL ENCOUNTER (OUTPATIENT)
Dept: GENERAL RADIOLOGY | Facility: CLINIC | Age: 86
End: 2021-09-29
Attending: PHYSICIAN ASSISTANT
Payer: MEDICARE

## 2021-09-29 VITALS
HEART RATE: 52 BPM | DIASTOLIC BLOOD PRESSURE: 78 MMHG | BODY MASS INDEX: 30.38 KG/M2 | HEIGHT: 71 IN | OXYGEN SATURATION: 94 % | SYSTOLIC BLOOD PRESSURE: 136 MMHG | WEIGHT: 217 LBS

## 2021-09-29 DIAGNOSIS — Z95.1 S/P CABG X 3: ICD-10-CM

## 2021-09-29 DIAGNOSIS — Z95.1 S/P CABG X 3: Primary | ICD-10-CM

## 2021-09-29 DIAGNOSIS — Z95.1 S/P CABG (CORONARY ARTERY BYPASS GRAFT): ICD-10-CM

## 2021-09-29 LAB
ANION GAP SERPL CALCULATED.3IONS-SCNC: 7 MMOL/L (ref 3–14)
BUN SERPL-MCNC: 20 MG/DL (ref 7–30)
CALCIUM SERPL-MCNC: 8.4 MG/DL (ref 8.5–10.1)
CHLORIDE BLD-SCNC: 111 MMOL/L (ref 94–109)
CO2 SERPL-SCNC: 22 MMOL/L (ref 20–32)
CREAT SERPL-MCNC: 1.56 MG/DL (ref 0.66–1.25)
GFR SERPL CREATININE-BSD FRML MDRD: 40 ML/MIN/1.73M2
GLUCOSE BLD-MCNC: 105 MG/DL (ref 70–99)
POTASSIUM BLD-SCNC: 4.3 MMOL/L (ref 3.4–5.3)
SODIUM SERPL-SCNC: 140 MMOL/L (ref 133–144)

## 2021-09-29 PROCEDURE — 93798 PHYS/QHP OP CAR RHAB W/ECG: CPT

## 2021-09-29 PROCEDURE — 80048 BASIC METABOLIC PNL TOTAL CA: CPT

## 2021-09-29 PROCEDURE — 36415 COLL VENOUS BLD VENIPUNCTURE: CPT

## 2021-09-29 PROCEDURE — 99024 POSTOP FOLLOW-UP VISIT: CPT | Performed by: PHYSICIAN ASSISTANT

## 2021-09-29 PROCEDURE — 71046 X-RAY EXAM CHEST 2 VIEWS: CPT

## 2021-09-29 ASSESSMENT — MIFFLIN-ST. JEOR: SCORE: 1714.58

## 2021-09-29 NOTE — PROGRESS NOTES
"Pt comes to the clinic for routine f/u of s/p CABG x 3 on 9/10/21 by Dr Pelayo. He was initially admitted to the hospital on 9/7 and discharged to home on 9/17/21. His hospital course was complicated by an episode of afib and was discharged with a tapering dose of amiodarone. The remainder of his hospital course was unremarkable.     Pt states that he is not having any pain. Occasionally using tylenol. Complains of some sob with exertion. Continues to use his IS and is getting it up to about 2000 ml. Weight is continuing to decrease. Does continue to have some swelling in his lower extremities. Appetite has been ok. States that things don't taste as well now. Denies any nausea. BMs are ok. Not taking any stool softeners. Denies any popping/clicking in his breast bone. Denies any F/C/NS. Denies any lightheadedness or dizziness. Started cardiac rehab last Friday. Walked on the treadmill for a 1/2 mile today. States that he has not been sleeping. Has tried melatonin with no success. States that he is not a back sleeper. D/w pt that he can sleep on his side and to use pillows between his knees and to spoon one.    Up in chair, comfortable, -O2  /78 (BP Location: Left arm, Cuff Size: Adult Regular)   Pulse 52   Ht 1.803 m (5' 11\")   Wt 98.4 kg (217 lb)   SpO2 94%   BMI 30.27 kg/m    CV - reg rate, +edema LE  Pulm - CTA  Derm - sternal incision D/I   L LE incisions D/I    A/P:  Pt is progressing well. Will get a CXR to ensure there is no significant pleural effusion. Pt continues on lasix 20 mg daily. Pt is to continue to work with IS. Reviewed surgical restrictions. All questions/concerns were addressed. Meds reviewed. Pt continues on aspirin 324 mg, atorvastatin 40 and metoprolol ER 50 mg bid. No need for further f/u with surgery unless questions/concerns arise. Pt is to continue to work with cardiology and establish a PCP. Pt/daughter agreeable to plan of care. Continue to monitor.   "

## 2021-09-29 NOTE — PATIENT INSTRUCTIONS
No lifting over 10 lbs x 8 weeks after surgery. After the 8 weeks gradually increase what you are lifting.  No driving for 4 weeks after surgery.  Participate in cardiac rehab phase II.   If you have any questions/concerns for your surgeon please call [phone].  We will get a chest xray and call you with results on Thursday.  Continue to use incentive spirometer 6 times a day.  Continue monitoring weight daily.

## 2021-09-30 ENCOUNTER — DOCUMENTATION ONLY (OUTPATIENT)
Dept: OTHER | Facility: CLINIC | Age: 86
End: 2021-09-30

## 2021-09-30 NOTE — PROGRESS NOTES
CXR results called to daughter. No abnormalities. Weight down, short of breath with exertion. Limited mobility due to bad knees. Encouraged to continue with rehab and his recumbent bike.

## 2021-10-01 ENCOUNTER — HOSPITAL ENCOUNTER (OUTPATIENT)
Dept: CARDIAC REHAB | Facility: CLINIC | Age: 86
End: 2021-10-01
Attending: SURGERY
Payer: MEDICARE

## 2021-10-01 PROCEDURE — 93798 PHYS/QHP OP CAR RHAB W/ECG: CPT

## 2021-10-04 ENCOUNTER — HOSPITAL ENCOUNTER (OUTPATIENT)
Dept: CARDIAC REHAB | Facility: CLINIC | Age: 86
End: 2021-10-04
Attending: SURGERY
Payer: MEDICARE

## 2021-10-04 PROCEDURE — 93798 PHYS/QHP OP CAR RHAB W/ECG: CPT

## 2021-10-06 ENCOUNTER — HOSPITAL ENCOUNTER (OUTPATIENT)
Dept: CARDIAC REHAB | Facility: CLINIC | Age: 86
End: 2021-10-06
Attending: SURGERY
Payer: MEDICARE

## 2021-10-06 PROCEDURE — 93798 PHYS/QHP OP CAR RHAB W/ECG: CPT

## 2021-10-08 ENCOUNTER — HOSPITAL ENCOUNTER (OUTPATIENT)
Dept: CARDIAC REHAB | Facility: CLINIC | Age: 86
End: 2021-10-08
Attending: SURGERY
Payer: MEDICARE

## 2021-10-08 PROCEDURE — 93798 PHYS/QHP OP CAR RHAB W/ECG: CPT

## 2021-10-11 ENCOUNTER — HOSPITAL ENCOUNTER (OUTPATIENT)
Dept: CARDIAC REHAB | Facility: CLINIC | Age: 86
End: 2021-10-11
Attending: SURGERY
Payer: MEDICARE

## 2021-10-11 PROCEDURE — 93798 PHYS/QHP OP CAR RHAB W/ECG: CPT

## 2021-10-13 ENCOUNTER — HOSPITAL ENCOUNTER (OUTPATIENT)
Dept: CARDIAC REHAB | Facility: CLINIC | Age: 86
End: 2021-10-13
Attending: SURGERY
Payer: MEDICARE

## 2021-10-13 PROCEDURE — 93798 PHYS/QHP OP CAR RHAB W/ECG: CPT

## 2021-10-15 ENCOUNTER — HOSPITAL ENCOUNTER (OUTPATIENT)
Dept: CARDIAC REHAB | Facility: CLINIC | Age: 86
End: 2021-10-15
Attending: SURGERY
Payer: MEDICARE

## 2021-10-15 PROCEDURE — 93798 PHYS/QHP OP CAR RHAB W/ECG: CPT

## 2021-10-18 ENCOUNTER — HOSPITAL ENCOUNTER (OUTPATIENT)
Dept: CARDIAC REHAB | Facility: CLINIC | Age: 86
End: 2021-10-18
Attending: SURGERY
Payer: MEDICARE

## 2021-10-18 PROCEDURE — 93798 PHYS/QHP OP CAR RHAB W/ECG: CPT

## 2021-10-20 ENCOUNTER — HOSPITAL ENCOUNTER (OUTPATIENT)
Dept: CARDIAC REHAB | Facility: CLINIC | Age: 86
End: 2021-10-20
Attending: SURGERY
Payer: MEDICARE

## 2021-10-20 PROCEDURE — 93798 PHYS/QHP OP CAR RHAB W/ECG: CPT | Performed by: CLINICAL EXERCISE PHYSIOLOGIST

## 2021-10-22 ENCOUNTER — HOSPITAL ENCOUNTER (OUTPATIENT)
Dept: CARDIAC REHAB | Facility: CLINIC | Age: 86
End: 2021-10-22
Attending: SURGERY
Payer: MEDICARE

## 2021-10-22 PROCEDURE — 93798 PHYS/QHP OP CAR RHAB W/ECG: CPT

## 2022-05-21 ENCOUNTER — HOSPITAL ENCOUNTER (EMERGENCY)
Facility: CLINIC | Age: 87
Discharge: HOME | End: 2022-05-21
Attending: STUDENT IN AN ORGANIZED HEALTH CARE EDUCATION/TRAINING PROGRAM | Admitting: STUDENT IN AN ORGANIZED HEALTH CARE EDUCATION/TRAINING PROGRAM
Payer: MEDICARE

## 2022-05-21 ENCOUNTER — APPOINTMENT (OUTPATIENT)
Dept: CT IMAGING | Facility: CLINIC | Age: 87
End: 2022-05-21
Attending: STUDENT IN AN ORGANIZED HEALTH CARE EDUCATION/TRAINING PROGRAM
Payer: MEDICARE

## 2022-05-21 VITALS
SYSTOLIC BLOOD PRESSURE: 193 MMHG | HEIGHT: 71 IN | DIASTOLIC BLOOD PRESSURE: 114 MMHG | TEMPERATURE: 97.4 F | BODY MASS INDEX: 28 KG/M2 | WEIGHT: 200 LBS | OXYGEN SATURATION: 95 % | HEART RATE: 71 BPM | RESPIRATION RATE: 23 BRPM

## 2022-05-21 DIAGNOSIS — R07.89 INTERMITTENT LEFT-SIDED CHEST PAIN: ICD-10-CM

## 2022-05-21 LAB
ALBUMIN SERPL-MCNC: 3.3 G/DL (ref 3.4–5)
ALP SERPL-CCNC: 86 U/L (ref 40–150)
ALT SERPL W P-5'-P-CCNC: 24 U/L (ref 0–70)
ANION GAP SERPL CALCULATED.3IONS-SCNC: 5 MMOL/L (ref 3–14)
AST SERPL W P-5'-P-CCNC: 26 U/L (ref 0–45)
BASOPHILS # BLD AUTO: 0.1 10E3/UL (ref 0–0.2)
BASOPHILS NFR BLD AUTO: 1 %
BILIRUB SERPL-MCNC: 0.3 MG/DL (ref 0.2–1.3)
BUN SERPL-MCNC: 20 MG/DL (ref 7–30)
CALCIUM SERPL-MCNC: 8.7 MG/DL (ref 8.5–10.1)
CHLORIDE BLD-SCNC: 113 MMOL/L (ref 94–109)
CO2 SERPL-SCNC: 25 MMOL/L (ref 20–32)
CREAT SERPL-MCNC: 1.2 MG/DL (ref 0.66–1.25)
D DIMER PPP FEU-MCNC: 3.11 UG/ML FEU (ref 0–0.5)
EOSINOPHIL # BLD AUTO: 0.3 10E3/UL (ref 0–0.7)
EOSINOPHIL NFR BLD AUTO: 4 %
ERYTHROCYTE [DISTWIDTH] IN BLOOD BY AUTOMATED COUNT: 14 % (ref 10–15)
GFR SERPL CREATININE-BSD FRML MDRD: 59 ML/MIN/1.73M2
GLUCOSE BLD-MCNC: 138 MG/DL (ref 70–99)
HCT VFR BLD AUTO: 42.3 % (ref 40–53)
HGB BLD-MCNC: 13.6 G/DL (ref 13.3–17.7)
HOLD SPECIMEN: NORMAL
IMM GRANULOCYTES # BLD: 0.1 10E3/UL
IMM GRANULOCYTES NFR BLD: 1 %
INR PPP: 1.09 (ref 0.85–1.15)
LYMPHOCYTES # BLD AUTO: 1.9 10E3/UL (ref 0.8–5.3)
LYMPHOCYTES NFR BLD AUTO: 26 %
MCH RBC QN AUTO: 30.6 PG (ref 26.5–33)
MCHC RBC AUTO-ENTMCNC: 32.2 G/DL (ref 31.5–36.5)
MCV RBC AUTO: 95 FL (ref 78–100)
MONOCYTES # BLD AUTO: 0.9 10E3/UL (ref 0–1.3)
MONOCYTES NFR BLD AUTO: 13 %
NEUTROPHILS # BLD AUTO: 4 10E3/UL (ref 1.6–8.3)
NEUTROPHILS NFR BLD AUTO: 55 %
NRBC # BLD AUTO: 0 10E3/UL
NRBC BLD AUTO-RTO: 0 /100
PLATELET # BLD AUTO: 245 10E3/UL (ref 150–450)
POTASSIUM BLD-SCNC: 4.3 MMOL/L (ref 3.4–5.3)
PROT SERPL-MCNC: 7.2 G/DL (ref 6.8–8.8)
RBC # BLD AUTO: 4.44 10E6/UL (ref 4.4–5.9)
SODIUM SERPL-SCNC: 143 MMOL/L (ref 133–144)
TROPONIN I SERPL HS-MCNC: 19 NG/L
WBC # BLD AUTO: 7.2 10E3/UL (ref 4–11)

## 2022-05-21 PROCEDURE — G1004 CDSM NDSC: HCPCS

## 2022-05-21 PROCEDURE — 250N000011 HC RX IP 250 OP 636: Performed by: STUDENT IN AN ORGANIZED HEALTH CARE EDUCATION/TRAINING PROGRAM

## 2022-05-21 PROCEDURE — 250N000009 HC RX 250: Performed by: STUDENT IN AN ORGANIZED HEALTH CARE EDUCATION/TRAINING PROGRAM

## 2022-05-21 PROCEDURE — 80053 COMPREHEN METABOLIC PANEL: CPT | Performed by: STUDENT IN AN ORGANIZED HEALTH CARE EDUCATION/TRAINING PROGRAM

## 2022-05-21 PROCEDURE — 84484 ASSAY OF TROPONIN QUANT: CPT | Performed by: STUDENT IN AN ORGANIZED HEALTH CARE EDUCATION/TRAINING PROGRAM

## 2022-05-21 PROCEDURE — 85379 FIBRIN DEGRADATION QUANT: CPT | Performed by: STUDENT IN AN ORGANIZED HEALTH CARE EDUCATION/TRAINING PROGRAM

## 2022-05-21 PROCEDURE — 85610 PROTHROMBIN TIME: CPT | Performed by: STUDENT IN AN ORGANIZED HEALTH CARE EDUCATION/TRAINING PROGRAM

## 2022-05-21 PROCEDURE — 93010 ELECTROCARDIOGRAM REPORT: CPT | Performed by: STUDENT IN AN ORGANIZED HEALTH CARE EDUCATION/TRAINING PROGRAM

## 2022-05-21 PROCEDURE — 99285 EMERGENCY DEPT VISIT HI MDM: CPT | Mod: 25 | Performed by: STUDENT IN AN ORGANIZED HEALTH CARE EDUCATION/TRAINING PROGRAM

## 2022-05-21 PROCEDURE — 36415 COLL VENOUS BLD VENIPUNCTURE: CPT | Performed by: STUDENT IN AN ORGANIZED HEALTH CARE EDUCATION/TRAINING PROGRAM

## 2022-05-21 PROCEDURE — 85014 HEMATOCRIT: CPT | Performed by: STUDENT IN AN ORGANIZED HEALTH CARE EDUCATION/TRAINING PROGRAM

## 2022-05-21 PROCEDURE — 258N000003 HC RX IP 258 OP 636: Performed by: STUDENT IN AN ORGANIZED HEALTH CARE EDUCATION/TRAINING PROGRAM

## 2022-05-21 PROCEDURE — 71275 CT ANGIOGRAPHY CHEST: CPT | Mod: ME

## 2022-05-21 PROCEDURE — 93005 ELECTROCARDIOGRAM TRACING: CPT | Performed by: STUDENT IN AN ORGANIZED HEALTH CARE EDUCATION/TRAINING PROGRAM

## 2022-05-21 PROCEDURE — 96360 HYDRATION IV INFUSION INIT: CPT | Mod: 59 | Performed by: STUDENT IN AN ORGANIZED HEALTH CARE EDUCATION/TRAINING PROGRAM

## 2022-05-21 RX ORDER — IOPAMIDOL 755 MG/ML
84 INJECTION, SOLUTION INTRAVASCULAR ONCE
Status: COMPLETED | OUTPATIENT
Start: 2022-05-21 | End: 2022-05-21

## 2022-05-21 RX ADMIN — SODIUM CHLORIDE 100 ML: 9 INJECTION, SOLUTION INTRAVENOUS at 17:29

## 2022-05-21 RX ADMIN — SODIUM CHLORIDE 500 ML: 9 INJECTION, SOLUTION INTRAVENOUS at 17:15

## 2022-05-21 RX ADMIN — IOPAMIDOL 84 ML: 755 INJECTION, SOLUTION INTRAVENOUS at 17:29

## 2022-05-21 NOTE — ED PROVIDER NOTES
History     Chief Complaint   Patient presents with     Abdominal Pain     LUQ and left chest pain that radiates up to neck, pain started ~1515. Pain worse with deep inhalation, pt had open heart surgery in 09/2021-CABG x 3.      Chest Pain     HPI  Dragan Prasad is a 86 year old male status post CABG 9/10/2021 followed by AAA repair well in [place] 03/2022 who presents to the department today for evaluation of left-sided chest pain.  Patient explains that while walking around a local store he developed sharp left-sided pain along the inferior lateral chest region, exacerbated with deep inspiration, and seeming to radiate upwards towards the left side of his neck.  Symptoms began around 1 hour prior to arrival but have since dramatically improved since arriving to the department.  He recalls a history of similar achy neck pain over the winter while in [place] but had been evaluated without identifiable etiology.  He denies similar chest or torso discomfort in the past.  He has been without recent fever, chills, cough, shortness of breath, back pain, abdominal pain or gastrointestinal symptoms.  No recent exertional activity per patient.  Of note, patient took 2 full-strength aspirin prior to arrival.    Allergies:  No Known Allergies    Problem List:    Patient Active Problem List    Diagnosis Date Noted     Abdominal aortic aneurysm (H) 07/24/2007     Priority: High     5/18:  Abdominal aortic aneurysm measures 4.6 x 5.1 x 6.0 cm,  previously 4.7 x 4.8 x 6.1 cm.        S/P CABG (coronary artery bypass graft) 09/20/2021     Priority: Medium     Fluid overload 09/20/2021     Priority: Medium     Transient hyperglycemia post procedure 09/20/2021     Priority: Medium     Benign essential hypertension 09/08/2021     Priority: Medium     Unstable angina pectoris due to coronary arteriosclerosis (H) 09/07/2021     Priority: Medium     Encounter for pre-operative cardiovascular clearance 08/31/2021     Priority: Medium      Added automatically from request for surgery [id number]       Abnormal cardiovascular stress test 08/31/2021     Priority: Medium     Added automatically from request for surgery [id number]       CARDIOVASCULAR SCREENING; LDL GOAL LESS THAN 100 10/31/2010     Priority: Medium     CAD (coronary artery disease) 06/05/2008     Priority: Medium     Benign neoplasm of skin 05/08/2008     Priority: Medium     Problem list name updated by automated process. Provider to review       Abdominal pain, right upper quadrant 07/17/2007     Priority: Medium     Elevated blood pressure reading without diagnosis of hypertension 05/23/2006     Priority: Medium     Family history of other cardiovascular diseases 05/23/2006     Priority: Medium     Problem list name updated by automated process. Provider to review and confirm  Problem list name updated by automated process. Provider to review       Thoracic or lumbosacral neuritis or radiculitis, unspecified 05/23/2006     Priority: Medium        Past Medical History:    Past Medical History:   Diagnosis Date     Basal cell carcinoma      Diverticula of colon      Respiratory complications        Past Surgical History:    Past Surgical History:   Procedure Laterality Date     ARTHROSCOPY OF JOINT UNLISTED      right knee about 1996     BYPASS GRAFT ARTERY CORONARY N/A 9/10/2021    Procedure: CORONARY ARTERY BYPASS GRAFTING X 3 WITH ENDOSCOPIC VEIN HARVEST LIMA-LAD SV-DISTAL RCA/ OM ON PUMP/MANINDER;  Surgeon: Zachary Pelayo MD;  Location:  OR     CV CORONARY ANGIOGRAM N/A 9/7/2021    Procedure: Coronary Angiogram;  Surgeon: Loretta Rosario MD;  Location:  HEART CARDIAC CATH LAB     CV LEFT HEART CATH N/A 9/7/2021    Procedure: Left Heart Cath;  Surgeon: Loretta Rosario MD;  Location:  HEART CARDIAC CATH LAB     SURGICAL HISTORY OF -   02/13/02    Basal cell carcinoma w/positive margins, right  eyebrow & eyelid     ZZC APPENDECTOMY         Family History:    Family  "History   Problem Relation Age of Onset     Cancer Mother         lung cancer     C.A.D. Father         uncertain.  age 79     Unknown/Adopted Maternal Grandmother      Unknown/Adopted Maternal Grandfather      Unknown/Adopted Paternal Grandmother      Unknown/Adopted Paternal Grandfather      Diabetes Brother         Dre     C.A.D. Brother         Dre  age 64       Social History:  Marital Status:   [2]  Social History     Tobacco Use     Smoking status: Former Smoker     Types: Cigarettes     Quit date: 1986     Years since quittin.4     Smokeless tobacco: Never Used   Substance Use Topics     Alcohol use: No     Drug use: No        Medications:    acetaminophen (TYLENOL) 325 MG tablet  amiodarone (PACERONE) 200 MG tablet  amiodarone (PACERONE) 400 MG tablet  aspirin (ASA) 325 MG EC tablet  atorvastatin (LIPITOR) 40 MG tablet  furosemide (LASIX) 20 MG tablet  gabapentin (NEURONTIN) 100 MG capsule  methocarbamol (ROBAXIN) 500 MG tablet  metoprolol succinate ER (TOPROL-XL) 50 MG 24 hr tablet  oxyCODONE (ROXICODONE) 5 MG tablet  polyethylene glycol (MIRALAX) 17 GM/Dose powder  senna-docusate (SENOKOT-S/PERICOLACE) 8.6-50 MG tablet          Review of Systems  Constitutional:  Negative for fever or recent illness.  Cardiovascular: Positive for sharp left-sided inferior lateral chest discomfort.  Or shortness of breath  Respiratory:  Negative for cough.   Gastrointestinal:  Negative for abdominal pain, nausea or vomiting.   Musculoskeletal: Negative for back pain.  Denies recent injury.  Neurological:  Negative for dizziness.    All others reviewed and are negative.      Physical Exam   BP: (!) 171/104  Pulse: 68  Temp: 97.4  F (36.3  C)  Resp: 20  Height: 180.3 cm (5' 11\")  Weight: 90.7 kg (200 lb)  SpO2: 97 %      Physical Exam  Constitutional:  Well developed, well nourished.  Appears nontoxic and in no acute distress.  Resting comfortably on the gurney.  HENT:  Normocephalic and " atraumatic.  Symmetric in appearance.  Eyes:  Conjunctivae are normal.  Neck:  Neck supple.  Cardiovascular:  No cyanosis.  RRR.  No audible murmurs noted.   Respiratory:  Effort normal without sign of respiratory distress.  No audible wheezing or stridor.  CTAB.   Gastrointestinal:  Soft nondistended abdomen.  Nontender and without guarding.  No rigidity or rebound tenderness.  Negative Nicholas's sign.  Negative McBurney's point.  No palpable pulsatile mass.   Musculoskeletal:  Moves extremities spontaneously.  Neurological:  Patient is alert.  Skin:  Skin is warm and dry.  Psychiatric:  Normal mood and affect.      ED Course                 Procedures                EKG Interpretation:      Interpreted by: Jorge Saucedo  Time reviewed: Upon completion    Symptoms at time of EKG: Asymptomatic   Rhythm: Sinus  Rate: 71 bpm  Axis: Normal    Conduction: None atypical   ST Segments/ T Waves: No pathologic ST-elevations or T-wave abnormalities.  Q Waves: None  Comparison to prior: Similar morphology to previous     Clinical Impression: No sign of ischemia         Critical Care time:  none               Results for orders placed or performed during the hospital encounter of 05/21/22 (from the past 24 hour(s))   CBC with platelets differential    Narrative    The following orders were created for panel order CBC with platelets differential.  Procedure                               Abnormality         Status                     ---------                               -----------         ------                     CBC with platelets and d...[[id number]]                      Final result                 Please view results for these tests on the individual orders.   Comprehensive metabolic panel   Result Value Ref Range    Sodium 143 133 - 144 mmol/L    Potassium 4.3 3.4 - 5.3 mmol/L    Chloride 113 (H) 94 - 109 mmol/L    Carbon Dioxide (CO2) 25 20 - 32 mmol/L    Anion Gap 5 3 - 14 mmol/L    Urea Nitrogen 20 7 - 30 mg/dL     Creatinine 1.20 0.66 - 1.25 mg/dL    Calcium 8.7 8.5 - 10.1 mg/dL    Glucose 138 (H) 70 - 99 mg/dL    Alkaline Phosphatase 86 40 - 150 U/L    AST 26 0 - 45 U/L    ALT 24 0 - 70 U/L    Protein Total 7.2 6.8 - 8.8 g/dL    Albumin 3.3 (L) 3.4 - 5.0 g/dL    Bilirubin Total 0.3 0.2 - 1.3 mg/dL    GFR Estimate 59 (L) >60 mL/min/1.73m2   Troponin I   Result Value Ref Range    Troponin I High Sensitivity 19 <79 ng/L   D dimer quantitative   Result Value Ref Range    D-Dimer Quantitative 3.11 (H) 0.00 - 0.50 ug/mL FEU    Narrative    This D-dimer assay is intended for use in conjunction with a clinical pretest probability assessment model to exclude pulmonary embolism (PE) and deep venous thrombosis (DVT) in outpatients suspected of PE or DVT. The cut-off value is 0.50 ug/mL FEU.   INR   Result Value Ref Range    INR 1.09 0.85 - 1.15   [place] Draw    Narrative    The following orders were created for panel order [place] Draw.  Procedure                               Abnormality         Status                     ---------                               -----------         ------                     Extra Red Top Tube[[id number]]                               Final result                 Please view results for these tests on the individual orders.   CBC with platelets and differential   Result Value Ref Range    WBC Count 7.2 4.0 - 11.0 10e3/uL    RBC Count 4.44 4.40 - 5.90 10e6/uL    Hemoglobin 13.6 13.3 - 17.7 g/dL    Hematocrit 42.3 40.0 - 53.0 %    MCV 95 78 - 100 fL    MCH 30.6 26.5 - 33.0 pg    MCHC 32.2 31.5 - 36.5 g/dL    RDW 14.0 10.0 - 15.0 %    Platelet Count 245 150 - 450 10e3/uL    % Neutrophils 55 %    % Lymphocytes 26 %    % Monocytes 13 %    % Eosinophils 4 %    % Basophils 1 %    % Immature Granulocytes 1 %    NRBCs per 100 WBC 0 <1 /100    Absolute Neutrophils 4.0 1.6 - 8.3 10e3/uL    Absolute Lymphocytes 1.9 0.8 - 5.3 10e3/uL    Absolute Monocytes 0.9 0.0 - 1.3 10e3/uL    Absolute Eosinophils 0.3 0.0 - 0.7  10e3/uL    Absolute Basophils 0.1 0.0 - 0.2 10e3/uL    Absolute Immature Granulocytes 0.1 <=0.4 10e3/uL    Absolute NRBCs 0.0 10e3/uL   Extra Red Top Tube   Result Value Ref Range    Hold Specimen JI    CT Chest Pulmonary Embolism w Contrast    Narrative    EXAM: CT CHEST PULMONARY EMBOLISM W CONTRAST  LOCATION: [facility]  DATE/TIME: 5/21/2022 5:21 PM    INDICATION: Left-sided pleuritic chest pain. Elevated d-dimer.  COMPARISON: 9/7/2021  TECHNIQUE: CT chest pulmonary angiogram during arterial phase injection of IV contrast. Multiplanar reformats and MIP reconstructions were performed. Dose reduction techniques were used.   CONTRAST: 84 mL Isovue 370     FINDINGS:  ANGIOGRAM CHEST: Pulmonary arteries are normal caliber and negative for pulmonary emboli. Thoracic aorta is negative for dissection. No CT evidence of right heart strain.    LUNGS AND PLEURA: No change in scattered soft and calcific pleural plaque bilaterally most suggestive of prior asbestos exposure. Tiny benign subpleural nodules present right lung. No new, worrisome abnormality.    MEDIASTINUM/AXILLAE: Interval CABG. No adenopathy. Borderline cardiomegaly. Small hiatal hernia.    CORONARY ARTERY CALCIFICATION: Previous CABG.    UPPER ABDOMEN: Splenic granulomata.    MUSCULOSKELETAL: Postoperative changes from sternotomy.      Impression    IMPRESSION:  1.  No pulmonary emboli.  2.  No etiology for symptoms, no acute abnormalities evident.  3.  Bilateral pleural plaque most suggestive of prior asbestos exposure. Small peripheral benign nodules and evidence for old granulomatous disease.  4.  Interval CABG, no postoperative complication evident.       Medications   0.9% sodium chloride BOLUS (0 mLs Intravenous Stopped 5/21/22 1900)   iopamidol (ISOVUE-370) solution 84 mL (84 mLs Intravenous Given 5/21/22 1729)   sodium chloride 0.9 % bag 500mL for CT scan flush use (100 mLs Intravenous Given 5/21/22 1729)       Assessments  & Plan (with Medical Decision Making)   Dragan Prasad is a 86 year old male who presents to the department for evaluation of left-sided torso pain which began while walking around local store, he denies significant exertional activity prior to onset but does recall having a couple of cups of coffee which is unusual.  Symptoms resolved shortly after arrival to the department, remained comfortable throughout monitored stay.  EKG morphology without ischemia, troponin within reference range but D-dimer significantly elevated prompting CTPA study.  Radiologist reads no sign of pulmonary embolism or etiology for symptoms, patient informed of incidental findings including pleural plaque which could indicate prior asbestos exposure and requires outpatient follow-up.  He is in agreement discharge plan including follow-up with primary provider this week to reestablish care since moving back to [place].      Disclaimer:  This note consists of symbols derived from keyboarding, dictation, and/or voice recognition software.  As a result, there may be errors in the script that have gone undetected.  Please consider this when interpreting information found in the chart.        I have reviewed the nursing notes.    I have reviewed the findings, diagnosis, plan and need for follow up with the patient.       Discharge Medication List as of 5/21/2022  7:01 PM          Final diagnoses:   Intermittent left-sided chest pain       5/21/2022   [facility] EMERGENCY DEPT     Jorge Saucedo DO  05/21/22 4159

## 2022-05-21 NOTE — ED TRIAGE NOTES
LUQ and left chest pain that radiates up to neck, pain started ~1515. Pain worse with deep inhalation, pt had open heart surgery in 09/2021-CABG x 3.      Triage Assessment     Row Name 05/21/22 1611       Triage Assessment (Adult)    Airway WDL WDL       Respiratory WDL    Respiratory WDL WDL       Skin Circulation/Temperature WDL    Skin Circulation/Temperature WDL WDL       Cardiac WDL    Cardiac WDL X;chest pain       Chest Pain Assessment    Chest Pain Location anterior chest, left    Chest Pain Radiation neck    Duration 45-60 min    Precipitating Factors activity    Alleviating Factors rest       Peripheral/Neurovascular WDL    Peripheral Neurovascular WDL WDL       Cognitive/Neuro/Behavioral WDL    Cognitive/Neuro/Behavioral WDL WDL

## 2022-05-21 NOTE — ED NOTES
Pt presents to ED with complaints of left chest/flank pain that started about 1 hour prior to arrival. Taking a deep breath makes the pain worse, rest maybe makes the pain better. Pt has tried 2 aspirin with some relief. Pt reports a pulsing sensation in his neck on the left side.

## 2022-06-03 ENCOUNTER — OFFICE VISIT (OUTPATIENT)
Dept: CARDIOLOGY | Facility: CLINIC | Age: 87
End: 2022-06-03
Payer: MEDICARE

## 2022-06-03 VITALS
BODY MASS INDEX: 30.32 KG/M2 | DIASTOLIC BLOOD PRESSURE: 90 MMHG | SYSTOLIC BLOOD PRESSURE: 153 MMHG | HEART RATE: 59 BPM | WEIGHT: 217.4 LBS | OXYGEN SATURATION: 97 %

## 2022-06-03 DIAGNOSIS — I10 BENIGN ESSENTIAL HYPERTENSION: ICD-10-CM

## 2022-06-03 DIAGNOSIS — I71.40 ABDOMINAL AORTIC ANEURYSM (AAA) WITHOUT RUPTURE (H): ICD-10-CM

## 2022-06-03 DIAGNOSIS — I25.810 CORONARY ARTERY DISEASE INVOLVING CORONARY BYPASS GRAFT OF NATIVE HEART WITHOUT ANGINA PECTORIS: Primary | ICD-10-CM

## 2022-06-03 DIAGNOSIS — E78.5 HYPERLIPIDEMIA LDL GOAL <70: ICD-10-CM

## 2022-06-03 DIAGNOSIS — Z95.1 S/P CABG (CORONARY ARTERY BYPASS GRAFT): ICD-10-CM

## 2022-06-03 DIAGNOSIS — I48.0 PAROXYSMAL ATRIAL FIBRILLATION (H): ICD-10-CM

## 2022-06-03 PROCEDURE — 99215 OFFICE O/P EST HI 40 MIN: CPT | Performed by: NURSE PRACTITIONER

## 2022-06-03 RX ORDER — METOPROLOL SUCCINATE 25 MG/1
25 TABLET, EXTENDED RELEASE ORAL DAILY
Qty: 90 TABLET | Refills: 3 | Status: SHIPPED | OUTPATIENT
Start: 2022-06-03 | End: 2023-08-03

## 2022-06-03 NOTE — LETTER
6/3/2022    [facility]  [address] [address]    RE: Dragan Prasad       Dear Colleague,     I had the pleasure of seeing Dragan Prasad in the [facility].  Cardiology Clinic Progress Note  Dragan Prasad MRN# [id number]   YOB: 1935 Age: 86 year old      Primary Cardiologist:   Dr. Sinclair           History of Presenting Illness:      Dragan Prasad is a pleasant 86 year old patient with a past cardiac history significant for   1. CAD with CABG 9/2021  2. Hypertension  3. Hyperlipidemia  4. Postop atrial fibrillation  5. AAA s/p EVAR  Past medical history significant for possible asbestosis on lung CT  Family history of brother having abdominal aneurysm.    Patient has a history of CAD.  He had abnormal stress test in August 2021.  Coronary angiogram he was found to have severe multivessel disease and underwent CABG x3 (LIMA to LAD, SVG to distal RCA, SVG to OM) on 9/10/2021.  He had postop atrial fibrillation which was treated with amiodarone and converted back to sinus rhythm.  He was sent home on amiodarone and was started on statin.    He had postop surgery follow-up in September and was participating in cardiac rehab.  He continued with some dyspnea on exertion and was continued on Lasix.  Surgery progress note reviewed today.    Patient was seen in the ED on 5/21/2022 with chest and abdominal pain.  D-dimer was elevated and negative for PE.  They noted possible GI source for pain.  Chest CTA showed possible asbestosis they recommended he establish with PCP to follow-up on this.  ER note reviewed today.    Pt presents today for annual follow-up.  BMP May 2022 showing normal electrolytes BUN/creatinine with mildly decreased GFR 59.  Lipids have not been checked since September 2021 and LDL was not at goal at 100.  Results reviewed today.    After his bypass, he was supposed to follow-up with Dr. Sinclair but was in [place] and was unable to  get into see her prior to leaving.  He established with a cardiologist in [place] but is unable to tell me the name of the cardiology group.  He is only able to tell me that he was seen at [facility].  He tells me that they did some testing which sounds like a CT but is unsure what it was for or what the results were.  He will ask the clinic to fax us their notes.    His [place] medication list had him taking metoprolol XL 50 mg twice daily but tells me he is only taking 25 mg daily which is what Dr. Sinclair had originally ordered.  Post CABG this had been increased.  Again, I am unsure what his cardiologist in [place] recommended.  His medication list had Lasix and amiodarone which he states he is not taking.  He is taking his atorvastatin appropriately.  He has been taking full dose aspirin 325 mg daily and I recommended decreasing to 81 mg daily.  He is hesitant about this.    In regards to his postop atrial fibrillation, he is unable to tell me how long he took amiodarone for.  He does not think he had any follow-up heart monitor.  He cannot recall if he had any symptoms with atrial fibrillation.  He is agreeable to a 2-week Zio patch but not 30-day event monitor, to look for recurrence.     Blood pressure today is elevated at 153/90.  He checks his blood pressure at home on a watch and states that blood pressures are generally 120s systolic.  He has never had the watch checked for accuracy.  He also has a home BP monitor which has not been checked for accuracy.  He is agreeable to having this done at the [facility] pharmacy.  He will then keep a log of blood pressure and heart rates.    He denies any angina and states that his dyspnea on exertion has been improving since his bypass but has not completely resolved.  Given his abnormal CT showing possible asbestosis, in the ED, I again recommended establishing with primary care.  He is very adamant that he will not establish with primary care  "as he does not trust them and they will \"send them to somebody else anyways\".  He prefers to follow-up with pulmonologist.    He has followed up with vascular surgery in [place], regarding his AAA repair.  He notes positional lightheadedness that resolves within a few seconds.  He continues with some chest tightness from his bypass surgery.  He also notes a popping sensation in his chest.  I recommended he follow-up with CV surgery nurse regarding this.  He states that he followed up with his cardiologist and that they took x-rays but, again, cannot tell me what the results were.  I have asked him to contact the cardiology clinic and have them fax us their notes. Patient reports no PND, orthopnea, presyncope, syncope, edema, heart racing, or palpitations.    Labs:  LIPID RESULTS:  Lab Results   Component Value Date    CHOL 162 09/09/2021    CHOL 231 (H) 06/06/2017    HDL 40 09/09/2021    HDL 46 06/06/2017     09/09/2021     (H) 06/06/2017    TRIG 111 09/09/2021    TRIG 169 (H) 06/06/2017    CHOLHDLRATIO 6.0 (H) 05/31/2008       LIVER ENZYME RESULTS:  Lab Results   Component Value Date    AST 26 05/21/2022    AST 38 07/20/2007    ALT 24 05/21/2022    ALT 28 07/20/2007       CBC RESULTS:  Lab Results   Component Value Date    WBC 7.2 05/21/2022    WBC 8.8 03/09/2011    RBC 4.44 05/21/2022    RBC 4.72 03/09/2011    HGB 13.6 05/21/2022    HGB 14.5 03/09/2011    HCT 42.3 05/21/2022    HCT 43.5 03/09/2011    MCV 95 05/21/2022    MCV 92 03/09/2011    MCH 30.6 05/21/2022    MCH 30.7 03/09/2011    MCHC 32.2 05/21/2022    MCHC 33.3 03/09/2011    RDW 14.0 05/21/2022    RDW 13.2 03/09/2011     05/21/2022     03/09/2011       BMP RESULTS:  Lab Results   Component Value Date     05/21/2022     06/06/2017    POTASSIUM 4.3 05/21/2022    POTASSIUM 4.4 06/06/2017    CHLORIDE 113 (H) 05/21/2022    CHLORIDE 108 06/06/2017    CO2 25 05/21/2022    CO2 23 06/06/2017    ANIONGAP 5 05/21/2022    " ANIONGAP 9 06/06/2017     (H) 05/21/2022    GLC 89 06/06/2017    BUN 20 05/21/2022    BUN 17 06/06/2017    CR 1.20 05/21/2022    CR 1.03 06/06/2017    GFRESTIMATED 59 (L) 05/21/2022    GFRESTIMATED 53 (L) 05/25/2021    GFRESTBLACK 64 05/25/2021    ARELI 8.7 05/21/2022    ARELI 9.6 06/06/2017        A1C RESULTS:  Lab Results   Component Value Date    A1C 5.8 (H) 09/07/2021       INR RESULTS:  Lab Results   Component Value Date    INR 1.09 05/21/2022    INR 1.37 (H) 09/10/2021       Results reviewed today.       Current Cardiac Medications     Aspirin 325 mg daily  atorvastatin 40 mg daily  Lasix 20 mg daily-patient not taking  Metoprolol XL 50 mg twice daily- Pt taking 25 mg daily  Nitroglycerin as needed                   Assessment and Plan:       Plan    Patient Instructions   Medication Changes:  6. DECREASE aspirin to 81 mg daily     Recommendations:  1. Check blood pressure at least 1 hour after medications. Call the clinic if your blood pressure is consistently greater than 130/80.    2. Call to schedule blood pressure check with  Health pharmacist or primary care nurse visit for free. Please have them send results to cardiology. If you need to have your home blood pressure monitor checked for accuracy, bring that to the appt.     Follow-up:    Set up an appt with primary care or a lung doctor regarding your abnormal lung CT from the ER  Call to schedule:   1. annual Fasting lab in Aug or Sept (lipid);  2. 2 week zio patch to look for atrial fibrillation   3. See Josee SMART for cardiology follow up at [place]: 1 month. Call to schedule.     Cardiology Scheduling~[phone]  Cardiology Clinic RN~[phone] (Radha Gamble RN)            1. CAD    CABG x3 (LIMA to LAD, SVG to distal RCA, SVG to OM) on 9/10/2021    No angina     Continue statin, aspirin, beta-blocker      2. Hypertension    Not Controlled in clinic but reports controlled at home    Continue metoprolol      3. Hyperlipidemia    Last   on 9/2021    Continue atorvastatin 40 mg daily      4. Postop A. fib    Converted to sinus rhythm with amiodarone    Unsure if she was symptomatic    Elevated [id number] score for age, CAD, hypertension    Plan to assess for any recurrence of A. fib      5. AAA s/p EVAR    Follows with vascular surgery in [place]     Continue statin, aspirin               Thank you for allowing me to participate in this delightful patient's care.      This note was completed in part using Dragon voice recognition software. Although reviewed after completion, some word and grammatical errors may occur.    LAURA Egan CNP, APRN, CNP           Data:   All laboratory data reviewed      Total time spent today was 58 mins, reviewing labs, testing, notes, documenting notes, and seeing patient.          Constitutional:  cooperative, alert and oriented, well developed, well nourished, in no acute distress  overweight      Skin:  warm and dry to the touch         Head:  normocephalic       Eyes:  pupils equal and round       ENT:  no pallor or cyanosis       Neck:  no stiffness       Respiratory:  clear to auscultation; normal symmetry        Cardiac: regular rate and rhythm; normal S1 and S2                 pulses full and equal     GI:  abdomen soft, nondistended     Extremities and Muscular Skeletal:  no edema        Neurological:  affect appropriate; no gross motor deficits       Psych:  Alert and Oriented x 3 , appropriate affact      Thank you for allowing me to participate in the care of your patient.      Sincerely,     LAURA Egan CNP     [facility]  cc:   Referred Self,

## 2022-06-03 NOTE — PROGRESS NOTES
Cardiology Clinic Progress Note  Dragan Prasad MRN# [id number]   YOB: 1935 Age: 86 year old      Primary Cardiologist:   Dr. Sinclair           History of Presenting Illness:      Dragan Prasad is a pleasant 86 year old patient with a past cardiac history significant for   1. CAD with CABG 9/2021  2. Hypertension  3. Hyperlipidemia  4. Postop atrial fibrillation  5. AAA s/p EVAR  Past medical history significant for possible asbestosis on lung CT  Family history of brother having abdominal aneurysm.    Patient has a history of CAD.  He had abnormal stress test in August 2021.  Coronary angiogram he was found to have severe multivessel disease and underwent CABG x3 (LIMA to LAD, SVG to distal RCA, SVG to OM) on 9/10/2021.  He had postop atrial fibrillation which was treated with amiodarone and converted back to sinus rhythm.  He was sent home on amiodarone and was started on statin.    He had postop surgery follow-up in September and was participating in cardiac rehab.  He continued with some dyspnea on exertion and was continued on Lasix.  Surgery progress note reviewed today.    Patient was seen in the ED on 5/21/2022 with chest and abdominal pain.  D-dimer was elevated and negative for PE.  They noted possible GI source for pain.  Chest CTA showed possible asbestosis they recommended he establish with PCP to follow-up on this.  ER note reviewed today.    Pt presents today for annual follow-up.  BMP May 2022 showing normal electrolytes BUN/creatinine with mildly decreased GFR 59.  Lipids have not been checked since September 2021 and LDL was not at goal at 100.  Results reviewed today.    After his bypass, he was supposed to follow-up with Dr. Sinclair but was in [place] and was unable to get into see her prior to leaving.  He established with a cardiologist in [place] but is unable to tell me the name of the cardiology group.  He is only able to tell me that he was seen at [facility].  He  "tells me that they did some testing which sounds like a CT but is unsure what it was for or what the results were.  He will ask the clinic to fax us their notes.    His [place] medication list had him taking metoprolol XL 50 mg twice daily but tells me he is only taking 25 mg daily which is what Dr. Sinclair had originally ordered.  Post CABG this had been increased.  Again, I am unsure what his cardiologist in [place] recommended.  His medication list had Lasix and amiodarone which he states he is not taking.  He is taking his atorvastatin appropriately.  He has been taking full dose aspirin 325 mg daily and I recommended decreasing to 81 mg daily.  He is hesitant about this.    In regards to his postop atrial fibrillation, he is unable to tell me how long he took amiodarone for.  He does not think he had any follow-up heart monitor.  He cannot recall if he had any symptoms with atrial fibrillation.  He is agreeable to a 2-week Zio patch but not 30-day event monitor, to look for recurrence.     Blood pressure today is elevated at 153/90.  He checks his blood pressure at home on a watch and states that blood pressures are generally 120s systolic.  He has never had the watch checked for accuracy.  He also has a home BP monitor which has not been checked for accuracy.  He is agreeable to having this done at the [facility] pharmacy.  He will then keep a log of blood pressure and heart rates.    He denies any angina and states that his dyspnea on exertion has been improving since his bypass but has not completely resolved.  Given his abnormal CT showing possible asbestosis, in the ED, I again recommended establishing with primary care.  He is very adamant that he will not establish with primary care as he does not trust them and they will \"send them to somebody else anyways\".  He prefers to follow-up with pulmonologist.    He has followed up with vascular surgery in [place], regarding his AAA repair.  He notes " positional lightheadedness that resolves within a few seconds.  He continues with some chest tightness from his bypass surgery.  He also notes a popping sensation in his chest.  I recommended he follow-up with CV surgery nurse regarding this.  He states that he followed up with his cardiologist and that they took x-rays but, again, cannot tell me what the results were.  I have asked him to contact the cardiology clinic and have them fax us their notes. Patient reports no PND, orthopnea, presyncope, syncope, edema, heart racing, or palpitations.    Labs:  LIPID RESULTS:  Lab Results   Component Value Date    CHOL 162 09/09/2021    CHOL 231 (H) 06/06/2017    HDL 40 09/09/2021    HDL 46 06/06/2017     09/09/2021     (H) 06/06/2017    TRIG 111 09/09/2021    TRIG 169 (H) 06/06/2017    CHOLHDLRATIO 6.0 (H) 05/31/2008       LIVER ENZYME RESULTS:  Lab Results   Component Value Date    AST 26 05/21/2022    AST 38 07/20/2007    ALT 24 05/21/2022    ALT 28 07/20/2007       CBC RESULTS:  Lab Results   Component Value Date    WBC 7.2 05/21/2022    WBC 8.8 03/09/2011    RBC 4.44 05/21/2022    RBC 4.72 03/09/2011    HGB 13.6 05/21/2022    HGB 14.5 03/09/2011    HCT 42.3 05/21/2022    HCT 43.5 03/09/2011    MCV 95 05/21/2022    MCV 92 03/09/2011    MCH 30.6 05/21/2022    MCH 30.7 03/09/2011    MCHC 32.2 05/21/2022    MCHC 33.3 03/09/2011    RDW 14.0 05/21/2022    RDW 13.2 03/09/2011     05/21/2022     03/09/2011       BMP RESULTS:  Lab Results   Component Value Date     05/21/2022     06/06/2017    POTASSIUM 4.3 05/21/2022    POTASSIUM 4.4 06/06/2017    CHLORIDE 113 (H) 05/21/2022    CHLORIDE 108 06/06/2017    CO2 25 05/21/2022    CO2 23 06/06/2017    ANIONGAP 5 05/21/2022    ANIONGAP 9 06/06/2017     (H) 05/21/2022    GLC 89 06/06/2017    BUN 20 05/21/2022    BUN 17 06/06/2017    CR 1.20 05/21/2022    CR 1.03 06/06/2017    GFRESTIMATED 59 (L) 05/21/2022    GFRESTIMATED 53 (L)  05/25/2021    GFRJUDY 64 05/25/2021    ARELI 8.7 05/21/2022    ARELI 9.6 06/06/2017        A1C RESULTS:  Lab Results   Component Value Date    A1C 5.8 (H) 09/07/2021       INR RESULTS:  Lab Results   Component Value Date    INR 1.09 05/21/2022    INR 1.37 (H) 09/10/2021       Results reviewed today.       Current Cardiac Medications     Aspirin 325 mg daily  atorvastatin 40 mg daily  Lasix 20 mg daily-patient not taking  Metoprolol XL 50 mg twice daily- Pt taking 25 mg daily  Nitroglycerin as needed                   Assessment and Plan:       Plan    Patient Instructions   Medication Changes:  6. DECREASE aspirin to 81 mg daily     Recommendations:  1. Check blood pressure at least 1 hour after medications. Call the clinic if your blood pressure is consistently greater than 130/80.    2. Call to schedule blood pressure check with  Health pharmacist or primary care nurse visit for free. Please have them send results to cardiology. If you need to have your home blood pressure monitor checked for accuracy, bring that to the appt.     Follow-up:    Set up an appt with primary care or a lung doctor regarding your abnormal lung CT from the ER  Call to schedule:   1. annual Fasting lab in Aug or Sept (lipid);  2. 2 week zio patch to look for atrial fibrillation   3. See Josee SMART for cardiology follow up at [place]: 1 month. Call to schedule.     Cardiology Scheduling~[phone]  Cardiology Clinic RN~[phone] (Radha Gamble, ADRIENNE)            1. CAD    CABG x3 (LIMA to LAD, SVG to distal RCA, SVG to OM) on 9/10/2021    No angina     Continue statin, aspirin, beta-blocker      2. Hypertension    Not Controlled in clinic but reports controlled at home    Continue metoprolol      3. Hyperlipidemia    Last  on 9/2021    Continue atorvastatin 40 mg daily      4. Postop A. fib    Converted to sinus rhythm with amiodarone    Unsure if she was symptomatic    Elevated [id number] score for age, CAD,  hypertension    Plan to assess for any recurrence of A. fib      5. AAA s/p EVAR    Follows with vascular surgery in [place]     Continue statin, aspirin               Thank you for allowing me to participate in this delightful patient's care.      This note was completed in part using Dragon voice recognition software. Although reviewed after completion, some word and grammatical errors may occur.    Josee [place], APRN CNP, APRN, CNP           Data:   All laboratory data reviewed      Total time spent today was 58 mins, reviewing labs, testing, notes, documenting notes, and seeing patient.          Constitutional:  cooperative, alert and oriented, well developed, well nourished, in no acute distress  overweight      Skin:  warm and dry to the touch         Head:  normocephalic       Eyes:  pupils equal and round       ENT:  no pallor or cyanosis       Neck:  no stiffness       Respiratory:  clear to auscultation; normal symmetry        Cardiac: regular rate and rhythm; normal S1 and S2                 pulses full and equal     GI:  abdomen soft, nondistended     Extremities and Muscular Skeletal:  no edema        Neurological:  affect appropriate; no gross motor deficits       Psych:  Alert and Oriented x 3 , appropriate affact

## 2022-06-03 NOTE — PATIENT INSTRUCTIONS
Medication Changes:  DECREASE aspirin to 81 mg daily     Recommendations:  Check blood pressure at least 1 hour after medications. Call the clinic if your blood pressure is consistently greater than 130/80.    Call to schedule blood pressure check with  Health pharmacist or primary care nurse visit for free. Please have them send results to cardiology. If you need to have your home blood pressure monitor checked for accuracy, bring that to the appt.     Follow-up:    Set up an appt with primary care or a lung doctor regarding your abnormal lung CT from the ER  Call to schedule:   annual Fasting lab in Aug or Sept (lipid);  2 week zio patch to look for atrial fibrillation   See Josee SMART for cardiology follow up at [place]: 1 month. Call to schedule.     Cardiology Scheduling~[phone]  Cardiology Clinic RN~[phone] (Radha Gamble, ADRIENNE)

## 2023-04-18 NOTE — PLAN OF CARE
Pt here POD1 CABG X3. A+Ox4. VSS on 4 L O2 via NC. Neuros intact. Maddox patent with adequate output. Chest tubes to wall suction. Pacer wires capped. Cardiac diet, thin liquids. Up with Ax1-2 GBW. Gave scheduled gabapentin, along with PRN 5 mg oxycodone for incisional/coughing pain. Incisions- WDL. Discharge pending- Therapy recommending home with assist and OP cardiac rehab.    Island Pedicle Flap With Canthal Suspension Text: The defect edges were debeveled with a #15 scalpel blade.  Given the location of the defect, shape of the defect and the proximity to free margins an island pedicle advancement flap was deemed most appropriate.  Using a sterile surgical marker, an appropriate advancement flap was drawn incorporating the defect, outlining the appropriate donor tissue and placing the expected incisions within the relaxed skin tension lines where possible. The area thus outlined was incised deep to adipose tissue with a #15 scalpel blade.  The skin margins were undermined to an appropriate distance in all directions around the primary defect and laterally outward around the island pedicle utilizing iris scissors.  There was minimal undermining beneath the pedicle flap. A suspension suture was placed in the canthal tendon to prevent tension and prevent ectropion.

## 2023-08-01 ENCOUNTER — TELEPHONE (OUTPATIENT)
Dept: FAMILY MEDICINE | Facility: CLINIC | Age: 88
End: 2023-08-01
Payer: MEDICARE

## 2023-08-01 NOTE — TELEPHONE ENCOUNTER
Pt asking for appt with provider who will do lt knee injection. States has had knee injection in past- not sure where/ when- possibly ortho).  Last primary care visit 05-29-19, Dr. Bolivar. [facility] sees provider in [place] where he singh. [facility] last saw this past winter.  C/O lt knee pain, no swelling. Taking daily ASA only (stopped all other prescription meds- states discontinued by [place] provider). Not walking with any assistive device.   Pt prefers to est care with another MD.   Please advise if OK to schedule in same day spot?  TOMY Blanco, RN

## 2023-08-03 ENCOUNTER — ANCILLARY PROCEDURE (OUTPATIENT)
Dept: GENERAL RADIOLOGY | Facility: CLINIC | Age: 88
End: 2023-08-03
Attending: FAMILY MEDICINE
Payer: MEDICARE

## 2023-08-03 ENCOUNTER — OFFICE VISIT (OUTPATIENT)
Dept: FAMILY MEDICINE | Facility: CLINIC | Age: 88
End: 2023-08-03
Payer: MEDICARE

## 2023-08-03 VITALS
HEART RATE: 79 BPM | TEMPERATURE: 97.8 F | SYSTOLIC BLOOD PRESSURE: 142 MMHG | HEIGHT: 71 IN | WEIGHT: 213 LBS | BODY MASS INDEX: 29.82 KG/M2 | RESPIRATION RATE: 18 BRPM | DIASTOLIC BLOOD PRESSURE: 88 MMHG | OXYGEN SATURATION: 99 %

## 2023-08-03 DIAGNOSIS — M25.562 CHRONIC PAIN OF LEFT KNEE: ICD-10-CM

## 2023-08-03 DIAGNOSIS — M17.12 PRIMARY OSTEOARTHRITIS OF LEFT KNEE: ICD-10-CM

## 2023-08-03 DIAGNOSIS — G89.29 CHRONIC PAIN OF LEFT KNEE: Primary | ICD-10-CM

## 2023-08-03 DIAGNOSIS — G89.29 CHRONIC PAIN OF LEFT KNEE: ICD-10-CM

## 2023-08-03 DIAGNOSIS — M11.20 CHONDROCALCINOSIS: ICD-10-CM

## 2023-08-03 DIAGNOSIS — M25.562 CHRONIC PAIN OF LEFT KNEE: Primary | ICD-10-CM

## 2023-08-03 PROCEDURE — 20610 DRAIN/INJ JOINT/BURSA W/O US: CPT | Mod: LT | Performed by: FAMILY MEDICINE

## 2023-08-03 PROCEDURE — 73562 X-RAY EXAM OF KNEE 3: CPT | Mod: TC | Performed by: RADIOLOGY

## 2023-08-03 RX ORDER — TRIAMCINOLONE ACETONIDE 40 MG/ML
40 INJECTION, SUSPENSION INTRA-ARTICULAR; INTRAMUSCULAR ONCE
Status: COMPLETED | OUTPATIENT
Start: 2023-08-03 | End: 2023-08-03

## 2023-08-03 RX ADMIN — TRIAMCINOLONE ACETONIDE 40 MG: 40 INJECTION, SUSPENSION INTRA-ARTICULAR; INTRAMUSCULAR at 10:53

## 2023-08-03 ASSESSMENT — PAIN SCALES - GENERAL: PAINLEVEL: NO PAIN (0)

## 2023-08-03 NOTE — PROGRESS NOTES
"  Assessment & Plan     Chronic pain of left knee  Knee osteoarthritis  Chondrocalcinosis  Management options discussed.  Steroid injection administered in office today, see procedure note for detail.  Patient deferred physical therapy  - XR Knee Left 3 Views; Future  - Large Joint/Bursa injection and/or drainage (Shoulder, Knee)  - triamcinolone (KENALOG-40) injection 40 mg      PROCEDURE:  JOINT INJECTION.    After a discussion of risks, benefits and side effects of procedure, informed patient consent was obtained.  The left knee was prepped and draped in the usual clean fashion (sterile not required for this procedure).  3 cc of 1 % lidocaine was used for local analgesia.    ASPIRATION: Not performed.     INJECTION:  Using 3 cc of 1 % lidocaine mixed with 40 mg of Kenalog, the left knee was successfully injected without complication.  Patient did experience some pain relief following injection.      Hilario Jordan MD  [facility]    Ricardo Archibald is a 87 year old, presenting for the following health issues:  Knee Pain      History of Present Illness       Reason for visit:  Knee pain  Symptom onset:  More than a month  Symptoms include:  Left knee.  Symptom intensity:  Moderate  Symptom progression:  Worsening  Prior treatment description:  Had arthroscopic surgery on it years ago          Review of Systems   Constitutional, HEENT, cardiovascular, pulmonary, gi and gu systems are negative, except as otherwise noted.      Objective    BP (!) 142/88 (Cuff Size: Adult Regular)   Pulse 79   Temp 97.8  F (36.6  C) (Tympanic)   Resp 18   Ht 1.803 m (5' 11\")   Wt 96.6 kg (213 lb)   SpO2 99%   BMI 29.71 kg/m    Body mass index is 29.71 kg/m .  Physical Exam   GENERAL: alert and no distress  RESP: no wheezes  MS: Chronic OA changes involving left knee, no joint swelling or skin discoloration noted, gait antalgic, sensation to touch and pressure intact  SKIN: no suspicious lesions " or rashes  NEURO: Normal strength and tone, mentation intact and speech normal  PSYCH: mentation appears normal, affect normal/bright

## 2023-08-03 NOTE — NURSING NOTE
"Chief Complaint   Patient presents with    Knee Pain     BP (!) 142/88 (Cuff Size: Adult Regular)   Pulse 79   Temp 97.8  F (36.6  C) (Tympanic)   Resp 18   Ht 1.803 m (5' 11\")   Wt 96.6 kg (213 lb)   SpO2 99%   BMI 29.71 kg/m   Estimated body mass index is 29.71 kg/m  as calculated from the following:    Height as of this encounter: 1.803 m (5' 11\").    Weight as of this encounter: 96.6 kg (213 lb).  Patient presents to the clinic using No DME      Health Maintenance that is potentially due pending provider review:    Health Maintenance Due   Topic Date Due    ANNUAL REVIEW OF HM ORDERS  Never done    ADVANCE CARE PLANNING  Never done    ZOSTER IMMUNIZATION (1 of 2) Never done    MEDICARE ANNUAL WELLNESS VISIT  05/23/2007    DTAP/TDAP/TD IMMUNIZATION (1 - Tdap) 05/21/2008    Pneumococcal Vaccine: 65+ Years (2 - PCV) 03/18/2012    COVID-19 Vaccine (2 - Moderna series) 12/23/2021                  "

## 2023-09-23 ENCOUNTER — HOSPITAL ENCOUNTER (EMERGENCY)
Facility: CLINIC | Age: 88
Discharge: HOME | End: 2023-09-23
Attending: FAMILY MEDICINE | Admitting: FAMILY MEDICINE
Payer: MEDICARE

## 2023-09-23 ENCOUNTER — OFFICE VISIT (OUTPATIENT)
Dept: URGENT CARE | Facility: URGENT CARE | Age: 88
End: 2023-09-23
Payer: MEDICARE

## 2023-09-23 ENCOUNTER — ANCILLARY PROCEDURE (OUTPATIENT)
Dept: GENERAL RADIOLOGY | Facility: CLINIC | Age: 88
End: 2023-09-23
Attending: FAMILY MEDICINE
Payer: MEDICARE

## 2023-09-23 VITALS
DIASTOLIC BLOOD PRESSURE: 95 MMHG | OXYGEN SATURATION: 96 % | HEART RATE: 71 BPM | SYSTOLIC BLOOD PRESSURE: 167 MMHG | RESPIRATION RATE: 18 BRPM | BODY MASS INDEX: 30.01 KG/M2 | TEMPERATURE: 97.6 F | WEIGHT: 215.2 LBS

## 2023-09-23 VITALS
DIASTOLIC BLOOD PRESSURE: 98 MMHG | OXYGEN SATURATION: 96 % | HEART RATE: 75 BPM | TEMPERATURE: 97.9 F | RESPIRATION RATE: 15 BRPM | HEIGHT: 72 IN | WEIGHT: 214 LBS | BODY MASS INDEX: 28.99 KG/M2 | SYSTOLIC BLOOD PRESSURE: 148 MMHG

## 2023-09-23 DIAGNOSIS — R07.89 CHEST PRESSURE: Primary | ICD-10-CM

## 2023-09-23 DIAGNOSIS — R79.89 TROPONIN LEVEL ELEVATED: ICD-10-CM

## 2023-09-23 DIAGNOSIS — I48.11 LONGSTANDING PERSISTENT ATRIAL FIBRILLATION (H): ICD-10-CM

## 2023-09-23 DIAGNOSIS — I50.9 CONGESTIVE HEART FAILURE, UNSPECIFIED HF CHRONICITY, UNSPECIFIED HEART FAILURE TYPE (H): ICD-10-CM

## 2023-09-23 DIAGNOSIS — R07.89 CHEST PRESSURE: ICD-10-CM

## 2023-09-23 LAB
ALBUMIN SERPL BCG-MCNC: 3.9 G/DL (ref 3.5–5.2)
ALP SERPL-CCNC: 88 U/L (ref 40–129)
ALT SERPL W P-5'-P-CCNC: 59 U/L (ref 0–70)
ANION GAP SERPL CALCULATED.3IONS-SCNC: 11 MMOL/L (ref 7–15)
AST SERPL W P-5'-P-CCNC: 54 U/L (ref 0–45)
BASOPHILS # BLD AUTO: 0.1 10E3/UL (ref 0–0.2)
BASOPHILS NFR BLD AUTO: 1 %
BILIRUB SERPL-MCNC: 0.9 MG/DL
BUN SERPL-MCNC: 16.6 MG/DL (ref 8–23)
CALCIUM SERPL-MCNC: 9.4 MG/DL (ref 8.8–10.2)
CHLORIDE SERPL-SCNC: 106 MMOL/L (ref 98–107)
CREAT SERPL-MCNC: 1.27 MG/DL (ref 0.67–1.17)
DEPRECATED HCO3 PLAS-SCNC: 24 MMOL/L (ref 22–29)
EGFRCR SERPLBLD CKD-EPI 2021: 55 ML/MIN/1.73M2
EOSINOPHIL # BLD AUTO: 0.1 10E3/UL (ref 0–0.7)
EOSINOPHIL NFR BLD AUTO: 1 %
ERYTHROCYTE [DISTWIDTH] IN BLOOD BY AUTOMATED COUNT: 13.6 % (ref 10–15)
GLUCOSE SERPL-MCNC: 105 MG/DL (ref 70–99)
HCT VFR BLD AUTO: 45.6 % (ref 40–53)
HGB BLD-MCNC: 14.9 G/DL (ref 13.3–17.7)
IMM GRANULOCYTES # BLD: 0 10E3/UL
IMM GRANULOCYTES NFR BLD: 0 %
LYMPHOCYTES # BLD AUTO: 1.4 10E3/UL (ref 0.8–5.3)
LYMPHOCYTES NFR BLD AUTO: 15 %
MCH RBC QN AUTO: 32 PG (ref 26.5–33)
MCHC RBC AUTO-ENTMCNC: 32.7 G/DL (ref 31.5–36.5)
MCV RBC AUTO: 98 FL (ref 78–100)
MONOCYTES # BLD AUTO: 1.4 10E3/UL (ref 0–1.3)
MONOCYTES NFR BLD AUTO: 14 %
NEUTROPHILS # BLD AUTO: 6.7 10E3/UL (ref 1.6–8.3)
NEUTROPHILS NFR BLD AUTO: 69 %
NRBC # BLD AUTO: 0 10E3/UL
NRBC BLD AUTO-RTO: 0 /100
NT-PROBNP SERPL-MCNC: 5516 PG/ML (ref 0–1800)
PLATELET # BLD AUTO: 197 10E3/UL (ref 150–450)
POTASSIUM SERPL-SCNC: 4.4 MMOL/L (ref 3.4–5.3)
PROT SERPL-MCNC: 7.1 G/DL (ref 6.4–8.3)
RBC # BLD AUTO: 4.66 10E6/UL (ref 4.4–5.9)
SODIUM SERPL-SCNC: 141 MMOL/L (ref 136–145)
TROPONIN T SERPL HS-MCNC: 36 NG/L
TROPONIN T SERPL HS-MCNC: 37 NG/L
WBC # BLD AUTO: 9.6 10E3/UL (ref 4–11)

## 2023-09-23 PROCEDURE — 93010 ELECTROCARDIOGRAM REPORT: CPT | Mod: 77 | Performed by: FAMILY MEDICINE

## 2023-09-23 PROCEDURE — 99214 OFFICE O/P EST MOD 30 MIN: CPT | Mod: 25 | Performed by: FAMILY MEDICINE

## 2023-09-23 PROCEDURE — 85025 COMPLETE CBC W/AUTO DIFF WBC: CPT | Performed by: FAMILY MEDICINE

## 2023-09-23 PROCEDURE — 71046 X-RAY EXAM CHEST 2 VIEWS: CPT | Mod: TC | Performed by: RADIOLOGY

## 2023-09-23 PROCEDURE — 84484 ASSAY OF TROPONIN QUANT: CPT | Performed by: FAMILY MEDICINE

## 2023-09-23 PROCEDURE — 99285 EMERGENCY DEPT VISIT HI MDM: CPT | Mod: 25

## 2023-09-23 PROCEDURE — 250N000011 HC RX IP 250 OP 636: Mod: JZ | Performed by: FAMILY MEDICINE

## 2023-09-23 PROCEDURE — 80053 COMPREHEN METABOLIC PANEL: CPT | Performed by: FAMILY MEDICINE

## 2023-09-23 PROCEDURE — 76604 US EXAM CHEST: CPT

## 2023-09-23 PROCEDURE — 36415 COLL VENOUS BLD VENIPUNCTURE: CPT | Performed by: FAMILY MEDICINE

## 2023-09-23 PROCEDURE — 96374 THER/PROPH/DIAG INJ IV PUSH: CPT

## 2023-09-23 PROCEDURE — 76604 US EXAM CHEST: CPT | Mod: 26 | Performed by: FAMILY MEDICINE

## 2023-09-23 PROCEDURE — 93005 ELECTROCARDIOGRAM TRACING: CPT

## 2023-09-23 PROCEDURE — 83880 ASSAY OF NATRIURETIC PEPTIDE: CPT | Performed by: FAMILY MEDICINE

## 2023-09-23 PROCEDURE — 99285 EMERGENCY DEPT VISIT HI MDM: CPT | Mod: 25 | Performed by: FAMILY MEDICINE

## 2023-09-23 PROCEDURE — 93000 ELECTROCARDIOGRAM COMPLETE: CPT | Performed by: FAMILY MEDICINE

## 2023-09-23 RX ORDER — LISINOPRIL 2.5 MG/1
2.5 TABLET ORAL DAILY
Qty: 30 TABLET | Refills: 0 | Status: SHIPPED | OUTPATIENT
Start: 2023-09-23 | End: 2023-10-23

## 2023-09-23 RX ORDER — POTASSIUM CHLORIDE 750 MG/1
10 TABLET, EXTENDED RELEASE ORAL 2 TIMES DAILY
Qty: 60 TABLET | Refills: 0 | Status: SHIPPED | OUTPATIENT
Start: 2023-09-23 | End: 2023-10-23

## 2023-09-23 RX ORDER — METOPROLOL SUCCINATE 25 MG/1
25 TABLET, EXTENDED RELEASE ORAL DAILY
Qty: 30 TABLET | Refills: 0 | Status: SHIPPED | OUTPATIENT
Start: 2023-09-23

## 2023-09-23 RX ORDER — FUROSEMIDE 10 MG/ML
60 INJECTION INTRAMUSCULAR; INTRAVENOUS ONCE
Status: COMPLETED | OUTPATIENT
Start: 2023-09-23 | End: 2023-09-23

## 2023-09-23 RX ORDER — FUROSEMIDE 20 MG
20 TABLET ORAL 2 TIMES DAILY
Qty: 120 TABLET | Refills: 0 | Status: SHIPPED | OUTPATIENT
Start: 2023-09-23 | End: 2023-11-22

## 2023-09-23 RX ADMIN — FUROSEMIDE 60 MG: 10 INJECTION, SOLUTION INTRAMUSCULAR; INTRAVENOUS at 13:56

## 2023-09-23 ASSESSMENT — ENCOUNTER SYMPTOMS
VOMITING: 0
CONSTIPATION: 0
HEADACHES: 0
NAUSEA: 0
DIAPHORESIS: 0
PALPITATIONS: 0
DIARRHEA: 0
FREQUENCY: 0
SHORTNESS OF BREATH: 1
DYSURIA: 0
ABDOMINAL PAIN: 0
SORE THROAT: 0
BLOOD IN STOOL: 0
SINUS PRESSURE: 0
WHEEZING: 0
COUGH: 0
CHILLS: 0
FEVER: 0

## 2023-09-23 ASSESSMENT — ACTIVITIES OF DAILY LIVING (ADL)
ADLS_ACUITY_SCORE: 35
ADLS_ACUITY_SCORE: 35

## 2023-09-23 NOTE — ED PROVIDER NOTES
History     Chief Complaint   Patient presents with    Shortness of Breath     HPI  Dragan Prasad is a 87 year old male who presents with a history of abdominal aortic aneurysm status postrepair.  Status post coronary artery bypass graft.  Procedures were about 2 years ago.  History of paroxysmal A-fib and only on aspirin.  I do not see that he is on a rate control agent.  Hyperlipidemia.  Hypertension.  He presents here for from [facility] with chest pressure upper chest, shortness of breath primarily with orthopnea.  Sitting up in a chair results in improved symptoms.  He has no exertional chest pain.  [id number] score in clinic was 4.  No leg edema.  Decreased walk distance.    Allergies:  No Known Allergies    Problem List:    Patient Active Problem List    Diagnosis Date Noted    Abdominal aortic aneurysm (AAA) without rupture (H) 07/24/2007     Priority: High     5/18:  Abdominal aortic aneurysm measures 4.6 x 5.1 x 6.0 cm,  previously 4.7 x 4.8 x 6.1 cm.       Paroxysmal atrial fibrillation (H) 06/03/2022     Priority: Medium    Hyperlipidemia LDL goal <70 06/03/2022     Priority: Medium    S/P CABG (coronary artery bypass graft) 09/20/2021     Priority: Medium    Fluid overload 09/20/2021     Priority: Medium    Transient hyperglycemia post procedure 09/20/2021     Priority: Medium    Benign essential hypertension 09/08/2021     Priority: Medium    Unstable angina pectoris due to coronary arteriosclerosis (H) 09/07/2021     Priority: Medium    Encounter for pre-operative cardiovascular clearance 08/31/2021     Priority: Medium     Added automatically from request for surgery [id number]      Abnormal cardiovascular stress test 08/31/2021     Priority: Medium     Added automatically from request for surgery [id number]      CARDIOVASCULAR SCREENING; LDL GOAL LESS THAN 100 10/31/2010     Priority: Medium    CAD (coronary artery disease) 06/05/2008     Priority: Medium    Benign neoplasm of skin  2008     Priority: Medium     Problem list name updated by automated process. Provider to review      Abdominal pain, right upper quadrant 2007     Priority: Medium    Elevated blood pressure reading without diagnosis of hypertension 2006     Priority: Medium    Family history of other cardiovascular diseases 2006     Priority: Medium     Problem list name updated by automated process. Provider to review and confirm  Problem list name updated by automated process. Provider to review      Thoracic or lumbosacral neuritis or radiculitis, unspecified 2006     Priority: Medium        Past Medical History:    Past Medical History:   Diagnosis Date    Basal cell carcinoma     Diverticula of colon     Respiratory complications        Past Surgical History:    Past Surgical History:   Procedure Laterality Date    ARTHROSCOPY OF JOINT UNLISTED      right knee about     BYPASS GRAFT ARTERY CORONARY N/A 9/10/2021    Procedure: CORONARY ARTERY BYPASS GRAFTING X 3 WITH ENDOSCOPIC VEIN HARVEST LIMA-LAD SV-DISTAL RCA/ OM ON PUMP/MANINDER;  Surgeon: Zachary Pelayo MD;  Location:  OR    CV CORONARY ANGIOGRAM N/A 2021    Procedure: Coronary Angiogram;  Surgeon: Loretta Rosario MD;  Location:  HEART CARDIAC CATH LAB    CV LEFT HEART CATH N/A 2021    Procedure: Left Heart Cath;  Surgeon: Loretta Rosario MD;  Location:  HEART CARDIAC CATH LAB    SURGICAL HISTORY OF -   02    Basal cell carcinoma w/positive margins, right  eyebrow & eyelid    ZZC APPENDECTOMY         Family History:    Family History   Problem Relation Age of Onset    Cancer Mother         lung cancer    C.A.D. Father         uncertain.  age 79    Unknown/Adopted Maternal Grandmother     Unknown/Adopted Maternal Grandfather     Unknown/Adopted Paternal Grandmother     Unknown/Adopted Paternal Grandfather     Diabetes Brother         Dre    C.A.D. Brother         Dre  age 64       Social  "History:  Marital Status:   [2]  Social History     Tobacco Use    Smoking status: Former     Types: Cigarettes     Quit date: 1986     Years since quittin.7     Passive exposure: Past    Smokeless tobacco: Never   Vaping Use    Vaping Use: Never used   Substance Use Topics    Alcohol use: No    Drug use: No        Medications:    aspirin (ASA) 81 MG EC tablet          Review of Systems   Constitutional:  Negative for chills, diaphoresis and fever.   HENT:  Negative for ear pain, sinus pressure and sore throat.    Eyes:  Negative for visual disturbance.   Respiratory:  Positive for shortness of breath. Negative for cough and wheezing.    Cardiovascular:  Positive for chest pain. Negative for palpitations.   Gastrointestinal:  Negative for abdominal pain, blood in stool, constipation, diarrhea, nausea and vomiting.   Genitourinary:  Negative for dysuria, frequency and urgency.   Skin:  Negative for rash.   Neurological:  Negative for headaches.   All other systems reviewed and are negative.      Physical Exam   Pulse: 75  Temp: 97.9  F (36.6  C)  Resp: 20  Height: 181.6 cm (5' 11.5\")  Weight: 97.1 kg (214 lb)  SpO2: 93 %      Physical Exam  Constitutional:       General: He is in acute distress.      Appearance: He is not diaphoretic.   Eyes:      Conjunctiva/sclera: Conjunctivae normal.   Cardiovascular:      Rate and Rhythm: Normal rate and regular rhythm.      Heart sounds: No murmur heard.  Pulmonary:      Effort: Pulmonary effort is normal. No respiratory distress.      Breath sounds: Normal breath sounds. No stridor. No wheezing or rhonchi.   Abdominal:      General: Abdomen is flat. There is no distension.      Palpations: Abdomen is soft. There is no mass.      Tenderness: There is no abdominal tenderness. There is no guarding.   Musculoskeletal:      Cervical back: Neck supple.      Right lower leg: No edema.      Left lower leg: No edema.   Skin:     Coloration: Skin is not pale.      " Findings: No rash.   Neurological:      General: No focal deficit present.      Mental Status: He is alert.      Motor: No weakness.             ED Course               EKG Interpretation:      Interpreted by Filiberto Howard MD  EKG done at 1244 hrs. demonstrates a atrial fibrillation 90 bpm with a normal axis.  There is no ST change.  No T wave changes.  There is a poor R progression V1 through V3.  No ectopy.  Normal conduction.  No interval changes other than absent NE.  Impression atrial fibrillation at 90 bpm with no acute changes.         Procedures    Results for orders placed during the hospital encounter of 09/23/23    POC US CHEST B-SCAN    Impression  [facility] Procedure Note    Limited Bedside ED Cardiac Ultrasound:    PROCEDURE: PERFORMED BY: Dr. Filiberto Howard MD  INDICATIONS/SYMPTOM:  Shortness of Breath  PROBE: Cardiac phased array probe and High frequency linear probe  BODY LOCATION: Chest  FINDINGS:  The ultrasound was performed utilizing the subcostal, parasternal long axis, parasternal short axis, and apical 4 chamber views.  Cardiac contractility:  Present  Gross estimation of cardiac kinesis: normal  Pericardial Effusion:  None  RV:LV ratio: LV >> RV  IVC:  Diameter:  IVC diameter expiration (IVCe) 3+ cm  IVC diameter inspiration (IVCi) 3+ cm  Collapsibility:  IVC collapses < 50% with inspiration  INTERPRETATION:    Chamber size and motion were grossly normal with LV > RV, normal cardiac kinesis.  No pericardial effusion was found.  IVC visualized and findings indicate hypervolemia.  IMAGE DOCUMENTATION: Images were archived to PACs system.      [facility] Procedure Note    Limited Bedside ED Ultrasound of Thorax:    PROCEDURE: PERFORMED BY: Dr. Filiberto Howard MD  INDICATIONS/SYMPTOM:  shortness of breath  PROBE: High frequency linear probe  BODY LOCATION: Chest  FINDINGS:  Images of both lung hemithoracies taken in 2D in multiple rib spaces    Right side:  Lung sliding  artifact  Present  Comet tail artifacts  Absent  Left side:  Lung sliding artifact  Present  Comet tail artifacts  Present  Hemothorax: Right side Absent  Left side Absent  Pleural effusion: Right side Absent  Left side Absent    INTERPRETATION: The exam was normal. There was no free fluid identified in the chest cavity. No evidence of pneumothorax, hemothorax or pleural effusion.  IMAGE DOCUMENTATION: Images were archived to PACs system.           Critical Care time:  none               Results for orders placed or performed during the hospital encounter of 09/23/23 (from the past 24 hour(s))   POC US CHEST B-SCAN    Impression    [facility] Procedure Note      Limited Bedside ED Cardiac Ultrasound:    PROCEDURE: PERFORMED BY: Dr. Filiberto Howard MD  INDICATIONS/SYMPTOM:  Shortness of Breath  PROBE: Cardiac phased array probe and High frequency linear probe  BODY LOCATION: Chest  FINDINGS:   The ultrasound was performed utilizing the subcostal, parasternal long axis, parasternal short axis, and apical 4 chamber views.  Cardiac contractility:  Present  Gross estimation of cardiac kinesis: normal  Pericardial Effusion:  None  RV:LV ratio: LV >> RV  IVC:    Diameter:  IVC diameter expiration (IVCe) 3+ cm                                                   IVC diameter inspiration (IVCi) 3+ cm                                                       Collapsibility:  IVC collapses < 50% with inspiration  INTERPRETATION:    Chamber size and motion were grossly normal with LV > RV, normal cardiac kinesis.  No pericardial effusion was found.  IVC visualized and findings indicate hypervolemia.  IMAGE DOCUMENTATION: Images were archived to PACs system.        [facility] Procedure Note      Limited Bedside ED Ultrasound of Thorax:    PROCEDURE: PERFORMED BY: Dr. Filiberto Howard MD  INDICATIONS/SYMPTOM:  shortness of breath  PROBE: High frequency linear probe  BODY LOCATION: Chest  FINDINGS:  Images of both lung  hemithoracies taken in 2D in multiple rib spaces        Right side:  Lung sliding artifact  Present     Comet tail artifacts  Absent   Left side:  Lung sliding artifact  Present     Comet tail artifacts  Present   Hemothorax: Right side Absent     Left side Absent   Pleural effusion: Right side Absent      Left side Absent    INTERPRETATION: The exam was normal. There was no free fluid identified in the chest cavity. No evidence of pneumothorax, hemothorax or pleural effusion.  IMAGE DOCUMENTATION: Images were archived to PACs system.         CBC with platelets differential    Narrative    The following orders were created for panel order CBC with platelets differential.  Procedure                               Abnormality         Status                     ---------                               -----------         ------                     CBC with platelets and d...[[id number]]  Abnormal            Final result                 Please view results for these tests on the individual orders.   Troponin T, High Sensitivity   Result Value Ref Range    Troponin T, High Sensitivity 36 (H) <=22 ng/L   CBC with platelets and differential   Result Value Ref Range    WBC Count 9.6 4.0 - 11.0 10e3/uL    RBC Count 4.66 4.40 - 5.90 10e6/uL    Hemoglobin 14.9 13.3 - 17.7 g/dL    Hematocrit 45.6 40.0 - 53.0 %    MCV 98 78 - 100 fL    MCH 32.0 26.5 - 33.0 pg    MCHC 32.7 31.5 - 36.5 g/dL    RDW 13.6 10.0 - 15.0 %    Platelet Count 197 150 - 450 10e3/uL    % Neutrophils 69 %    % Lymphocytes 15 %    % Monocytes 14 %    % Eosinophils 1 %    % Basophils 1 %    % Immature Granulocytes 0 %    NRBCs per 100 WBC 0 <1 /100    Absolute Neutrophils 6.7 1.6 - 8.3 10e3/uL    Absolute Lymphocytes 1.4 0.8 - 5.3 10e3/uL    Absolute Monocytes 1.4 (H) 0.0 - 1.3 10e3/uL    Absolute Eosinophils 0.1 0.0 - 0.7 10e3/uL    Absolute Basophils 0.1 0.0 - 0.2 10e3/uL    Absolute Immature Granulocytes 0.0 <=0.4 10e3/uL    Absolute NRBCs 0.0 10e3/uL        Medications   furosemide (LASIX) injection 60 mg (60 mg Intravenous $Given 9/23/23 0665)       Assessments & Plan (with Medical Decision Making)     MDM: Dragan Prasad is a 87 year old male who is here with orthopnea over the last several nights.  Unaware of any weight gain.  No leg edema.  He had at times chest tightness while he was lying supine but improved with upright status at night.  Some dyspnea on exertion.  No chest pain on exertion.  Also atrial fibrillation -chronic and appears to be rate controlled today but at times rates will go into the 70-90 range.  On aspirin but [id number] is 4.  We discussed the risk of stroke and is interested in using anticoagulation but also has a history of coronary artery bypass couple years ago and should be at least on baby aspirin with this.  There is a risk of peptic ulcer disease with bleeding.  He has no obvious fall risk.  Diuresed here with Lasix IV.  Continues to have adequate oxygen saturation.  Wishes to go home when offered to hospitalize for initial observation of his respiratory status on diuresis.  We will have him watch his daily weights add Lasix and K-Dur.  He again is not yet on metoprolol would start 25 XL to maintain rate control.  I also given this changes for congestive heart failure would recommend that he start very low-dose lisinopril with this 2.5 mg.  Also ordered echocardiogram follow-up primary care and follow-up cardiology.      Patient is pending repeat troponin and will be discharged if within range of serial value    I have given the patient precautions for return.    I have reviewed the nursing notes.    I have reviewed the findings, diagnosis, plan and need for follow up with the patient.     Wt Readings from Last 5 Encounters:   09/23/23 97.1 kg (214 lb)   09/23/23 97.6 kg (215 lb 3.2 oz)   08/03/23 96.6 kg (213 lb)   06/03/22 98.6 kg (217 lb 6.4 oz)   05/21/22 90.7 kg (200 lb)           Medical Decision Making  The patient's  presentation was of high complexity (a chronic illness severe exacerbation, progression, or side effect of treatment).    The patient's evaluation involved:  review of external note(s) from 1 sources (last cardiology note)  review of 3+ test result(s) ordered prior to this encounter (prior troponin, other labs)  ordering and/or review of 3+ test(s) in this encounter (see separate area of note for details)    The patient's management necessitated moderate risk (prescription drug management including medications given in the ED) and high risk (a decision regarding hospitalization).        New Prescriptions    No medications on file       Final diagnoses:   Congestive heart failure, unspecified HF chronicity, unspecified heart failure type (H) - you recieved lasix here.  take lasix twice daily.  take potassium twice daily while on lasix. get echo and cardiology follow-up - consults written., follow daily weights at home.  take low dose lasix daily for heart failure.     Longstanding persistent atrial fibrillation (H) - start apixaban and stay on daily aspirin 81 mg orally daily given the history of bypass surgery 2 years ago. follow-up clinic.     Troponin level elevated - elevated here today - rechecked prior to discharge       9/23/2023   [facility] EMERGENCY DEPT       Filiberto Howard MD  09/23/23 1523       Filiberto Howard MD  09/23/23 1527

## 2023-09-23 NOTE — ED TRIAGE NOTES
Pt here with increased SOB with exertion since receiving the flu shot on Wed. Sent from [facility].     Triage Assessment       Row Name 09/23/23 1129       Triage Assessment (Adult)    Airway WDL WDL       Respiratory WDL    Respiratory WDL X;rhythm/pattern    Rhythm/Pattern, Respiratory dyspnea on exertion       Skin Circulation/Temperature WDL    Skin Circulation/Temperature WDL WDL       Cardiac WDL    Cardiac WDL WDL       Peripheral/Neurovascular WDL    Peripheral Neurovascular WDL WDL       Cognitive/Neuro/Behavioral WDL    Cognitive/Neuro/Behavioral WDL WDL

## 2023-09-23 NOTE — PROGRESS NOTES
Assessment & Plan     Chest pressure  SOB  87-year-old male presented with central chest pressure, shortness of breath, orthopnea which he has been experiencing for last 4 days or so.  Past medical history significant for CABG, abdominal aortic aneurysm, atrial fibrillation, hyperlipidemia, hypertension.  [id number] score 4, on aspirin 81 mg.  ECG findings consistent with atrial fibrillation, chest x-ray showed no acute abnormality, awaiting formal report.  Differential discussed in detail including but not limited to coronary ischemia, pulmonary embolism and lower respiratory tract infection.  Needs further evaluation to delineate specific diagnosis.  Recommended to go ER for further evaluation management.  Patient understood and in agreement with above plan.  All questions answered.  - EKG 12-lead complete w/read - Clinics  - XR Chest 2 Views; Future        Hilario Jordan MD  [facility] CARE [place]    Ricardo Archibald is a 87 year old, presenting for the following health issues:  Breathing Problem (Trouble breathing, chest hurts x 4 days, gets worse laying down.)    HPI   Concern -   Onset: 4 days   Description: chest pressure, sob, worse on lying down  Intensity: moderate  Progression of Symptoms:  worsening  Accompanying Signs & Symptoms: No fever, chills, cough, palpitation, leg swelling, wheezing or other relevant systemic symptoms  Previous history of similar problem: IHD, CABG 2  years ago,AAA  Therapies tried and outcome: ibuprofen, tylenol       Review of Systems   Constitutional, HEENT, cardiovascular, pulmonary, GI, , musculoskeletal, neuro, skin, endocrine and psych systems are negative, except as otherwise noted.      Objective    BP (!) 167/95   Pulse 71   Temp 97.6  F (36.4  C) (Tympanic)   Resp 18   Wt 97.6 kg (215 lb 3.2 oz)   SpO2 96%   BMI 30.01 kg/m    Body mass index is 30.01 kg/m .  Physical Exam   GENERAL: alert and no distress  EYES: Eyes grossly normal  to inspection, PERRL and conjunctivae and sclerae normal  HENT: normal cephalic/atraumatic, nose and mouth without ulcers or lesions, oropharynx clear, and oral mucous membranes moist  NECK: no adenopathy, no asymmetry, masses, or scars and thyroid normal to palpation  RESP: lungs clear to auscultation - no rales, rhonchi or wheezes  CV: irregularly irregular rhythm, normal S1 S2, no S3 or S4, and no murmur, click or rub  ABDOMEN: soft, nontender  MS: no gross musculoskeletal defects noted, no edema  SKIN: no suspicious lesions or rashes  NEURO: Normal strength and tone, mentation intact and speech normal  PSYCH: mentation appears normal, affect normal/bright

## 2023-09-23 NOTE — DISCHARGE INSTRUCTIONS
ICD-10-CM    1. Congestive heart failure, unspecified HF chronicity, unspecified heart failure type (H)  I50.9 Echocardiogram Complete     Adult Cardiology Eval  Referral    you recieved lasix here.  take lasix twice daily.  take potassium twice daily while on lasix. get echo and cardiology follow-up - consults written., follow daily weights at home.  take low dose lasix daily for heart failure.        2. Longstanding persistent atrial fibrillation (H)  I48.11 Echocardiogram Complete     Adult Cardiology Eval  Referral    start apixaban and stay on daily aspirin 81 mg orally daily given the history of bypass surgery 2 years ago. follow-up clinic.        3. Troponin level elevated  R77.8     elevated here today - rechecked prior to discharge

## 2023-09-23 NOTE — ED NOTES
"Pt states that symptoms resolve with tylenol \"until the tylenol wears off\". Pt reports increased shortness of breath when laying down.   Pt just had flu shot Wednesday, symptoms started after that.   "

## 2025-08-07 ENCOUNTER — OFFICE VISIT (OUTPATIENT)
Dept: FAMILY MEDICINE | Facility: CLINIC | Age: 89
End: 2025-08-07
Payer: MEDICARE

## 2025-08-07 ENCOUNTER — TELEPHONE (OUTPATIENT)
Dept: FAMILY MEDICINE | Facility: CLINIC | Age: 89
End: 2025-08-07

## 2025-08-07 VITALS
BODY MASS INDEX: 30.18 KG/M2 | HEIGHT: 70 IN | WEIGHT: 210.8 LBS | OXYGEN SATURATION: 97 % | HEART RATE: 79 BPM | DIASTOLIC BLOOD PRESSURE: 82 MMHG | SYSTOLIC BLOOD PRESSURE: 128 MMHG | RESPIRATION RATE: 20 BRPM | TEMPERATURE: 97.1 F

## 2025-08-07 DIAGNOSIS — M65.30 TRIGGER FINGER, ACQUIRED: Primary | ICD-10-CM

## 2025-08-07 RX ORDER — TRIAMCINOLONE ACETONIDE 40 MG/ML
10 INJECTION, SUSPENSION INTRA-ARTICULAR; INTRAMUSCULAR ONCE
Status: COMPLETED | OUTPATIENT
Start: 2025-08-07 | End: 2025-08-07

## 2025-08-07 RX ORDER — LIDOCAINE HYDROCHLORIDE 10 MG/ML
0.5 INJECTION, SOLUTION EPIDURAL; INFILTRATION; INTRACAUDAL; PERINEURAL ONCE
Status: DISCONTINUED | OUTPATIENT
Start: 2025-08-07 | End: 2025-08-07

## 2025-08-07 RX ORDER — LIDOCAINE HYDROCHLORIDE 10 MG/ML
0.5 INJECTION, SOLUTION INFILTRATION; PERINEURAL ONCE
Status: COMPLETED | OUTPATIENT
Start: 2025-08-07 | End: 2025-08-07

## 2025-08-07 RX ADMIN — TRIAMCINOLONE ACETONIDE 10 MG: 40 INJECTION, SUSPENSION INTRA-ARTICULAR; INTRAMUSCULAR at 15:53

## 2025-08-07 RX ADMIN — LIDOCAINE HYDROCHLORIDE 0.5 ML: 10 INJECTION, SOLUTION INFILTRATION; PERINEURAL at 16:09

## 2025-08-07 ASSESSMENT — PAIN SCALES - GENERAL: PAINLEVEL_OUTOF10: SEVERE PAIN (8)

## (undated) DEVICE — ESU ELEC BLADE 6" COATED/INSULATED E1455-6

## (undated) DEVICE — SU PROLENE 4-0 SHDA 36" 8521H

## (undated) DEVICE — BONE WAX 2.5GM W31G

## (undated) DEVICE — LEAD PACER MYOCARDIAL BIPOLAR TEMPORARY 53CM 6495F

## (undated) DEVICE — DRAPE IOBAN INCISE 36X23" 6651EZ

## (undated) DEVICE — DECANTER BAG 2002S

## (undated) DEVICE — SYR BULB IRRIG 50ML LATEX FREE 0035280

## (undated) DEVICE — SURGICEL POWDER ABSORBABLE HEMOSTAT 3GM 3013SP

## (undated) DEVICE — SU VICRYL 2-0 CT-1 27" UND J259H

## (undated) DEVICE — LINEN LEG ROLL 5489

## (undated) DEVICE — COVER TABLE POLY 65X90" 8186

## (undated) DEVICE — DRAPE POUCH INSTRUMENT 1018

## (undated) DEVICE — TOURNIQUET VASCULAR

## (undated) DEVICE — BLADE KNIFE SURG 11 371111

## (undated) DEVICE — MANIFOLD KIT ANGIO AUTOMATED 014613

## (undated) DEVICE — KIT ENDO VASOVIEW HEMOPRO 2 VH-4000

## (undated) DEVICE — RX SURGIFLO HEMOSTATIC MATRIX W/THROMBIN 8ML 2994

## (undated) DEVICE — SYR 50ML LL W/O NDL 309653

## (undated) DEVICE — DRSG TELFA ISLAND 4X14" 7544

## (undated) DEVICE — CANNULA PERFUSION AORTIC ROOT 22FR 20012

## (undated) DEVICE — PACK MINI VAC CUSTOM CARDOPULMONARY BB5Z97R15

## (undated) DEVICE — CLIP SPRING FOGARTY SOFTJAW CSOFT6

## (undated) DEVICE — SOL NACL 0.9% IRRIG 1000ML BOTTLE 2F7124

## (undated) DEVICE — CABLE MYO/LEAD PACING WHITE DISP 019-530

## (undated) DEVICE — TOTE ANGIO CORP PC15AT SAN32CC83O

## (undated) DEVICE — PACK TUBING MINI VAC CUSTOM 1/2X3/8T BB9J78R4

## (undated) DEVICE — CLIP HORIZON MULTI SM YELLOW 001204

## (undated) DEVICE — SU VICRYL 0 CTX 36" J370H

## (undated) DEVICE — SU PROLENE 6-0 C-1DA 30" M8706

## (undated) DEVICE — DEFIB PRO-PADZ LVP LQD GEL ADULT 8900-2105-01

## (undated) DEVICE — SU PROLENE 7-0 BV175-6 24' M8737

## (undated) DEVICE — CLIP HORIZON MULTI MED BLUE 002204

## (undated) DEVICE — SUCTION CANISTER MEDIVAC LINER 3000ML W/LID 65651-530

## (undated) DEVICE — NDL 19GA 1.5" FILTER 305200

## (undated) DEVICE — DRAIN CHEST TUBE 32FR STR 8032

## (undated) DEVICE — Device

## (undated) DEVICE — SU MONOCRYL 4-0 PS-2 18" UND Y496G

## (undated) DEVICE — SOL RINGERS LACTATED 1000ML BAG 2B2324X

## (undated) DEVICE — CANNULA CORONARY IMA 31001

## (undated) DEVICE — CATH ANGIO JUDKINS R4 6FRX100CM INFINITI 534621T

## (undated) DEVICE — KIT WASH CELL SAVING ATL2001

## (undated) DEVICE — DRAIN CHEST TUBE RIGHT ANGLED 28FR 8128

## (undated) DEVICE — GLOVE DERMASSURE GREEN PF 7.5 LATEX FREE MSG6575

## (undated) DEVICE — PROTECTOR ARM ONE-STEP TRENDELENBURG 40418

## (undated) DEVICE — LINEN TOWEL PACK X30 5481

## (undated) DEVICE — SUCTION CATH AIRLIFE TRI-FLO W/CONTROL PORT 14FR  T60C

## (undated) DEVICE — DRSG GAUZE 4X4" 3033

## (undated) DEVICE — BLADE KNIFE BEAVER 6900

## (undated) DEVICE — NDL COUNTER 20CT 31142493

## (undated) DEVICE — PREP CHLORAPREP W/ORANGE TINT 10.5ML 930715

## (undated) DEVICE — LINEN TOWEL PACK X6 WHITE 5487

## (undated) DEVICE — CONNECTOR DRAIN CHEST Y EXTENSION SET 19909

## (undated) DEVICE — LIGHT HANDLE X2

## (undated) DEVICE — CATH ANGIO JUDKINS JL4 6FRX100CM INFINITI 534620T

## (undated) DEVICE — WIRE GUIDE 0.035"X260CM SAFE-T-J EXCHANGE G00517

## (undated) DEVICE — KIT HAND CONTROL ANGIOTOUCH ACIST 65CM AT-P65

## (undated) DEVICE — SLEEVE TR BAND RADIAL COMPRESSION DEVICE 24CM TRB24-REG

## (undated) DEVICE — PUNCH AORTIC 4.0MMX8" RCB40

## (undated) DEVICE — SYR 30ML LL W/O NDL 302832

## (undated) DEVICE — NDL 18GA 1.5" 305196

## (undated) DEVICE — SOMASENSOR CEREBRAL OXIMETER ADULT SAFB-SM

## (undated) DEVICE — SU ETHIBOND 3-0 BBDA 36" X588H

## (undated) DEVICE — ANTIFOG SOLUTION W/FOAM PAD 31142527

## (undated) DEVICE — CONNECTOR PREFUSION REDUCER 3/8X1/2" W/O LL 6013

## (undated) DEVICE — SOL WATER IRRIG 1000ML BOTTLE 2F7114

## (undated) DEVICE — INSERT FOGARTY 86MM TRACTION DBL SAFEJAW DSAFE86

## (undated) DEVICE — CANNULA PERFUSION ARTERIAL 20FR 12" 77420

## (undated) DEVICE — POSITIONER ASSIST ESSTECH 3S T401210S

## (undated) DEVICE — SYR 10ML LL W/O NDL 302995

## (undated) DEVICE — PACK OPEN HEART PV12OH524

## (undated) DEVICE — TUBING PRESSURE 72"

## (undated) DEVICE — SPONGE RAY-TEC 4X8" 7318

## (undated) DEVICE — COVER SHOE STERILE

## (undated) DEVICE — SU STEEL 6 CCS 4X18" M654G

## (undated) DEVICE — SU PLEDGET SOFT TFE 3/8"X3/26"X1/16" PCP40

## (undated) DEVICE — DRAPE CONVERTORS U-DRAPE 60X72" 8476

## (undated) DEVICE — SURGICEL HEMOSTAT 2X14" 1951

## (undated) DEVICE — LINEN TOWEL PACK X5 5464

## (undated) DEVICE — ADPT PERFUSION MULTIPLE

## (undated) DEVICE — NDL COUNTER 40CT  31142311

## (undated) DEVICE — DRAPE CV SPLIT 110X36" 89452

## (undated) DEVICE — INTRO GLIDESHEATH SLENDER 6FR 10X45CM 60-1060

## (undated) DEVICE — RESERVOIR CELL SAVING BLOOD COLLECTION EL2120

## (undated) DEVICE — DEVICE ASSEMBLY SUCTION/ANTI COAG BTC93

## (undated) DEVICE — BLOWER/MISTER CLEARVIEW 22150

## (undated) DEVICE — SYR ANGIOGRAPHY MULTIUSE KIT ACIST 014612

## (undated) DEVICE — NDL 22GA 1.5"

## (undated) DEVICE — DRSG KERLIX 4 1/2"X4YDS ROLL 6715

## (undated) RX ORDER — FENTANYL CITRATE 50 UG/ML
INJECTION, SOLUTION INTRAMUSCULAR; INTRAVENOUS
Status: DISPENSED
Start: 2021-09-10

## (undated) RX ORDER — DOPAMINE HYDROCHLORIDE 160 MG/100ML
INJECTION, SOLUTION INTRAVENOUS
Status: DISPENSED
Start: 2021-09-07

## (undated) RX ORDER — FENTANYL CITRATE 50 UG/ML
INJECTION, SOLUTION INTRAMUSCULAR; INTRAVENOUS
Status: DISPENSED
Start: 2021-09-07

## (undated) RX ORDER — HEPARIN SODIUM 1000 [USP'U]/ML
INJECTION, SOLUTION INTRAVENOUS; SUBCUTANEOUS
Status: DISPENSED
Start: 2021-09-10

## (undated) RX ORDER — CEFAZOLIN SODIUM 1 G/3ML
INJECTION, POWDER, FOR SOLUTION INTRAMUSCULAR; INTRAVENOUS
Status: DISPENSED
Start: 2021-09-10

## (undated) RX ORDER — REGADENOSON 0.08 MG/ML
INJECTION, SOLUTION INTRAVENOUS
Status: DISPENSED
Start: 2021-06-30

## (undated) RX ORDER — CEFAZOLIN SODIUM 2 G/100ML
INJECTION, SOLUTION INTRAVENOUS
Status: DISPENSED
Start: 2021-09-10

## (undated) RX ORDER — VANCOMYCIN HYDROCHLORIDE 500 MG/10ML
INJECTION, POWDER, LYOPHILIZED, FOR SOLUTION INTRAVENOUS
Status: DISPENSED
Start: 2021-09-10

## (undated) RX ORDER — PROTAMINE SULFATE 10 MG/ML
INJECTION, SOLUTION INTRAVENOUS
Status: DISPENSED
Start: 2021-09-10

## (undated) RX ORDER — FENTANYL CITRATE 0.05 MG/ML
INJECTION, SOLUTION INTRAMUSCULAR; INTRAVENOUS
Status: DISPENSED
Start: 2021-09-10

## (undated) RX ORDER — VERAPAMIL HYDROCHLORIDE 2.5 MG/ML
INJECTION, SOLUTION INTRAVENOUS
Status: DISPENSED
Start: 2021-09-07

## (undated) RX ORDER — VECURONIUM BROMIDE 1 MG/ML
INJECTION, POWDER, LYOPHILIZED, FOR SOLUTION INTRAVENOUS
Status: DISPENSED
Start: 2021-09-10

## (undated) RX ORDER — PAPAVERINE HYDROCHLORIDE 30 MG/ML
INJECTION INTRAMUSCULAR; INTRAVENOUS
Status: DISPENSED
Start: 2021-09-10

## (undated) RX ORDER — LIDOCAINE HYDROCHLORIDE 10 MG/ML
INJECTION, SOLUTION EPIDURAL; INFILTRATION; INTRACAUDAL; PERINEURAL
Status: DISPENSED
Start: 2021-09-07

## (undated) RX ORDER — NEOSTIGMINE METHYLSULFATE 1 MG/ML
VIAL (ML) INJECTION
Status: DISPENSED
Start: 2021-09-10

## (undated) RX ORDER — PROPOFOL 10 MG/ML
INJECTION, EMULSION INTRAVENOUS
Status: DISPENSED
Start: 2021-09-10

## (undated) RX ORDER — LIDOCAINE HYDROCHLORIDE 20 MG/ML
INJECTION, SOLUTION EPIDURAL; INFILTRATION; INTRACAUDAL; PERINEURAL
Status: DISPENSED
Start: 2021-09-10

## (undated) RX ORDER — HEPARIN SODIUM 1000 [USP'U]/ML
INJECTION, SOLUTION INTRAVENOUS; SUBCUTANEOUS
Status: DISPENSED
Start: 2021-09-07

## (undated) RX ORDER — PHENYLEPHRINE HYDROCHLORIDE 10 MG/ML
INJECTION INTRAVENOUS
Status: DISPENSED
Start: 2021-09-07